# Patient Record
Sex: MALE | Race: WHITE | NOT HISPANIC OR LATINO | Employment: PART TIME | URBAN - METROPOLITAN AREA
[De-identification: names, ages, dates, MRNs, and addresses within clinical notes are randomized per-mention and may not be internally consistent; named-entity substitution may affect disease eponyms.]

---

## 2017-01-04 DIAGNOSIS — M79.641 RIGHT HAND PAIN: Primary | ICD-10-CM

## 2017-01-05 ENCOUNTER — HOSPITAL ENCOUNTER (OUTPATIENT)
Dept: RADIOLOGY | Facility: HOSPITAL | Age: 67
Discharge: HOME | End: 2017-01-05
Attending: ORTHOPAEDIC SURGERY
Payer: MEDICARE

## 2017-01-05 ENCOUNTER — OFFICE VISIT (OUTPATIENT)
Dept: ORTHOPEDICS | Facility: CLINIC | Age: 67
End: 2017-01-05
Payer: MEDICARE

## 2017-01-05 ENCOUNTER — TELEPHONE (OUTPATIENT)
Dept: PHYSICAL MEDICINE AND REHAB | Facility: CLINIC | Age: 67
End: 2017-01-05

## 2017-01-05 VITALS
HEART RATE: 72 BPM | WEIGHT: 170 LBS | SYSTOLIC BLOOD PRESSURE: 136 MMHG | BODY MASS INDEX: 27.32 KG/M2 | HEIGHT: 66 IN | DIASTOLIC BLOOD PRESSURE: 71 MMHG

## 2017-01-05 DIAGNOSIS — M79.601 RIGHT ARM PAIN: Primary | ICD-10-CM

## 2017-01-05 DIAGNOSIS — R20.0 NUMBNESS OF RIGHT HAND: ICD-10-CM

## 2017-01-05 DIAGNOSIS — M79.641 RIGHT HAND PAIN: ICD-10-CM

## 2017-01-05 PROCEDURE — 73130 X-RAY EXAM OF HAND: CPT | Mod: 26,RT,, | Performed by: RADIOLOGY

## 2017-01-05 PROCEDURE — 1126F AMNT PAIN NOTED NONE PRSNT: CPT | Mod: S$GLB,,, | Performed by: ORTHOPAEDIC SURGERY

## 2017-01-05 PROCEDURE — 1159F MED LIST DOCD IN RCRD: CPT | Mod: S$GLB,,, | Performed by: ORTHOPAEDIC SURGERY

## 2017-01-05 PROCEDURE — 73130 X-RAY EXAM OF HAND: CPT | Mod: TC,PN,RT

## 2017-01-05 PROCEDURE — 1160F RVW MEDS BY RX/DR IN RCRD: CPT | Mod: S$GLB,,, | Performed by: ORTHOPAEDIC SURGERY

## 2017-01-05 PROCEDURE — 99214 OFFICE O/P EST MOD 30 MIN: CPT | Mod: S$GLB,,, | Performed by: ORTHOPAEDIC SURGERY

## 2017-01-05 PROCEDURE — 1157F ADVNC CARE PLAN IN RCRD: CPT | Mod: S$GLB,,, | Performed by: ORTHOPAEDIC SURGERY

## 2017-01-05 PROCEDURE — 3078F DIAST BP <80 MM HG: CPT | Mod: S$GLB,,, | Performed by: ORTHOPAEDIC SURGERY

## 2017-01-05 PROCEDURE — 3075F SYST BP GE 130 - 139MM HG: CPT | Mod: S$GLB,,, | Performed by: ORTHOPAEDIC SURGERY

## 2017-01-05 PROCEDURE — 99999 PR PBB SHADOW E&M-EST. PATIENT-LVL III: CPT | Mod: PBBFAC,,, | Performed by: ORTHOPAEDIC SURGERY

## 2017-01-05 NOTE — TELEPHONE ENCOUNTER
----- Message from Naila Fuller LPN sent at 1/5/2017  2:51 PM CST -----  Good Afternoon,   Please call patient to schedule a follow up appt with Dr. Liang for review of his MRI of cervical spine. Mri is scheduled for 1/11/17. Dr. Orlando would like patient to be seen as soon as possible after MRI. Thanks.
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
## 2017-01-11 ENCOUNTER — HOSPITAL ENCOUNTER (OUTPATIENT)
Dept: RADIOLOGY | Facility: HOSPITAL | Age: 67
Discharge: HOME | End: 2017-01-11
Attending: ORTHOPAEDIC SURGERY
Payer: MEDICARE

## 2017-01-11 DIAGNOSIS — M79.601 RIGHT ARM PAIN: ICD-10-CM

## 2017-01-11 PROCEDURE — 72141 MRI NECK SPINE W/O DYE: CPT | Mod: TC

## 2017-01-11 PROCEDURE — 72141 MRI NECK SPINE W/O DYE: CPT | Mod: 26,,, | Performed by: RADIOLOGY

## 2017-01-18 ENCOUNTER — OFFICE VISIT (OUTPATIENT)
Dept: PHYSICAL MEDICINE AND REHAB | Facility: CLINIC | Age: 67
End: 2017-01-18
Payer: MEDICARE

## 2017-01-18 ENCOUNTER — OFFICE VISIT (OUTPATIENT)
Dept: PAIN MEDICINE | Facility: CLINIC | Age: 67
End: 2017-01-18
Payer: MEDICARE

## 2017-01-18 VITALS
BODY MASS INDEX: 27.14 KG/M2 | HEIGHT: 66 IN | HEART RATE: 57 BPM | WEIGHT: 168.88 LBS | SYSTOLIC BLOOD PRESSURE: 177 MMHG | DIASTOLIC BLOOD PRESSURE: 81 MMHG

## 2017-01-18 VITALS
SYSTOLIC BLOOD PRESSURE: 174 MMHG | HEART RATE: 59 BPM | BODY MASS INDEX: 27 KG/M2 | HEIGHT: 66 IN | WEIGHT: 168 LBS | DIASTOLIC BLOOD PRESSURE: 81 MMHG

## 2017-01-18 DIAGNOSIS — M50.30 DDD (DEGENERATIVE DISC DISEASE), CERVICAL: Primary | ICD-10-CM

## 2017-01-18 DIAGNOSIS — M54.12 CERVICAL RADICULAR PAIN: Primary | ICD-10-CM

## 2017-01-18 DIAGNOSIS — R29.898 RIGHT HAND WEAKNESS: ICD-10-CM

## 2017-01-18 PROCEDURE — 1157F ADVNC CARE PLAN IN RCRD: CPT | Mod: S$GLB,,, | Performed by: PHYSICAL MEDICINE & REHABILITATION

## 2017-01-18 PROCEDURE — 99999 PR PBB SHADOW E&M-EST. PATIENT-LVL III: CPT | Mod: PBBFAC,,, | Performed by: PHYSICAL MEDICINE & REHABILITATION

## 2017-01-18 PROCEDURE — 3079F DIAST BP 80-89 MM HG: CPT | Mod: S$GLB,,, | Performed by: PHYSICAL MEDICINE & REHABILITATION

## 2017-01-18 PROCEDURE — 3077F SYST BP >= 140 MM HG: CPT | Mod: S$GLB,,, | Performed by: PHYSICAL MEDICINE & REHABILITATION

## 2017-01-18 PROCEDURE — 1160F RVW MEDS BY RX/DR IN RCRD: CPT | Mod: S$GLB,,, | Performed by: PHYSICAL MEDICINE & REHABILITATION

## 2017-01-18 PROCEDURE — 3079F DIAST BP 80-89 MM HG: CPT | Mod: S$GLB,,, | Performed by: ANESTHESIOLOGY

## 2017-01-18 PROCEDURE — 1125F AMNT PAIN NOTED PAIN PRSNT: CPT | Mod: S$GLB,,, | Performed by: ANESTHESIOLOGY

## 2017-01-18 PROCEDURE — 1159F MED LIST DOCD IN RCRD: CPT | Mod: S$GLB,,, | Performed by: ANESTHESIOLOGY

## 2017-01-18 PROCEDURE — 99204 OFFICE O/P NEW MOD 45 MIN: CPT | Mod: S$GLB,,, | Performed by: ANESTHESIOLOGY

## 2017-01-18 PROCEDURE — 3077F SYST BP >= 140 MM HG: CPT | Mod: S$GLB,,, | Performed by: ANESTHESIOLOGY

## 2017-01-18 PROCEDURE — 99999 PR PBB SHADOW E&M-EST. PATIENT-LVL III: CPT | Mod: PBBFAC,,, | Performed by: ANESTHESIOLOGY

## 2017-01-18 PROCEDURE — 99214 OFFICE O/P EST MOD 30 MIN: CPT | Mod: S$GLB,,, | Performed by: PHYSICAL MEDICINE & REHABILITATION

## 2017-01-18 PROCEDURE — 1160F RVW MEDS BY RX/DR IN RCRD: CPT | Mod: S$GLB,,, | Performed by: ANESTHESIOLOGY

## 2017-01-18 PROCEDURE — 1157F ADVNC CARE PLAN IN RCRD: CPT | Mod: S$GLB,,, | Performed by: ANESTHESIOLOGY

## 2017-01-18 PROCEDURE — 1159F MED LIST DOCD IN RCRD: CPT | Mod: S$GLB,,, | Performed by: PHYSICAL MEDICINE & REHABILITATION

## 2017-01-18 PROCEDURE — 1125F AMNT PAIN NOTED PAIN PRSNT: CPT | Mod: S$GLB,,, | Performed by: PHYSICAL MEDICINE & REHABILITATION

## 2017-01-18 NOTE — PROGRESS NOTES
HPI:  Patient is a 66 y.o. year old male s/p right carpal tunnel release 5 mos ago. However, he has pain in his entire arm and his digits 4 and 5 are triggering.  ordered mri of cervical spine see below. Occasionally he has straighten out digit 5 and 4.   IMAGING  MRI CERVICAL SPINE WITHOUT CONTRAST    COMPARISON:  None    TECHNIQUE:  Sagittal T1, T2, and STIR;  axial T2 and 3D GRE sequences through the cervical spine were acquired without contrast.      FINDINGS:    There is a mild bursal of the usual cervical lordosis in the mid cervical spine and there is very slight posterior positioning of C5 relative to C6 by 1-2 mm most likely on a degenerative basis.  There is no abnormal marrow signal suggesting acute fracture or osseous destruction    C2-C3: No disc protrusion or spinal canal or neural foramen narrowing    C3-C4: Uncovertebral spurring, mild broad posterior disc bulge, just mild impression on the thecal sac, moderate bilateral neural foramen narrowing    C4-C5: Posterior midline disc protrusion with near complete if not complete effacement of the thecal sac. Mild uncovertebral spurring.  Mild bilateral neural foramen narrowing    C5-C6: Uncovertebral spurring, mild retrolisthesis, broad posterior disc bulge, moderate bilateral neural foramen narrowing, mild impression of the thecal sac    C6-C7: Facet arthropathy, uncovertebral spurring, posterior left paracentral disc protrusion in contact with the anterior margin of the spinal cord without appreciable deformity of the spinal cord or abnormal signal in the spinal cord.  Moderate severe left, moderate right neural foramen narrowing    C7-T1: No spinal canal or neural foramen narrowing    The cervical spinal cord is intrinsically normal in appearance and craniocervical junction is normal in appearance   Impression       C6-C7 there is mild spinal canal narrowing with facet arthropathy, posterior osteophytes and disc protrusion with the posterior  margin of the disc in contact with the anterior margin of the spinal cord.    To a lesser degree there is mild spinal canal narrowing C4-C5, C3-C4, C5-C6.  Multilevel neural foramen narrowing as described               3 views of the right hand are provided.  An acute fracture is not identified.  There is narrowing of the radiocarpal joint and slight irregularity at the first carpal metacarpal joint consistent with DJD.  There also degenerative changes noted at the DIP joint of the index finger with lesser degenerative changes noted of the fifth finger PIP joint and DIP joint and the middle finger DIP joint.  Juxta-articular bone erosion is not seen.   Impression    DJD changes at the wrist and fingers as described           Past Medical History   Diagnosis Date    Coronary artery disease 2002     stent    Hypertension     Kidney stones     MI (myocardial infarction)      Past Surgical History   Procedure Laterality Date    Coronary stent placement      Tonsillectomy      Cardiac surgery      Lithotripsy       Family History   Problem Relation Age of Onset    Diabetes Mother     Stroke Father     Cystic fibrosis Son      Social History     Social History    Marital status:      Spouse name: N/A    Number of children: N/A    Years of education: N/A     Social History Main Topics    Smoking status: Former Smoker     Packs/day: 1.00     Years: 3.00     Start date: 1/1/1967    Smokeless tobacco: Never Used    Alcohol use No      Comment: OCC    Drug use: No    Sexual activity: Not on file     Other Topics Concern    Not on file     Social History Narrative       Review of patient's allergies indicates:  No Known Allergies    Current Outpatient Prescriptions:     amlodipine (NORVASC) 10 MG tablet, Take 1 tablet (10 mg total) by mouth once daily. (Patient taking differently: Take 10 mg by mouth once daily. PRN), Disp: 30 tablet, Rfl: 1    hydrocodone-acetaminophen 5-325mg (NORCO) 5-325 mg per  tablet, Take 1 tablet by mouth every 4 (four) hours as needed for Pain., Disp: 41 tablet, Rfl: 0    hydrocodone-acetaminophen 5-325mg (NORCO) 5-325 mg per tablet, Take 1 tablet by mouth every 4 (four) hours as needed for Pain., Disp: 41 tablet, Rfl: 0    ondansetron (ZOFRAN-ODT) 4 MG TbDL, Take 2 tablets (8 mg total) by mouth every 8 (eight) hours as needed., Disp: 20 tablet, Rfl: 0          Review of Systems  No nausea, vomiting, fevers, Chills , contipation, diarrhea or sweats    Physical Exam:      Vitals:    01/18/17 1054   BP: (!) 177/81   Pulse: (!) 57     alert and oriented ×4 follows commands answers all questions appropriately,affect wnl  Manual muscle test 5 out of 5 except for hi left side 4/5 sensation to light touch grossly intact in left arm dec in right arm entire arm  +excruciate tenderness b/l  cervical paraspinals   Antalgic gait  No C/C/E  Painful nodules at base of left digits 4 and 5  Assessment:  Cervical radic w. Mod  neuroforaminal narrowing left side and severe on right side  S/p carpal tunnel surgery w. Improvement   Trigger fingers left digi 4 and 5  Right ulnar neuropathy at elbow in prior emg mild    Plan:  Referral to dr. Johnston pain management for cervical tammy- therapeutic/diagnostic  Repeat the EMG only on right arm  Refer to physical for strengthening of right arm  Refer to nsg, however will wait on emg for any surgery,  I think the weakness in the right hand is secondary to an ulnar nerve entrapment at elbow rather than  Secondary to a true cervical radic despite the mri findings. The tammy of cervical spine should also help w. Diagnosis.   Consider referral to dr. Thurston for ulnar nerve release pending response and results from above

## 2017-01-18 NOTE — PROGRESS NOTES
This note was completed with dictation software and grammatical errors may exist.    Referring Physician: Yoly Liang,*    PCP: Jarett Davenport Iv, MD      CC: right hand weakness    HPI:   Patient is 66-year-old male referred to us for right hand weakness.  Pain has progressed over last 6 months.  No traumatic incident.  His constant aching, numbness in his right ring finger and pinky.  He feels radiating pain up his wrist into his shoulder at times.  Pain worsens with lifting of his right arm and lifting.  He does have some mild posterior neck pain.  He denies any radiating pain from his neck to his hands.  He reports weakness in those fingers.  No incoordination.  No bowel bladder changes.  He is scheduled for EMG study with Dr. Liang in the near future.  MRI of the cervical spine did show some degenerative changes in his neck.  He rates his pain 6/10 today.    ROS:  CONSTITUTIONAL: No fevers, chills, night sweats, wt. loss, appetite changes  SKIN: no rashes or itching  ENT: No headaches, head trauma, vision changes, or eye pain  LYMPH NODES: None noticed   CV: No chest pain, palpitations.   RESP: No shortness of breath, dyspnea on exertion, cough, wheezing, or hemoptysis  GI: No nausea, emesis, diarrhea, constipation, melena, hematochezia, pain.    : No dysuria, hematuria, urgency, or frequency   HEME: No easy bruising, bleeding problems  PSYCHIATRIC: No depression, anxiety, psychosis, hallucinations.  NEURO: No seizures, memory loss, dizziness or difficulty sleeping  MSK: + History of present illness      Past Medical History   Diagnosis Date    Coronary artery disease 2002     stent    Hypertension     Kidney stones     MI (myocardial infarction)      Past Surgical History   Procedure Laterality Date    Coronary stent placement      Tonsillectomy      Cardiac surgery      Lithotripsy       Family History   Problem Relation Age of Onset    Diabetes Mother     Stroke Father     Cystic  "fibrosis Son      Social History     Social History    Marital status:      Spouse name: N/A    Number of children: N/A    Years of education: N/A     Social History Main Topics    Smoking status: Former Smoker     Packs/day: 1.00     Years: 3.00     Start date: 1/1/1967    Smokeless tobacco: Never Used    Alcohol use No      Comment: OCC    Drug use: No    Sexual activity: Not Asked     Other Topics Concern    None     Social History Narrative         Medications/Allergies: See med card    Vitals:    01/18/17 1449   BP: (!) 174/81   Pulse: (!) 59   Weight: 76.2 kg (168 lb)   Height: 5' 6" (1.676 m)   PainSc:   6   PainLoc: Hand         Physical exam:    GENERAL: A and O x3, the patient appears well groomed and is in no acute distress.  Skin: No rashes or obvious lesions  HEENT: normocephalic, atraumatic  CARDIOVASCULAR:  Palpable peripheral pulses  LUNGS: easy work of breathing  ABDOMEN: soft, nontender   UPPER EXTREMITIES: Normal alignment, normal range of motion, no atrophy, no skin changes,  hair growth and nail growth normal and equal bilaterally. No swelling, no tenderness.    LOWER EXTREMITIES:  Normal alignment, normal range of motion, no atrophy, no skin changes,  hair growth and nail growth normal and equal bilaterally. No swelling, no tenderness.  CERVICAL SPINE:  Cervical spine: ROM is full in flexion, extension and lateral rotation without increased pain.  Spurling's maneuver causes no neck pain to either side.  Myofascial exam: No Tenderness to palpation across cervical paraspinous region bilaterally.      MENTAL STATUS: normal orientation, speech, language, and fund of knowledge for social situation.  Emotional state appropriate.    CRANIAL NERVES:  II:  PERRL bilaterally,   III,IV,VI: EOMI.    V:  Facial sensation equal bilaterally  VII:  Facial motor function normal.  VIII:  Hearing equal to finger rub bilaterally  IX/X: Gag normal, palate symmetric  XI:  Shoulder shrug equal, head " turn equal  XII:  Tongue midline without fasciculations      MOTOR: Tone and bulk: normal bilateral upper and lower Strength: normal   Delt Bi Tri WE WF     R 5 5 5 5 5 4   L 5 5 5 5 5 5     IP ADD ABD Quad TA Gas HAM  R 5 5 5 5 5 5 5  L 5 5 5 5 5 5 5    SENSATION: Light touch and pinprick intact bilaterally  REFLEXES: normal, symmetric, nonbrisk.  Toes down, no clonus. No hoffmans.  GAIT: normal rise, base, steps, and arm swing.        Imaging:  MRI C-spine 12/2016  C6-C7 there is mild spinal canal narrowing with facet arthropathy, posterior osteophytes and disc protrusion with the posterior margin of the disc in contact with the anterior margin of the spinal cord.    To a lesser degree there is mild spinal canal narrowing C4-C5, C3-C4, C5-C6.  Multilevel neural foramen narrowing as described    Assessment:  Patient presents with right hand weakness  1. DDD (degenerative disc disease), cervical    2. Right hand weakness          Plan:  - I have stressed the importance of physical activity and exercise to improve overall health  - Continue evaluation with Dr. Valente.  I agree with EMG study as I suspect some degree of ulnar neuropathy  - He does have findings on his cervical MRI.  Agree with scheduling a cervical GABRIELE as both a diagnostic and therapeutic approach to help with his pain  - Follow up after procedure      Thank you for referring this interesting patient, and I look forward to continuing to collaborate in his care.

## 2017-01-18 NOTE — MR AVS SNAPSHOT
[facility]  [address] [place] [address] [place] [address]  Phone: [phone]  Fax: [phone]                  Neptali Gonzalez   2017 11:00 AM   Office Visit    Description:  Male : 1950   Provider:  Yoly Liang DO   Department:  [facility]           Reason for Visit     Shoulder Pain           Diagnoses this Visit        Comments    Cervical radicular pain    -  Primary            To Do List           Future Appointments        Provider Department Dept Phone    2017 2:20 PM Gordon Johnston MD [place] [phone]    2017 1:00 PM Yoly Liang DO [facility] [phone]    2017 1:40 PM Yoly Liang DO [facility] [phone]      Goals (5 Years of Data)     None      Follow-Up and Disposition     Return in about 4 weeks (around 2/15/2017).    Follow-up and Disposition History      [facility] On Call     [facility] On Call Nurse Care Line -  Assistance  Registered nurses in the [facility] On Call Center provide clinical advisement, health education, appointment booking, and other advisory services.  Call for this free service at [phone].             Medications           Message regarding Medications     Verify the changes and/or additions to your medication regime listed below are the same as discussed with your clinician today.  If any of these changes or additions are incorrect, please notify your healthcare provider.             Verify that the below list of medications is an accurate representation of the medications you are currently taking.  If none reported, the list may be blank. If incorrect, please contact your healthcare provider. Carry this list with you in case of emergency.           Current Medications     amlodipine (NORVASC) 10 MG tablet Take 1 tablet (10 mg total) by mouth once daily.    hydrocodone-acetaminophen 5-325mg (NORCO)  "5-325 mg per tablet Take 1 tablet by mouth every 4 (four) hours as needed for Pain.    hydrocodone-acetaminophen 5-325mg (NORCO) 5-325 mg per tablet Take 1 tablet by mouth every 4 (four) hours as needed for Pain.    ondansetron (ZOFRAN-ODT) 4 MG TbDL Take 2 tablets (8 mg total) by mouth every 8 (eight) hours as needed.           Clinical Reference Information           Vital Signs - Last Recorded  Most recent update: 1/18/2017 10:58 AM by Melony Brown MA    BP Pulse Ht Wt BMI    (!) 177/81 (!) 57 5' 6" (1.676 m) 76.6 kg (168 lb 14 oz) 27.26 kg/m2      Blood Pressure          Most Recent Value    BP  (!)  177/81      Allergies as of 1/18/2017     No Known Allergies      Immunizations Administered on Date of Encounter - 1/18/2017     None      Orders Placed During Today's Visit      Normal Orders This Visit    Ambulatory Referral to Pain Clinic     Ambulatory Referral to Physical/Occupational Therapy     Future Labs/Procedures Expected by Expires    EMG w/ Ultrasound  As directed 1/18/2018      "

## 2017-01-23 DIAGNOSIS — M54.12 CERVICAL RADICULOPATHY: Primary | ICD-10-CM

## 2017-01-30 ENCOUNTER — HOSPITAL ENCOUNTER (OUTPATIENT)
Facility: AMBULARY SURGERY CENTER | Age: 67
Discharge: HOME | End: 2017-01-30
Attending: ANESTHESIOLOGY | Admitting: ANESTHESIOLOGY
Payer: MEDICARE

## 2017-01-30 ENCOUNTER — SURGERY (OUTPATIENT)
Age: 67
End: 2017-01-30

## 2017-01-30 DIAGNOSIS — M50.30 DDD (DEGENERATIVE DISC DISEASE), CERVICAL: Primary | ICD-10-CM

## 2017-01-30 PROCEDURE — 99152 MOD SED SAME PHYS/QHP 5/>YRS: CPT | Mod: ,,, | Performed by: ANESTHESIOLOGY

## 2017-01-30 PROCEDURE — 62321 NJX INTERLAMINAR CRV/THRC: CPT | Mod: ,,, | Performed by: ANESTHESIOLOGY

## 2017-01-30 PROCEDURE — 62321 NJX INTERLAMINAR CRV/THRC: CPT

## 2017-01-30 RX ORDER — SODIUM CHLORIDE 9 MG/ML
INJECTION, SOLUTION INTRAMUSCULAR; INTRAVENOUS; SUBCUTANEOUS
Status: DISCONTINUED | OUTPATIENT
Start: 2017-01-30 | End: 2017-01-30 | Stop reason: HOSPADM

## 2017-01-30 RX ORDER — DEXAMETHASONE SODIUM PHOSPHATE 10 MG/ML
INJECTION INTRAMUSCULAR; INTRAVENOUS
Status: DISCONTINUED
Start: 2017-01-30 | End: 2017-01-30 | Stop reason: HOSPADM

## 2017-01-30 RX ORDER — MIDAZOLAM HYDROCHLORIDE 2 MG/2ML
INJECTION, SOLUTION INTRAMUSCULAR; INTRAVENOUS
Status: DISCONTINUED | OUTPATIENT
Start: 2017-01-30 | End: 2017-01-30 | Stop reason: HOSPADM

## 2017-01-30 RX ORDER — ALPRAZOLAM 1 MG/1
1 TABLET, ORALLY DISINTEGRATING ORAL ONCE AS NEEDED
Status: DISCONTINUED | OUTPATIENT
Start: 2017-01-30 | End: 2017-01-30 | Stop reason: HOSPADM

## 2017-01-30 RX ORDER — DEXAMETHASONE SODIUM PHOSPHATE 10 MG/ML
INJECTION INTRAMUSCULAR; INTRAVENOUS
Status: DISCONTINUED | OUTPATIENT
Start: 2017-01-30 | End: 2017-01-30 | Stop reason: HOSPADM

## 2017-01-30 RX ORDER — FENTANYL CITRATE 50 UG/ML
INJECTION, SOLUTION INTRAMUSCULAR; INTRAVENOUS
Status: DISCONTINUED
Start: 2017-01-30 | End: 2017-01-30 | Stop reason: HOSPADM

## 2017-01-30 RX ORDER — SODIUM CHLORIDE, SODIUM LACTATE, POTASSIUM CHLORIDE, CALCIUM CHLORIDE 600; 310; 30; 20 MG/100ML; MG/100ML; MG/100ML; MG/100ML
INJECTION, SOLUTION INTRAVENOUS CONTINUOUS
Status: DISCONTINUED | OUTPATIENT
Start: 2017-01-30 | End: 2017-01-30 | Stop reason: HOSPADM

## 2017-01-30 RX ORDER — FENTANYL CITRATE 50 UG/ML
INJECTION, SOLUTION INTRAMUSCULAR; INTRAVENOUS
Status: DISCONTINUED | OUTPATIENT
Start: 2017-01-30 | End: 2017-01-30 | Stop reason: HOSPADM

## 2017-01-30 RX ORDER — MIDAZOLAM HYDROCHLORIDE 1 MG/ML
INJECTION INTRAMUSCULAR; INTRAVENOUS
Status: DISCONTINUED
Start: 2017-01-30 | End: 2017-01-30 | Stop reason: HOSPADM

## 2017-01-30 RX ORDER — LIDOCAINE HYDROCHLORIDE 10 MG/ML
INJECTION INFILTRATION; PERINEURAL
Status: DISCONTINUED | OUTPATIENT
Start: 2017-01-30 | End: 2017-01-30 | Stop reason: HOSPADM

## 2017-01-30 RX ADMIN — SODIUM CHLORIDE, SODIUM LACTATE, POTASSIUM CHLORIDE, CALCIUM CHLORIDE: 600; 310; 30; 20 INJECTION, SOLUTION INTRAVENOUS at 01:01

## 2017-01-30 RX ADMIN — SODIUM CHLORIDE 4 ML: 9 INJECTION, SOLUTION INTRAMUSCULAR; INTRAVENOUS; SUBCUTANEOUS at 02:01

## 2017-01-30 RX ADMIN — DEXAMETHASONE SODIUM PHOSPHATE 10 MG: 10 INJECTION INTRAMUSCULAR; INTRAVENOUS at 02:01

## 2017-01-30 RX ADMIN — MIDAZOLAM HYDROCHLORIDE 2 MG: 2 INJECTION, SOLUTION INTRAMUSCULAR; INTRAVENOUS at 02:01

## 2017-01-30 RX ADMIN — FENTANYL CITRATE 75 MCG: 50 INJECTION, SOLUTION INTRAMUSCULAR; INTRAVENOUS at 02:01

## 2017-01-30 RX ADMIN — LIDOCAINE HYDROCHLORIDE 10 ML: 10 INJECTION INFILTRATION; PERINEURAL at 02:01

## 2017-01-30 NOTE — INTERVAL H&P NOTE
The patient has been examined and the H&P has been reviewed:    I concur with the findings and no changes have occurred since H&P was written.   ASA 3    Anesthesia/Surgery risks, benefits and alternative options discussed and understood by patient/family.          Active Hospital Problems    Diagnosis  POA    DDD (degenerative disc disease), cervical [M50.30]  Yes      Resolved Hospital Problems    Diagnosis Date Resolved POA   No resolved problems to display.

## 2017-01-30 NOTE — OP NOTE
PROCEDURE DATE: 1/30/2017    Procedure: C7-T1 cervical interlaminar epidural steroid injection under utilizing fluoroscopy.    Diagnosis: Cervical DISC DEGENERATION WITHOUT MYELOPATHY  POSTOP DIAGNOSIS: SAME    Physician: Gordon Johnston MD    Medications injected:  Dexamethasone 10mg followed by a slow injection of 4 mL sterile, preservative-free normal saline.    Local anesthetic used: Lidocaine 1%, 2 ml.    Sedation Medications: RN IV sedation    Complications:  None    Estimated blood loss: none    Technique:  A time-out was taken to identify patient and procedure prior to starting the procedure.  With the patient laying in a prone position with the neck in a mid-flexed forward position, the area was prepped and draped in the usual sterile fashion using ChloraPrep and a fenestrated drape.  The area was determined under AP fluoroscopic guidance.  Local anesthetic was given using a 25-gauge 1.5 inch needle by raising a wheal and then infiltrating ventrally.  A 3.5 inch 20-gauge Touhy needle was introduced under fluoroscopic guidance to meet the lamina of C7.  The needle was then hinged under the lamina then advanced using loss of resistance technique.  Once the tip of the needle was in the desired position, the 1ml contrast dye Omnipaque was injected to determine placement and no uptake.  The steroid was then injected slowly followed by a slow injection of 4 mL of the sterile preservative-free normal saline.  The patient tolerated the procedure well.    The patient was monitored after the procedure and was given post-procedure and discharge instructions to follow at home. The patient was discharged in a stable condition.

## 2017-01-30 NOTE — DISCHARGE SUMMARY
[facility]  Discharge Note  Short Stay    Admit Date: 1/30/2017    Discharge Date and Time: 1/30/2017    Attending Physician: Gordon Johnston MD     Discharge Provider: Gordon Johnston    Diagnoses:  Active Hospital Problems    Diagnosis  POA    *DDD (degenerative disc disease), cervical [M50.30]  Yes      Resolved Hospital Problems    Diagnosis Date Resolved POA   No resolved problems to display.       Hospital Course: Cervical GABRIELE  Discharged Condition: Good    Final Diagnoses:   Active Hospital Problems    Diagnosis  POA    *DDD (degenerative disc disease), cervical [M50.30]  Yes      Resolved Hospital Problems    Diagnosis Date Resolved POA   No resolved problems to display.       Disposition: Home or Self Care    Follow up/Patient Instructions:    Medications:  Reconciled Home Medications:   Current Discharge Medication List      CONTINUE these medications which have NOT CHANGED    Details   amlodipine (NORVASC) 10 MG tablet Take 1 tablet (10 mg total) by mouth once daily.  Qty: 30 tablet, Refills: 1             Discharge Procedure Orders  Diet general     Activity as tolerated     Call MD for:  temperature >100.4     Call MD for:  persistent nausea and vomiting or diarrhea     Call MD for:  severe uncontrolled pain     Call MD for:  redness, tenderness, or signs of infection (pain, swelling, redness, odor or green/yellow discharge around incision site)     Call MD for:  difficulty breathing or increased cough     Call MD for:  severe persistent headache          Follow up with MD in 2-3 weeks    Discharge Procedure Orders (must include Diet, Follow-up, Activity):    Discharge Procedure Orders (must include Diet, Follow-up, Activity)  Diet general     Activity as tolerated     Call MD for:  temperature >100.4     Call MD for:  persistent nausea and vomiting or diarrhea     Call MD for:  severe uncontrolled pain     Call MD for:  redness, tenderness, or signs of infection (pain, swelling, redness, odor or  green/yellow discharge around incision site)     Call MD for:  difficulty breathing or increased cough     Call MD for:  severe persistent headache

## 2017-01-30 NOTE — DISCHARGE INSTRUCTIONS
Pain injection instructions:       No driving for 24 hrs   Activity as tolerated- gradually increase activities.  Dont lift over 10 lbs for 24 hrs  No heat at injection sites x 2 days  Use ice for mild swelling and for comfort.  May shower today. No baths for two days.      Resume Aspirin, Plavix, or Coumadin the day after the procedure unless otherwise instructed.   If diabetic,monitor your glucose carefully as steroids can increase glucose level    Seek immediate medical help for:   Severe increase in your usual pain or appearance of new pain.  Prolonged or increasing weakness or numbness in the legs or arms.  Fever above 101 ,Drainage,redness,active bleeding, or increased swelling at the injection site.  Headache, shortness of breath, chest pain, or breathing problems.

## 2017-01-30 NOTE — IP AVS SNAPSHOT
[facility] [place] Location  [address] [facility] [address]           Patient Discharge Instructions     Our goal is to set you up for success. This packet includes information on your condition, medications, and your home care. It will help you to care for yourself so you don't get sicker and need to go back to the hospital.     Please ask your nurse if you have any questions.        There are many details to remember when preparing to leave the hospital. Here is what you will need to do:    1. Take your medicine. If you are prescribed medications, review your Medication List in the following pages. You may have new medications to  at the pharmacy and others that you'll need to stop taking. Review the instructions for how and when to take your medications. Talk with your doctor or nurses if you are unsure of what to do.     2. Go to your follow-up appointments. Specific follow-up information is listed in the following pages. Your may be contacted by a transition nurse or clinical provider about future appointments. Be sure we have all of the phone numbers to reach you, if needed. Please contact your provider's office if you are unable to make an appointment.     3. Watch for warning signs. Your doctor or nurse will give you detailed warning signs to watch for and when to call for assistance. These instructions may also include educational information about your condition. If you experience any of warning signs to your health, call your doctor.               [facility]  Unless otherwise directed by your provider, please contact [facility], our nurse care line that is available for 24/7 assistance.     [phone] (toll-free)    Registered nurses in the [facility] provide clinical advisement, health education, appointment booking, and other advisory services.                    ** Verify the list of medication(s) below is accurate and up  to date. Carry this with you in case of emergency. If your medications have changed, please notify your healthcare provider.             Medication List      CHANGE how you take these medications        Additional Info                      amlodipine 10 MG tablet   Commonly known as:  NORVASC   Quantity:  30 tablet   Refills:  1   Dose:  10 mg   What changed:  additional instructions    Instructions:  Take 1 tablet (10 mg total) by mouth once daily.     Begin Date    AM    Noon    PM    Bedtime                  Please bring to all follow up appointments:    1. A copy of your discharge instructions.  2. All medicines you are currently taking in their original bottles.  3. Identification and insurance card.    Please arrive 15 minutes ahead of scheduled appointment time.    Please call 24 hours in advance if you must reschedule your appointment and/or time.        Your Scheduled Appointments     Feb 02, 2017  1:00 PM CST   Established Physical Therapy with Arianna Stephens PT   [facility] ([facility])    [address] [place] [address]   [phone]            Feb 13, 2017  1:00 PM CST   [facility]    [address] [place] [address]   [phone]            Feb 13, 2017  1:40 PM CST   [facility]    [address] [place] [address]   [phone]            Mar 03, 2017  8:30 AM CST   Established Patient Visit with KATIE Joy - Pain Management ([facility])    [address] [place] [address] Dr [address]  [facility] [address]   [phone]                Discharge Instructions     Future Orders    Activity as tolerated     Call MD for:  difficulty breathing or increased cough     Call MD for:  persistent nausea and vomiting or  "diarrhea     Call MD for:  redness, tenderness, or signs of infection (pain, swelling, redness, odor or green/yellow discharge around incision site)     Call MD for:  severe persistent headache     Call MD for:  severe uncontrolled pain     Call MD for:  temperature >100.4     Diet general     Questions:    Total calories:      Fat restriction, if any:      Protein restriction, if any:      Na restriction, if any:      Fluid restriction:      Additional restrictions:          Discharge Instructions       Pain injection instructions:       No driving for 24 hrs   Activity as tolerated- gradually increase activities.  Dont lift over 10 lbs for 24 hrs  No heat at injection sites x 2 days  Use ice for mild swelling and for comfort.  May shower today. No baths for two days.      Resume Aspirin, Plavix, or Coumadin the day after the procedure unless otherwise instructed.   If diabetic,monitor your glucose carefully as steroids can increase glucose level    Seek immediate medical help for:   Severe increase in your usual pain or appearance of new pain.  Prolonged or increasing weakness or numbness in the legs or arms.  Fever above 101 ,Drainage,redness,active bleeding, or increased swelling at the injection site.  Headache, shortness of breath, chest pain, or breathing problems.            Primary Diagnosis     Your primary diagnosis was:  Degeneration Of Intervertebral Disc Of Cervical Region      Admission Information     Date & Time Provider Department CSN    1/30/2017  1:06 PM Gordon Johnston MD [facility] [id number]      Care Providers     Provider Role Specialty Primary office phone    Gordon Johnston MD Attending Provider Pain Medicine [phone]    Gordon Johnston MD Surgeon  Pain Medicine [phone]      Your Vitals Were     BP Pulse Temp Resp Height Weight    133/63 65 97.7 °F (36.5 °C) (Oral) 16 5' 6" (1.676 m) 76.2 kg (168 lb)    SpO2 BMI             90% 27.12 kg/m2         Recent Lab Values     No " lab values to display.      Allergies as of 1/30/2017     No Known Allergies      Smoking Cessation     If you would like to quit smoking:   You may be eligible for free services if you are a [place] resident and started smoking cigarettes before September 1, 1988.  Call the Smoking Cessation [employer] (SCT) toll free at (903) 090-9728 or (552) 465-2598.   Call 7-851-QUIT-NOW if you do not meet the above criteria.            Language Assistance Services     ATTENTION: Language assistance services are available, free of charge. Please call [phone].      ATENCIÓN: Si habla español, tiene a vegas disposición servicios gratuitos de asistencia lingüística. Llame al [phone].     CHÚ Ý: N?u b?n nói Ti?ng Vi?t, có các d?ch v? h? tr? ngôn ng? mi?n phí dành cho b?n. G?i s? [phone].         [facility] complies with applicable Federal civil rights laws and does not discriminate on the basis of race, color, national origin, age, disability, or sex.

## 2017-02-01 VITALS
DIASTOLIC BLOOD PRESSURE: 61 MMHG | WEIGHT: 168 LBS | HEART RATE: 64 BPM | OXYGEN SATURATION: 92 % | RESPIRATION RATE: 16 BRPM | HEIGHT: 66 IN | SYSTOLIC BLOOD PRESSURE: 122 MMHG | BODY MASS INDEX: 27 KG/M2 | TEMPERATURE: 98 F

## 2017-02-02 ENCOUNTER — CLINICAL SUPPORT (OUTPATIENT)
Dept: REHABILITATION | Facility: HOSPITAL | Age: 67
End: 2017-02-02
Attending: PHYSICAL MEDICINE & REHABILITATION
Payer: MEDICARE

## 2017-02-02 DIAGNOSIS — M54.12 CERVICAL RADICULAR PAIN: Primary | ICD-10-CM

## 2017-02-02 PROCEDURE — 97140 MANUAL THERAPY 1/> REGIONS: CPT | Mod: PN | Performed by: PHYSICAL THERAPIST

## 2017-02-02 PROCEDURE — G8979 MOBILITY GOAL STATUS: HCPCS | Mod: CH,PN | Performed by: PHYSICAL THERAPIST

## 2017-02-02 PROCEDURE — 97162 PT EVAL MOD COMPLEX 30 MIN: CPT | Mod: PN | Performed by: PHYSICAL THERAPIST

## 2017-02-02 PROCEDURE — G8978 MOBILITY CURRENT STATUS: HCPCS | Mod: CI,PN | Performed by: PHYSICAL THERAPIST

## 2017-02-02 PROCEDURE — 97530 THERAPEUTIC ACTIVITIES: CPT | Mod: PN,59 | Performed by: PHYSICAL THERAPIST

## 2017-02-02 NOTE — PLAN OF CARE
TIME RECORD    Date: 02/02/2017    Start Time:  100  Stop Time:  200    PROCEDURES:    TIMED  Procedure Time Min.    Start:  Stop:     Start:  Stop:     Start:  Stop:     Start:  Stop:          UNTIMED  Procedure Time Min.    Start:  Stop:     Start:  Stop:      Total Timed Minutes:  23  Total Timed Units:  0  Total Untimed Units:  1  Charges Billed/# of units:  3    OUTPATIENT PHYSICAL THERAPY   PATIENT EVALUATION  Onset Date: CTS x 1 year (surgery 5 mo ago)  Primary Diagnosis:   1. Cervical radicular pain       Treatment Diagnosis: cervical radiculopathy  Past Medical History   Diagnosis Date    Coronary artery disease 2002     stent    Hypertension     Kidney stones     MI (myocardial infarction)      Precautions: MI  Prior Therapy: no  Medications: Neptali Gonzalez has a current medication list which includes the following prescription(s): amlodipine.  Nutrition:  Normal  History of Present Illness: Pt began experiencing pain and dysfunction in the R hand approx one year ago. Found to have CTS with nerve conduction study, release was done 5 months ago. Pt still has minimal use of the fourth and fifth digits of R hand. Pt is R hand dominant. Pt also has pain in the elbow and shoulder with decreased mobility of the right shoulder.   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 Takes care of [facility] pony.   Functional Deficits Leading to Referral/Nature of Injury: unable to use the right hand for playing music/work, lifting with the RUE, restrictions for lifting more than 10#  Patient Therapy Goals: be able to play guitar, get back to normal work    Subjective     Neptali Gonzalez states has to turn down several jobs because he can't lift anything.    Pain:  Location: elbow , hands ,  shoulder  and wrists (right)  Description: Sharp  Activities Which Increase Pain: Random, sometimes with lifting  Activities Which Decrease Pain: rest  Pain Scale: 6/10 at best 6/10 now  10/10 at worst    Objective     Posture: FHP, rounded shoulders.   Palpation: Pt has several soft nodules in BUEs, there are at least 2 near the Ulnar Nerve in the RUE  Sensation: Reports tingling in dermatomal distribution of C6, C7  Range of Motion/Strength:   Cervical AROM: Pain/Dysfunction with Movement:   Flexion 25*    Extension 40*    Right side bending 32*    Left side bending 25* Popping   Right rotation 55*    Left rotation 55*       Shoulder RIGHT  LEFT  Pain/Dysfunction with Movement    AROM MMT AROM MMT    flexion 80* 3+/5 155* 4+/5    abduction 90* 3+/5 145* 4+/5    Internal rotation L3 4/5 T6 4+/5    ER at 0° abd 35* 4/5 65* 4/5    R shoulder pain with flex, abd and IR     Strength via Dynamometry:  R: 32, 30, 29#  L: 74, 72, 65#    Special Tests: Neg compression, Neg distraction  Other: NDI:6/50, 12% disability (Gcodes: Current CI, Goal CH)  Treatment: Manual cervical distraction 10 min, instruction in application and use of home TENS unit.    Assessment       Initial Assessment (Pertinent finding, problem list and factors affecting outcome): Pt is a 67 y/o male with c/o RUE pain from the shoulder to the hand with parasthesia and decreased motor control. Pt was diagnosed with cervical radicular pain, however he has no pain in the neck to speak of today. Pt demonstrates decreased, painful AROM of the R shoulder, decreased strength of the R shoulder flex, abd, elbow ext, wrist flexion and ext and in  strength with R hand. Pt underwent carpal tunnel release to improve the right wrist and hand mobility, however the pt constinues to demonstrate lack of control of the fourth and fifth digits and  strength. Pt will benefit from PT to address these impairment and attempt to reduce compression of the cervical  nerve roots in an attempt to improve motor control and sensation in the RUE. Pt meets criteria for moderate complexity based on several comorbidities, examination of greater than 3 body structures/functions, activity and participation limitation, evolving clinical presentation and moderate complexity of clinical decision making.     Rehab Potiential: good    Short Term Goals (3 Weeks):   1. Pt will report no symptoms distal to the elbow.  2. Pt will demonstrate 5 pound improvement in R hand  strength.  Long Term Goals (6 Weeks):   1. Pt will be able to lift 40 pounds without pain in the RUE.  2. Pt will no pain in RUE >3/10.  3. Pt will report no symptoms distal to the R shoulder.    Plan     Certification Period: 2/2/17 to 3/16/17  Recommended Treatment Plan: 2 times per week for 6 weeks: Cervical/Lumbar Traction, Electrical Stimulation IFC for pain control, Group Therapy, Manual Therapy, Moist Heat/ Ice, Neuromuscular Re-ed, Patient Education, Therapeutic Activites, Therapeutic Exercise and Ultrasound/Phonophoresis  Other Recommendations: NA      Therapist: Arianna Stephens, PT    I CERTIFY THE NEED FOR THESE SERVICES FURNISHED UNDER THIS PLAN OF TREATMENT AND WHILE UNDER MY CARE    Physician's comments: ________________________________________________________________________________________________________________________________________________      Physician's Name: ___________________________________

## 2017-02-09 ENCOUNTER — CLINICAL SUPPORT (OUTPATIENT)
Dept: REHABILITATION | Facility: HOSPITAL | Age: 67
End: 2017-02-09
Attending: PHYSICAL MEDICINE & REHABILITATION
Payer: MEDICARE

## 2017-02-09 ENCOUNTER — TELEPHONE (OUTPATIENT)
Dept: PHYSICAL MEDICINE AND REHAB | Facility: CLINIC | Age: 67
End: 2017-02-09

## 2017-02-09 DIAGNOSIS — M54.12 CERVICAL RADICULAR PAIN: ICD-10-CM

## 2017-02-09 PROCEDURE — 97110 THERAPEUTIC EXERCISES: CPT | Mod: PN

## 2017-02-09 PROCEDURE — 97012 MECHANICAL TRACTION THERAPY: CPT | Mod: PN

## 2017-02-09 NOTE — PROGRESS NOTES
Name: Neptali Burleson [facility] Number: [id number]  Date of Treatment: 02/09/2017   Diagnosis:   Encounter Diagnosis   Name Primary?    Cervical radicular pain        Time in: 0947  Time Out: 1043  Total Treatment Time: 56      Subjective:    Neptali reports no changes.  Patient reports their pain to be 6/10 on a 0-10 scale with 0 being no pain and 10 being the worst pain imaginable.    Objective  Neptali received therapeutic exercises to develop strength, endurance, ROM, flexibility and posture for 41 minutes including:   UBE 5'fwd/5'bwd  Scap squeeze 3/10  Post shld roll 3/10  Shld shrug 3/10  Chin tuck 3/10  Beige putty x 3' R handed     Mech traction 15' 40sec pull, 10sec rest, 18#    Written Home Exercises Provided: Will distribute after assessing tolerance  Pt demo fair understanding of the education provided. Neptali demonstrated fair return demonstration of activities.     Assessment:   Patient experienced numbness and tingling in R hand with UBE and postural TE.    Pt will continue to benefit from skilled PT intervention. Medical Necessity is demonstrated by:  Unable to participate fully in daily activities, Requires skilled supervision to complete and progress HEP and Weakness.    Patient is making good progress towards established goals.    Plan:  Continue with established Plan of Care towards PT goals.

## 2017-02-13 ENCOUNTER — OFFICE VISIT (OUTPATIENT)
Dept: PHYSICAL MEDICINE AND REHAB | Facility: CLINIC | Age: 67
End: 2017-02-13
Payer: MEDICARE

## 2017-02-13 ENCOUNTER — HOSPITAL ENCOUNTER (OUTPATIENT)
Dept: RADIOLOGY | Facility: HOSPITAL | Age: 67
Discharge: HOME | End: 2017-02-13
Attending: PHYSICAL MEDICINE & REHABILITATION
Payer: MEDICARE

## 2017-02-13 ENCOUNTER — PROCEDURE VISIT (OUTPATIENT)
Dept: PHYSICAL MEDICINE AND REHAB | Facility: CLINIC | Age: 67
End: 2017-02-13
Payer: MEDICARE

## 2017-02-13 VITALS
WEIGHT: 169.44 LBS | HEART RATE: 76 BPM | DIASTOLIC BLOOD PRESSURE: 80 MMHG | BODY MASS INDEX: 27.23 KG/M2 | HEIGHT: 66 IN | SYSTOLIC BLOOD PRESSURE: 156 MMHG

## 2017-02-13 DIAGNOSIS — M54.12 CERVICAL RADICULAR PAIN: ICD-10-CM

## 2017-02-13 DIAGNOSIS — M25.511 CHRONIC RIGHT SHOULDER PAIN: Primary | ICD-10-CM

## 2017-02-13 DIAGNOSIS — G89.29 CHRONIC RIGHT SHOULDER PAIN: ICD-10-CM

## 2017-02-13 DIAGNOSIS — G89.29 CHRONIC RIGHT SHOULDER PAIN: Primary | ICD-10-CM

## 2017-02-13 DIAGNOSIS — M25.511 CHRONIC RIGHT SHOULDER PAIN: ICD-10-CM

## 2017-02-13 PROCEDURE — 95886 MUSC TEST DONE W/N TEST COMP: CPT | Mod: S$GLB,,, | Performed by: PHYSICAL MEDICINE & REHABILITATION

## 2017-02-13 PROCEDURE — 73030 X-RAY EXAM OF SHOULDER: CPT | Mod: TC,RT

## 2017-02-13 PROCEDURE — 1159F MED LIST DOCD IN RCRD: CPT | Mod: S$GLB,,, | Performed by: PHYSICAL MEDICINE & REHABILITATION

## 2017-02-13 PROCEDURE — 1160F RVW MEDS BY RX/DR IN RCRD: CPT | Mod: S$GLB,,, | Performed by: PHYSICAL MEDICINE & REHABILITATION

## 2017-02-13 PROCEDURE — 99999 PR PBB SHADOW E&M-EST. PATIENT-LVL II: CPT | Mod: PBBFAC,,, | Performed by: PHYSICAL MEDICINE & REHABILITATION

## 2017-02-13 PROCEDURE — 95908 NRV CNDJ TST 3-4 STUDIES: CPT | Mod: S$GLB,,, | Performed by: PHYSICAL MEDICINE & REHABILITATION

## 2017-02-13 PROCEDURE — 1157F ADVNC CARE PLAN IN RCRD: CPT | Mod: S$GLB,,, | Performed by: PHYSICAL MEDICINE & REHABILITATION

## 2017-02-13 PROCEDURE — 73030 X-RAY EXAM OF SHOULDER: CPT | Mod: 26,RT,, | Performed by: RADIOLOGY

## 2017-02-13 PROCEDURE — 3077F SYST BP >= 140 MM HG: CPT | Mod: S$GLB,,, | Performed by: PHYSICAL MEDICINE & REHABILITATION

## 2017-02-13 PROCEDURE — 3079F DIAST BP 80-89 MM HG: CPT | Mod: S$GLB,,, | Performed by: PHYSICAL MEDICINE & REHABILITATION

## 2017-02-13 PROCEDURE — 1125F AMNT PAIN NOTED PAIN PRSNT: CPT | Mod: S$GLB,,, | Performed by: PHYSICAL MEDICINE & REHABILITATION

## 2017-02-13 PROCEDURE — 99214 OFFICE O/P EST MOD 30 MIN: CPT | Mod: S$GLB,,, | Performed by: PHYSICAL MEDICINE & REHABILITATION

## 2017-02-13 NOTE — PROCEDURES
Procedures     [facility]  Yoly Liang D.O  Physical Medicine and Rehab  Phone: [phone]   Fax: [phone]        Full Name: Neptali Gonzalez Gender: Male  Patient ID: [id number] YOB: 1950      Visit Date: 2/13/2017 13:22  Age: 66 Years 4 Months Old  Reason for referral: Left hand weakness      Sensory NCS      Nerve / Sites Rec. Site Onset Lat Peak Lat Ref. NP Amp Ref. PP Amp Ref. Segments Distance Velocity     ms ms ms µV µV µV µV  cm m/s   R Median - Digit II (Antidromic)      Wrist Dig II 3.28 4.06 ?3.40 4.1 ?15.0 13.6 ?20.0 Wrist - Dig II 13 40   R Ulnar - Digit V (Antidromic)      Wrist Dig V 2.45 3.02 ?3.10 15.3 ?10.0 20.5 ?15.0 Wrist - Dig V 11 45       Motor NCS      Nerve / Sites Muscle Latency Ref. Amplitude Ref. Amp % Duration Segments Distance Lat Diff Velocity Ref.     ms ms mV mV % ms  cm ms m/s m/s   R Median - APB      Wrist APB 4.17 ?4.40 6.3 ?4.0 100 6.72 Wrist - APB 7         Elbow APB 8.75  6.6  106 6.30 Elbow - Wrist 18 4.58 39 ?49   R Ulnar - ADM      Wrist ADM 2.71 ?3.60 9.4 ?5.0 100 5.36 Wrist - ADM 7         B.Elbow ADM 5.99  6.8  72.5 4.43 B.Elbow - Wrist 20.5 3.28 62 ?49      A.Elbow ADM 8.28  8.9  94.5 4.58 A.Elbow - B.Elbow 10 2.29 44 ?49       EMG      EMG Summary Table     Spontaneous MUAP Recruitment   Muscle IA Fib PSW Fasc H.F. Amp Dur. PPP Pattern   R. Biceps brachii N None None None None N N N N   R. Deltoid N None None None None N N N N   R. Triceps brachii N None None None None N N N N   R. Extensor carpi radialis longus N None None None None N N N N   R. First dorsal interosseous N None None None None N N N N       Summary    The motor conduction test had results outside of the specified normal range in all 2 of the tested nerves:   In the R Median - APB study  o the take off velocity result was reduced for Elbow - Wrist segment   In the R Ulnar - ADM study  o the take off velocity result was reduced for A.Elbow - B.Elbow segment    The  sensory conduction test was performed on 2 nerve(s). The results were normal in 1 nerve(s): R Ulnar - Digit V (Antidromic). Results outside the specified normal range were found in 1 nerve(s), as follows:   In the R Median - Digit II (Antidromic) study  o the peak latency result was increased for Wrist stimulation  o the peak amplitude result was reduced for Wrist stimulation    The needle EMG study was normal in all 5 tested muscles: R. Biceps brachii, R. Deltoid, R. Triceps brachii, R. Extensor carpi radialis longus, R. First dorsal interosseous.          Conclusion:   Right mild to mod carpal tunnel syndrome  Mild right ulnar neuropathy at elbow  Mild right C6/C7 chronic radiculopathy with NO active denervation changes            ____________________________  Yoly Liang D.O.

## 2017-02-13 NOTE — PROGRESS NOTES
HPI:  Patient is a 66 y.o. year old male w. Cervical radic., mod left cts and mild left une. His digits 4 and 5 goes numb and continues to have left shoulder pain.  Referral was placed for nsg but he has not yet gone. emg repeated today did indicate a mild C6/c7 chonic radic. Today his worse pain is his right shoulder.    IMAGING  MRI CERVICAL SPINE WITHOUT CONTRAST    COMPARISON:  None    TECHNIQUE:  Sagittal T1, T2, and STIR;  axial T2 and 3D GRE sequences through the cervical spine were acquired without contrast.      FINDINGS:    There is a mild bursal of the usual cervical lordosis in the mid cervical spine and there is very slight posterior positioning of C5 relative to C6 by 1-2 mm most likely on a degenerative basis.  There is no abnormal marrow signal suggesting acute fracture or osseous destruction    C2-C3: No disc protrusion or spinal canal or neural foramen narrowing    C3-C4: Uncovertebral spurring, mild broad posterior disc bulge, just mild impression on the thecal sac, moderate bilateral neural foramen narrowing    C4-C5: Posterior midline disc protrusion with near complete if not complete effacement of the thecal sac. Mild uncovertebral spurring.  Mild bilateral neural foramen narrowing    C5-C6: Uncovertebral spurring, mild retrolisthesis, broad posterior disc bulge, moderate bilateral neural foramen narrowing, mild impression of the thecal sac    C6-C7: Facet arthropathy, uncovertebral spurring, posterior left paracentral disc protrusion in contact with the anterior margin of the spinal cord without appreciable deformity of the spinal cord or abnormal signal in the spinal cord.  Moderate severe left, moderate right neural foramen narrowing    C7-T1: No spinal canal or neural foramen narrowing    The cervical spinal cord is intrinsically normal in appearance and craniocervical junction is normal in appearance   Impression       C6-C7 there is mild spinal canal narrowing with facet arthropathy,  posterior osteophytes and disc protrusion with the posterior margin of the disc in contact with the anterior margin of the spinal cord.    To a lesser degree there is mild spinal canal narrowing C4-C5, C3-C4, C5-C6.  Multilevel neural foramen narrowing as described                  Electronically signed by: FAIZAN ZAPIEN MD  Date: 01/11/17  Time: 12:06     Encounter   View Encounter          Past Medical History   Diagnosis Date    Coronary artery disease 2002     stent    Hypertension     Kidney stones     MI (myocardial infarction)      Past Surgical History   Procedure Laterality Date    Coronary stent placement      Tonsillectomy      Cardiac surgery      Lithotripsy       Family History   Problem Relation Age of Onset    Diabetes Mother     Stroke Father     Cystic fibrosis Son      Social History     Social History    Marital status:      Spouse name: N/A    Number of children: N/A    Years of education: N/A     Social History Main Topics    Smoking status: Former Smoker     Packs/day: 1.00     Years: 3.00     Start date: 1/1/1967    Smokeless tobacco: Never Used    Alcohol use No      Comment: OCC    Drug use: No    Sexual activity: Not on file     Other Topics Concern    Not on file     Social History Narrative       Review of patient's allergies indicates:  No Known Allergies    Current Outpatient Prescriptions:     amlodipine (NORVASC) 10 MG tablet, Take 1 tablet (10 mg total) by mouth once daily. (Patient taking differently: Take 10 mg by mouth once daily. PRN), Disp: 30 tablet, Rfl: 1        ROS    Review of Systems  No nausea, vomiting, fevers, Chills , contipation, diarrhea or sweats    Physical Exam:      Vitals:    02/13/17 1324   BP: (!) 156/80   Pulse: 76     alert and oriented ×4 follows commands answers all questions appropriately,affect wnl  Manual muscle test 5 out of 5 sensation to light touch grossly intact  +excruciate tenderness b/l CERVICAL paraspinals    Antalgic gait  No C/C/E  +bryan +neers +_impingement 90 deg  Assessment:  CERVICAL RADIC  MOD CTS LEFT  MILD LEFT UNE  Probable rtc tear vs tendintis  Plan:  REFER AGAIN TO NSG  Shoulder xray  Therapeutic diagnositic injection of shoulder next encounter

## 2017-02-13 NOTE — MR AVS SNAPSHOT
[place]  [address] [place] [address]  [place] [address]  Phone: [phone]  Fax: [phone]                  Neptali Gonzalez   2017 1:40 PM   Office Visit    Description:  Male : 1950   Provider:  Yoly Liang DO   Department:  [facility]           Reason for Visit     Follow-up     Arm Pain           Diagnoses this Visit        Comments    Chronic right shoulder pain    -  Primary            To Do List           Future Appointments        Provider Department Dept Phone    2017 2:45 PM [facility] [id number] [facility] [phone]    2017 12:00 PM Ivette Pace, PTA [facility] [phone]    2017 10:00 AM Ivette Pace, PTA [facility] [phone]    2017 12:00 PM Arianna Stephens, PT [facility] [phone]    2017 2:00 PM Linda Doss, PTA [facility] [phone]      Goals (5 Years of Data)     None      Follow-Up and Disposition     Return in about 4 weeks (around 3/13/2017).    Follow-up and Disposition History      [facility]     [facility] - 24/7 Assistance  Registered nurses in the [facility] provide clinical advisement, health education, appointment booking, and other advisory services.  Call for this free service at [phone].             Medications           Message regarding Medications     Verify the changes and/or additions to your medication regime listed below are the same as discussed with your clinician today.  If any of these changes or additions are incorrect, please notify your healthcare provider.             Verify that the below list of medications is an accurate representation of the medications you are currently taking.  If none reported, the list may be blank. If incorrect, please contact your healthcare provider. Carry this list with you in  "case of emergency.           Current Medications     amlodipine (NORVASC) 10 MG tablet Take 1 tablet (10 mg total) by mouth once daily.           Clinical Reference Information           Your Vitals Were     BP Pulse Height Weight BMI    156/80 76 5' 6" (1.676 m) 76.9 kg (169 lb 6.8 oz) 27.35 kg/m2      Blood Pressure          Most Recent Value    BP  (!)  156/80      Allergies as of 2/13/2017     No Known Allergies      Immunizations Administered on Date of Encounter - 2/13/2017     None      Orders Placed During Today's Visit     Future Labs/Procedures Expected by Expires    X-ray Shoulder 2 or More Views Right  2/13/2017 2/13/2018      Language Assistance Services     ATTENTION: Language assistance services are available, free of charge. Please call [phone].      ATENCIÓN: Si ranla margy, tiene a vegas disposición servicios gratuitos de asistencia lingüística. Llame al [phone].     TOSHIA Ý: N?u b?n nói Ti?ng Vi?t, có các d?ch v? h? tr? ngôn ng? mi?n phí dành cho b?n. G?i s? [phone].         [facility] complies with applicable Federal civil rights laws and does not discriminate on the basis of race, color, national origin, age, disability, or sex.        "

## 2017-02-14 ENCOUNTER — TELEPHONE (OUTPATIENT)
Dept: REHABILITATION | Facility: HOSPITAL | Age: 67
End: 2017-02-14

## 2017-02-21 ENCOUNTER — CLINICAL SUPPORT (OUTPATIENT)
Dept: REHABILITATION | Facility: HOSPITAL | Age: 67
End: 2017-02-21
Attending: PHYSICAL MEDICINE & REHABILITATION
Payer: MEDICARE

## 2017-02-21 DIAGNOSIS — M54.12 CERVICAL RADICULAR PAIN: ICD-10-CM

## 2017-02-21 PROCEDURE — 97140 MANUAL THERAPY 1/> REGIONS: CPT | Mod: PN

## 2017-02-21 PROCEDURE — 97110 THERAPEUTIC EXERCISES: CPT | Mod: PN

## 2017-02-21 NOTE — PROGRESS NOTES
"Name: Neptali Burleson [facility] Number: [id number]  Date of Treatment: 02/21/2017   Diagnosis:   Encounter Diagnosis   Name Primary?    Cervical radicular pain        Time in: 1203  Time Out: 1255  Total Treatment Time: 52  Group Time: 0      Subjective:    Neptali reports continued R shoulder and elbow soreness.  Patient reports their pain to be 7/10 on a 0-10 scale with 0 being no pain and 10 being the worst pain imaginable.    Objective    Neptali received therapeutic exercises to develop strength, endurance and flexibility for 44 minutes including:     UBE 5'/5'  Scapular squeeze 3x10  Shoulder shrugs 3x10  Retro shoulder rolls  UT stretch R 3x30"  LS stretch R 3x30"   Chin tuck 3x10  R hand ex squeezing and releasing beige putty 4'      Neptali received the following manual therapy techniques: Myofacial release and Soft tissue Mobilization were applied to the: UT B for 8 minutes.     Written Home Exercises Provided: yes    Pt demo good understanding of the education provided. Neptali demonstrated good return demonstration of activities with cues for direction and proper form.     Assessment:     Pt will continue to benefit from skilled PT intervention. Medical Necessity is demonstrated by:  Pain limits function of effected part for some activities, Unable to participate fully in daily activities, Requires skilled supervision to complete and progress HEP and Weakness.    Patient is making fair progress towards established goals.    Plan:    Continue with established Plan of Care towards PT goals.   "

## 2017-03-02 ENCOUNTER — TELEPHONE (OUTPATIENT)
Dept: PAIN MEDICINE | Facility: CLINIC | Age: 67
End: 2017-03-02

## 2017-03-02 NOTE — TELEPHONE ENCOUNTER
----- Message from Litzy Raymond sent at 3/2/2017  8:46 AM CST -----  Contact: Patient  Patient requesting another appointment. Had emergency unable to keep appointment for 03/03/17. Please call back at [phone] thanks,

## 2017-03-06 ENCOUNTER — TELEPHONE (OUTPATIENT)
Dept: PHYSICAL MEDICINE AND REHAB | Facility: CLINIC | Age: 67
End: 2017-03-06

## 2017-03-07 ENCOUNTER — OFFICE VISIT (OUTPATIENT)
Dept: PAIN MEDICINE | Facility: CLINIC | Age: 67
End: 2017-03-07
Payer: MEDICARE

## 2017-03-07 ENCOUNTER — OFFICE VISIT (OUTPATIENT)
Dept: PHYSICAL MEDICINE AND REHAB | Facility: CLINIC | Age: 67
End: 2017-03-07
Payer: MEDICARE

## 2017-03-07 ENCOUNTER — TELEPHONE (OUTPATIENT)
Dept: REHABILITATION | Facility: HOSPITAL | Age: 67
End: 2017-03-07

## 2017-03-07 VITALS
HEIGHT: 66 IN | HEART RATE: 79 BPM | BODY MASS INDEX: 27.14 KG/M2 | DIASTOLIC BLOOD PRESSURE: 78 MMHG | WEIGHT: 168.88 LBS | SYSTOLIC BLOOD PRESSURE: 141 MMHG

## 2017-03-07 VITALS
HEIGHT: 66 IN | SYSTOLIC BLOOD PRESSURE: 162 MMHG | HEART RATE: 79 BPM | DIASTOLIC BLOOD PRESSURE: 87 MMHG | WEIGHT: 168 LBS | BODY MASS INDEX: 27 KG/M2

## 2017-03-07 DIAGNOSIS — M50.30 DDD (DEGENERATIVE DISC DISEASE), CERVICAL: ICD-10-CM

## 2017-03-07 DIAGNOSIS — R29.898 HAND WEAKNESS: ICD-10-CM

## 2017-03-07 DIAGNOSIS — M25.519 SHOULDER PAIN, UNSPECIFIED CHRONICITY, UNSPECIFIED LATERALITY: Primary | ICD-10-CM

## 2017-03-07 DIAGNOSIS — G89.29 CHRONIC RIGHT SHOULDER PAIN: ICD-10-CM

## 2017-03-07 DIAGNOSIS — R29.898 RIGHT HAND WEAKNESS: Primary | ICD-10-CM

## 2017-03-07 DIAGNOSIS — G56.21 ULNAR NEUROPATHY AT ELBOW OF RIGHT UPPER EXTREMITY: ICD-10-CM

## 2017-03-07 DIAGNOSIS — M25.511 CHRONIC RIGHT SHOULDER PAIN: ICD-10-CM

## 2017-03-07 PROCEDURE — 1159F MED LIST DOCD IN RCRD: CPT | Mod: S$GLB,,, | Performed by: PHYSICIAN ASSISTANT

## 2017-03-07 PROCEDURE — 1160F RVW MEDS BY RX/DR IN RCRD: CPT | Mod: S$GLB,,, | Performed by: PHYSICIAN ASSISTANT

## 2017-03-07 PROCEDURE — 3077F SYST BP >= 140 MM HG: CPT | Mod: S$GLB,,, | Performed by: PHYSICAL MEDICINE & REHABILITATION

## 2017-03-07 PROCEDURE — 1125F AMNT PAIN NOTED PAIN PRSNT: CPT | Mod: S$GLB,,, | Performed by: PHYSICIAN ASSISTANT

## 2017-03-07 PROCEDURE — 3078F DIAST BP <80 MM HG: CPT | Mod: S$GLB,,, | Performed by: PHYSICAL MEDICINE & REHABILITATION

## 2017-03-07 PROCEDURE — 1160F RVW MEDS BY RX/DR IN RCRD: CPT | Mod: S$GLB,,, | Performed by: PHYSICAL MEDICINE & REHABILITATION

## 2017-03-07 PROCEDURE — 1157F ADVNC CARE PLAN IN RCRD: CPT | Mod: S$GLB,,, | Performed by: PHYSICIAN ASSISTANT

## 2017-03-07 PROCEDURE — 99999 PR PBB SHADOW E&M-EST. PATIENT-LVL III: CPT | Mod: PBBFAC,,, | Performed by: PHYSICAL MEDICINE & REHABILITATION

## 2017-03-07 PROCEDURE — 20611 DRAIN/INJ JOINT/BURSA W/US: CPT | Mod: RT,S$GLB,, | Performed by: PHYSICAL MEDICINE & REHABILITATION

## 2017-03-07 PROCEDURE — 99214 OFFICE O/P EST MOD 30 MIN: CPT | Mod: S$GLB,,, | Performed by: PHYSICIAN ASSISTANT

## 2017-03-07 PROCEDURE — 99999 PR PBB SHADOW E&M-EST. PATIENT-LVL III: CPT | Mod: PBBFAC,,, | Performed by: PHYSICIAN ASSISTANT

## 2017-03-07 PROCEDURE — 3077F SYST BP >= 140 MM HG: CPT | Mod: S$GLB,,, | Performed by: PHYSICIAN ASSISTANT

## 2017-03-07 PROCEDURE — 1159F MED LIST DOCD IN RCRD: CPT | Mod: S$GLB,,, | Performed by: PHYSICAL MEDICINE & REHABILITATION

## 2017-03-07 PROCEDURE — 1157F ADVNC CARE PLAN IN RCRD: CPT | Mod: S$GLB,,, | Performed by: PHYSICAL MEDICINE & REHABILITATION

## 2017-03-07 PROCEDURE — 99214 OFFICE O/P EST MOD 30 MIN: CPT | Mod: 25,S$GLB,, | Performed by: PHYSICAL MEDICINE & REHABILITATION

## 2017-03-07 PROCEDURE — 3079F DIAST BP 80-89 MM HG: CPT | Mod: S$GLB,,, | Performed by: PHYSICIAN ASSISTANT

## 2017-03-07 RX ORDER — LIDOCAINE HYDROCHLORIDE 10 MG/ML
1 INJECTION INFILTRATION; PERINEURAL
Status: COMPLETED | OUTPATIENT
Start: 2017-03-07 | End: 2017-03-07

## 2017-03-07 RX ORDER — METHYLPREDNISOLONE ACETATE 40 MG/ML
40 INJECTION, SUSPENSION INTRA-ARTICULAR; INTRALESIONAL; INTRAMUSCULAR; SOFT TISSUE ONCE
Status: COMPLETED | OUTPATIENT
Start: 2017-03-07 | End: 2017-03-07

## 2017-03-07 RX ADMIN — LIDOCAINE HYDROCHLORIDE 1 ML: 10 INJECTION INFILTRATION; PERINEURAL at 12:03

## 2017-03-07 RX ADMIN — METHYLPREDNISOLONE ACETATE 40 MG: 40 INJECTION, SUSPENSION INTRA-ARTICULAR; INTRALESIONAL; INTRAMUSCULAR; SOFT TISSUE at 12:03

## 2017-03-07 NOTE — MR AVS SNAPSHOT
[facility]  [address] [place] [address] [place] [address]  Phone: [phone]  Fax: [phone]                  Neptali Gonzalez   3/7/2017 11:40 AM   Office Visit    Description:  Male : 1950   Provider:  Yoly Liang DO   Department:  [facility]           Diagnoses this Visit        Comments    Shoulder pain, unspecified chronicity, unspecified laterality    -  Primary     Hand weakness                To Do List           Future Appointments        Provider Department Dept Phone    3/7/2017 3:30 PM KATIE Joy - Pain Management [phone]    3/9/2017 12:00 PM Arianna Stephens, PT [facility] [phone]    3/15/2017 9:00 AM [facility] MRI1 300 LB LIMIT [facility] [phone]    3/27/2017 2:40 PM Tuan Thurston Jr., MD [facility] [phone]    2017 11:00 AM Yoly Liang DO [facility] [phone]      Goals (5 Years of Data)     None      Follow-Up and Disposition     Return in about 4 weeks (around 2017).      [facility]     [facility] Care Line - 24/7 Assistance  Registered nurses in the [facility] provide clinical advisement, health education, appointment booking, and other advisory services.  Call for this free service at [phone].             Medications           Message regarding Medications     Verify the changes and/or additions to your medication regime listed below are the same as discussed with your clinician today.  If any of these changes or additions are incorrect, please notify your healthcare provider.        These medications were administered today        Dose Freq    lidocaine HCL 10 mg/ml (1%) injection 1 mL 1 mL Clinic/HOD 1 time    Si mL by Other route one time.    Class: Normal    Route: Other    methylPREDNISolone acetate injection 40 mg 40 mg Once    Si mL (40 mg  "total) by Intra-Lesional route once.    Class: Normal    Route: Intra-Lesional           Verify that the below list of medications is an accurate representation of the medications you are currently taking.  If none reported, the list may be blank. If incorrect, please contact your healthcare provider. Carry this list with you in case of emergency.           Current Medications     amlodipine (NORVASC) 10 MG tablet Take 1 tablet (10 mg total) by mouth once daily.           Clinical Reference Information           Your Vitals Were     BP Pulse Height Weight BMI    141/78 79 5' 6" (1.676 m) 76.6 kg (168 lb 14 oz) 27.26 kg/m2      Blood Pressure          Most Recent Value    BP  (!)  141/78      Allergies as of 3/7/2017     No Known Allergies      Immunizations Administered on Date of Encounter - 3/7/2017     None      Orders Placed During Today's Visit      Normal Orders This Visit    Ambulatory Referral to Orthopedics      US Guidance For Needle Placement     Future Labs/Procedures Expected by Expires    MRI Upper Extremity Joint Without Contrast Right  3/7/2017 3/7/2018      Administrations This Visit     lidocaine HCL 10 mg/ml (1%) injection 1 mL     Admin Date Action Dose Route Administered By             03/07/2017 Given 1 mL Other Yoly iLang DO                    methylPREDNISolone acetate injection 40 mg     Admin Date Action Dose Route Administered By             03/07/2017 Given 40 mg Intra-Lesional Yoly Liang DO                      Language Assistance Services     ATTENTION: Language assistance services are available, free of charge. Please call [phone].      ATENCIÓN: Si habla español, tiene a vegas disposición servicios gratuitos de asistencia lingüística. Llame al [phone].     [facility] Ý: N?u b?n nói Ti?ng Vi?t, có các d?ch v? h? tr? ngôn ng? mi?n phí dành cho b?n. G?i s? [phone].         [facility] complies with applicable Federal civil rights " laws and does not discriminate on the basis of race, color, national origin, age, disability, or sex.

## 2017-03-07 NOTE — PROGRESS NOTES
Referring Physician: No ref. provider found    PCP: Jarett Davenport Iv, MD      CC: right hand weakness    Interval History:  Mr. Gonzalez is a 66 y.o. female with right hand weakness x 7 months who presents today for f/u s/p cervical GABRIELE. Reports no improvement in pain or weakness. He had a right shoulder injection with Dr. Liang today. He is scheduled to see Dr. Thurston for right ulnar neuropathy. He continues to have constant aching, numbness in his right ring finger and pinky.  He feels radiating pain up his wrist into his shoulder at times.  Pain worsens with lifting of his right arm and lifting.  He does have some mild posterior neck pain.  He denies any radiating pain from his neck to his hands.  He reports weakness in those fingers.  No incoordination. Pain today is rated 7/10.  Pt has been seen in the clinic before, however pt is new to me.     History below per Dr. Johnston    HPI:   Patient is 66-year-old male referred to us for right hand weakness.  Pain has progressed over last 6 months.  No traumatic incident.  His constant aching, numbness in his right ring finger and pinky.  He feels radiating pain up his wrist into his shoulder at times.  Pain worsens with lifting of his right arm and lifting.  He does have some mild posterior neck pain.  He denies any radiating pain from his neck to his hands.  He reports weakness in those fingers.  No incoordination.  No bowel bladder changes.  He is scheduled for EMG study with Dr. Liang in the near future.  MRI of the cervical spine did show some degenerative changes in his neck.  He rates his pain 6/10 today.    ROS:  CONSTITUTIONAL: No fevers, chills, night sweats, wt. loss, appetite changes  SKIN: no rashes or itching  ENT: No headaches, head trauma, vision changes, or eye pain  LYMPH NODES: None noticed   CV: No chest pain, palpitations.   RESP: No shortness of breath, dyspnea on exertion, cough, wheezing, or hemoptysis  GI: No nausea, emesis, diarrhea,  "constipation, melena, hematochezia, pain.    : No dysuria, hematuria, urgency, or frequency   HEME: No easy bruising, bleeding problems  PSYCHIATRIC: No depression, anxiety, psychosis, hallucinations.  NEURO: No seizures, memory loss, dizziness or difficulty sleeping  MSK: + History of present illness      Past Medical History:   Diagnosis Date    Coronary artery disease 2002    stent    Hypertension     Kidney stones     MI (myocardial infarction)      Past Surgical History:   Procedure Laterality Date    CARDIAC SURGERY      CORONARY STENT PLACEMENT      LITHOTRIPSY      TONSILLECTOMY       Family History   Problem Relation Age of Onset    Diabetes Mother     Stroke Father     Cystic fibrosis Son      Social History     Social History    Marital status:      Spouse name: N/A    Number of children: N/A    Years of education: N/A     Social History Main Topics    Smoking status: Former Smoker     Packs/day: 1.00     Years: 3.00     Start date: 1/1/1967    Smokeless tobacco: Never Used    Alcohol use No      Comment: OCC    Drug use: No    Sexual activity: Not Asked     Other Topics Concern    None     Social History Narrative         Medications/Allergies: See med card    Vitals:    03/07/17 1318   BP: (!) 162/87   Pulse: 79   Weight: 76.2 kg (168 lb)   Height: 5' 6" (1.676 m)   PainSc:   7   PainLoc: Shoulder         Physical exam:    GENERAL: A and O x3, the patient appears well groomed and is in no acute distress.  Skin: No rashes or obvious lesions  HEENT: normocephalic, atraumatic  CARDIOVASCULAR:  RRR  LUNGS: non labored breathing  ABDOMEN: soft, nontender   UPPER EXTREMITIES: Decreased ROM right UE, decreased hand  on right  LOWER EXTREMITIES:  Normal alignment, normal range of motion, no atrophy, no skin changes,  hair growth and nail growth normal and equal bilaterally. No swelling, no tenderness.  CERVICAL SPINE:  Cervical spine: ROM is full in flexion, extension and " lateral rotation without increased pain.  Spurling's maneuver causes no neck pain to either side.  Myofascial exam: No Tenderness to palpation across cervical paraspinous region bilaterally.      MENTAL STATUS: normal orientation, speech, language, and fund of knowledge for social situation.  Emotional state appropriate.    CRANIAL NERVES:  II:  PERRL bilaterally,   III,IV,VI: EOMI.    V:  Facial sensation equal bilaterally  VII:  Facial motor function normal.  VIII:  Hearing equal to finger rub bilaterally  IX/X: Gag normal, palate symmetric  XI:  Shoulder shrug equal, head turn equal  XII:  Tongue midline without fasciculations      MOTOR: Tone and bulk: normal bilateral upper and lower Strength: normal   Delt Bi Tri WE WF     R 5 5 5 5 5 4   L 5 5 5 5 5 5     IP ADD ABD Quad TA Gas HAM  R 5 5 5 5 5 5 5  L 5 5 5 5 5 5 5    SENSATION: Light touch and pinprick intact bilaterally  REFLEXES: normal, symmetric, nonbrisk.  Toes down, no clonus. No hoffmans.  GAIT: normal rise, base, steps, and arm swing.        Imaging:  MRI C-spine 12/2016  C6-C7 there is mild spinal canal narrowing with facet arthropathy, posterior osteophytes and disc protrusion with the posterior margin of the disc in contact with the anterior margin of the spinal cord.    To a lesser degree there is mild spinal canal narrowing C4-C5, C3-C4, C5-C6.  Multilevel neural foramen narrowing as described    Assessment:  Mr. Gonzalez is a 66 y.o. male with right hand weakness  1. Right hand weakness    2. DDD (degenerative disc disease), cervical    3. Ulnar neuropathy at elbow of right upper extremity    4. Chronic right shoulder pain          Plan:  1. I have stressed the importance of physical activity and exercise to improve overall health  2. Follow up with Dr. Thurston for ulnar neuropathy  3. If minimal relief with intraarticular right shoulder injection, recommend right shoulder MRI for further evaluation.   4.  Follow up with PCP regarding  elevated BP  5. F/u prn

## 2017-03-07 NOTE — PROGRESS NOTES
HPI:  Patient is a 66 y.o. year old male w. Right shoulder pain, neck pain and hand weakness.  EMG indic. Mild cervical radic c6/c7, mild une and mod cts.  Physical therapy has not helped shoulder pain. Pt. Also has numbness down right arm. Pt. Does have neuroforaminal narrowing and mild cervical spondylosis. Pt. Is having difficulty w. Right hand function. Hand has stayed the same after carpal tunnel surgery. 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 He feels hand is becoming weaker. He is unable to do overhead activities d/t shoulder pain.    Imaging  Right shoulder 3 views    Skeletal structures are osteopenic.  There are degenerative changes.  There is no acute fracture or dislocation   Impression       Osteopenia and degenerative change         MRI CERVICAL SPINE WITHOUT CONTRAST    COMPARISON:  None    TECHNIQUE:  Sagittal T1, T2, and STIR;  axial T2 and 3D GRE sequences through the cervical spine were acquired without contrast.      FINDINGS:    There is a mild bursal of the usual cervical lordosis in the mid cervical spine and there is very slight posterior positioning of C5 relative to C6 by 1-2 mm most likely on a degenerative basis.  There is no abnormal marrow signal suggesting acute fracture or osseous destruction    C2-C3: No disc protrusion or spinal canal or neural foramen narrowing    C3-C4: Uncovertebral spurring, mild broad posterior disc bulge, just mild impression on the thecal sac, moderate bilateral neural foramen narrowing    C4-C5: Posterior midline disc protrusion with near complete if not complete effacement of the thecal sac. Mild uncovertebral spurring.  Mild bilateral neural foramen narrowing    C5-C6: Uncovertebral spurring, mild retrolisthesis, broad posterior disc bulge, moderate bilateral neural foramen narrowing, mild impression of the thecal sac    C6-C7: Facet arthropathy, uncovertebral spurring, posterior left paracentral disc protrusion in  contact with the anterior margin of the spinal cord without appreciable deformity of the spinal cord or abnormal signal in the spinal cord.  Moderate severe left, moderate right neural foramen narrowing    C7-T1: No spinal canal or neural foramen narrowing    The cervical spinal cord is intrinsically normal in appearance and craniocervical junction is normal in appearance   Impression       C6-C7 there is mild spinal canal narrowing with facet arthropathy, posterior osteophytes and disc protrusion with the posterior margin of the disc in contact with the anterior margin of the spinal cord.    To a lesser degree there is mild spinal canal narrowing C4-C5, C3-C4, C5-C6.  Multilevel neural foramen narrowing as described                  Electronically signed by: FAIZAN ZAPIEN MD  Date: 01/11/17  Time: 12:06     Encounter   Vie        7/2016  Mod right cts  Mild right une    EMG was repeated in 2/2017 which also indicated mild C6/C7 radic chronic.    Past Medical History:   Diagnosis Date    Coronary artery disease 2002    stent    Hypertension     Kidney stones     MI (myocardial infarction)      Past Surgical History:   Procedure Laterality Date    CARDIAC SURGERY      CORONARY STENT PLACEMENT      LITHOTRIPSY      TONSILLECTOMY       Family History   Problem Relation Age of Onset    Diabetes Mother     Stroke Father     Cystic fibrosis Son      Social History     Social History    Marital status:      Spouse name: N/A    Number of children: N/A    Years of education: N/A     Social History Main Topics    Smoking status: Former Smoker     Packs/day: 1.00     Years: 3.00     Start date: 1/1/1967    Smokeless tobacco: Never Used    Alcohol use No      Comment: OCC    Drug use: No    Sexual activity: Not Asked     Other Topics Concern    None     Social History Narrative       Review of patient's allergies indicates:  No Known Allergies    Current Outpatient Prescriptions:     amlodipine  "(NORVASC) 10 MG tablet, Take 1 tablet (10 mg total) by mouth once daily. (Patient taking differently: Take 10 mg by mouth once daily. PRN), Disp: 30 tablet, Rfl: 1            Review of Systems  No nausea, vomiting, fevers, Chills , contipation, diarrhea or sweats  Physical Exam:      Vitals:    03/07/17 1151   BP: (!) 141/78   Pulse: 79   alert and oriented ×4 follows commands answers all questions appropriately,affect wnl  Manual muscle test 5 out of 5 sensation to light touch grossly intact  +excruciate tenderness b/l QL and paraspinals lumbar  Antalgic gait  No C/C/E  Dec rom of shoulder, +impingement 90 deg abduction and fwd flexion      Assessment:  Shoulder pain w. Shoulder OA and possible rtc tear  Hand weakness s/p ctr no improvement  Cervical spondylosis  Cervical radic mild C6/C7  Mild right une, and mod right cts per emg s/p ctr w. No improvement  Plan:  MRI right shoulder  Discussed prp   Will therapeutic/diagnostic injection of cortisone subacromially- hold off on therapy of shoulder for 1 wk  Refer to Dr. Thurston for hand weakness  F/u w.  regarding cervical radic-= for diagnostic/therapeutic tammy    Procedure note: Risk and benefit of US guided rightubacromial bursa  injection given to pt. 25 g 1.5" utilized. Injection performed after sterile prep w. Betadine , verbal consent explained, no complications. Depomedrol 40mg 1ml and lidocaine 1ml were used, US guidance was used since it has been shown to have greater efficiency w. Accurate needle placement  Ultrasound interpretation was performed prior to the procedure to identify the target and any adjacent neurovascular structures.  Subsequently, interpretation was performed during real- time needle guidance confirming placement. Post- intervention interpretation was also performed confirming appropriate injectate flow and hemostasis.  Images indicating needle placement have been saved in [facility].              "

## 2017-03-15 ENCOUNTER — HOSPITAL ENCOUNTER (OUTPATIENT)
Dept: RADIOLOGY | Facility: HOSPITAL | Age: 67
Discharge: HOME | End: 2017-03-15
Attending: PHYSICAL MEDICINE & REHABILITATION
Payer: MEDICARE

## 2017-03-15 DIAGNOSIS — M25.519 SHOULDER PAIN, UNSPECIFIED CHRONICITY, UNSPECIFIED LATERALITY: ICD-10-CM

## 2017-03-15 PROCEDURE — 73221 MRI JOINT UPR EXTREM W/O DYE: CPT | Mod: TC,RT

## 2017-03-15 PROCEDURE — 73221 MRI JOINT UPR EXTREM W/O DYE: CPT | Mod: 26,RT,, | Performed by: RADIOLOGY

## 2017-03-27 ENCOUNTER — OFFICE VISIT (OUTPATIENT)
Dept: ORTHOPEDICS | Facility: CLINIC | Age: 67
End: 2017-03-27
Payer: MEDICARE

## 2017-03-27 VITALS — WEIGHT: 168 LBS | HEIGHT: 66 IN | BODY MASS INDEX: 27 KG/M2

## 2017-03-27 DIAGNOSIS — R20.0 NUMBNESS OF RIGHT HAND: ICD-10-CM

## 2017-03-27 DIAGNOSIS — R20.0 RIGHT ARM NUMBNESS: Primary | ICD-10-CM

## 2017-03-27 PROCEDURE — 99213 OFFICE O/P EST LOW 20 MIN: CPT | Mod: S$GLB,,, | Performed by: ORTHOPAEDIC SURGERY

## 2017-03-27 PROCEDURE — 99999 PR PBB SHADOW E&M-EST. PATIENT-LVL III: CPT | Mod: PBBFAC,,, | Performed by: ORTHOPAEDIC SURGERY

## 2017-03-27 PROCEDURE — 1160F RVW MEDS BY RX/DR IN RCRD: CPT | Mod: S$GLB,,, | Performed by: ORTHOPAEDIC SURGERY

## 2017-03-27 PROCEDURE — 1125F AMNT PAIN NOTED PAIN PRSNT: CPT | Mod: S$GLB,,, | Performed by: ORTHOPAEDIC SURGERY

## 2017-03-27 PROCEDURE — 1159F MED LIST DOCD IN RCRD: CPT | Mod: S$GLB,,, | Performed by: ORTHOPAEDIC SURGERY

## 2017-03-27 PROCEDURE — 1157F ADVNC CARE PLAN IN RCRD: CPT | Mod: S$GLB,,, | Performed by: ORTHOPAEDIC SURGERY
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
## 2017-03-27 NOTE — LETTER
March 27, 2017        Yoly Liang, [address] [place] [address]             [facility]  [address] [place] [address] [address]  [place] [address]  Phone: [phone]   Patient: Neptali Gonzalez   MR Number: [id number]   YOB: 1950   Date of Visit: 3/27/2017       Dear Dr. Liang:    Thank you for referring Neptali Gonzalez to me for evaluation. Below are the relevant portions of my assessment and plan of care.            If you have questions, please do not hesitate to call me. I look forward to following Neptali along with you.    Sincerely,      Tuan Thurston Jr., MD           CC  No Recipients

## 2017-03-27 NOTE — LETTER
March 27, 2017      Yoly Liang [address] [place] [address]           [facility]  [address] [place] [address] [address]  [place] [address]  Phone: [phone]          Patient: Neptali Gonzalez   MR Number: [id number]   YOB: 1950   Date of Visit: 3/27/2017       Dear Dr. Yoly Liang:    Thank you for referring Neptali Gonzalez to me for evaluation. Attached you will find relevant portions of my assessment and plan of care.    If you have questions, please do not hesitate to call me. I look forward to following Neptali Gonzalez along with you.    Sincerely,    Tuan Thurston Jr., MD    Enclosure  CC:  No Recipients    If you would like to receive this communication electronically, please contact [email or web address] or [phone] to request more information on [facility] access.    For providers and/or their staff who would like to refer a patient to [facility], please contact us through our one-stop-shop provider referral line, [facility], at [phone].    If you feel you have received this communication in error or would no longer like to receive these types of communications, please e-mail [email or web address]

## 2017-04-11 ENCOUNTER — OFFICE VISIT (OUTPATIENT)
Dept: PHYSICAL MEDICINE AND REHAB | Facility: CLINIC | Age: 67
End: 2017-04-11
Payer: MEDICARE

## 2017-04-11 VITALS
BODY MASS INDEX: 26.58 KG/M2 | SYSTOLIC BLOOD PRESSURE: 158 MMHG | WEIGHT: 165.38 LBS | DIASTOLIC BLOOD PRESSURE: 76 MMHG | HEIGHT: 66 IN | HEART RATE: 59 BPM

## 2017-04-11 DIAGNOSIS — M25.519 SHOULDER PAIN, UNSPECIFIED CHRONICITY, UNSPECIFIED LATERALITY: Primary | ICD-10-CM

## 2017-04-11 PROCEDURE — 1157F ADVNC CARE PLAN IN RCRD: CPT | Mod: S$GLB,,, | Performed by: PHYSICAL MEDICINE & REHABILITATION

## 2017-04-11 PROCEDURE — 3077F SYST BP >= 140 MM HG: CPT | Mod: S$GLB,,, | Performed by: PHYSICAL MEDICINE & REHABILITATION

## 2017-04-11 PROCEDURE — 99214 OFFICE O/P EST MOD 30 MIN: CPT | Mod: S$GLB,,, | Performed by: PHYSICAL MEDICINE & REHABILITATION

## 2017-04-11 PROCEDURE — 99999 PR PBB SHADOW E&M-EST. PATIENT-LVL II: CPT | Mod: PBBFAC,,, | Performed by: PHYSICAL MEDICINE & REHABILITATION

## 2017-04-11 PROCEDURE — 1160F RVW MEDS BY RX/DR IN RCRD: CPT | Mod: S$GLB,,, | Performed by: PHYSICAL MEDICINE & REHABILITATION

## 2017-04-11 PROCEDURE — 3078F DIAST BP <80 MM HG: CPT | Mod: S$GLB,,, | Performed by: PHYSICAL MEDICINE & REHABILITATION

## 2017-04-11 PROCEDURE — 1125F AMNT PAIN NOTED PAIN PRSNT: CPT | Mod: S$GLB,,, | Performed by: PHYSICAL MEDICINE & REHABILITATION

## 2017-04-11 PROCEDURE — 1159F MED LIST DOCD IN RCRD: CPT | Mod: S$GLB,,, | Performed by: PHYSICAL MEDICINE & REHABILITATION

## 2017-04-11 NOTE — PROGRESS NOTES
HPI:  Patient is a 66 y.o. year old male w. Rigidity of his arm, inability to raise his shoulder, and peripheral nerve entrapment w. Hand and arm weakness.  Dr. Thurston evaluated the pt and is suspicious for parkinson's. He has an appt. W. Neurology tomorrow.  Mri did indicate a partial tear of supraspinatus. Pt. Is unable to raise his arm.It becomes painful w. Raising the arm.    MRi  MRI RIGHT shoulder without contrast    COMPARISON: None.    TECHNIQUE: Axial PD FS, sagittal and coronal T2 FS, and sagittal and coronal T1 sequences through the RIGHT shoulder were acquired without contrast.    FINDINGS:    ROTATOR CUFF:  There is a partial-thickness bursal surface tear of the supraspinatus tendon involving greater than 50% of the tendon thickness and measuring approximately 2.3 x 2.0 cm AP by transverse.  Mild tendinosis of the infraspinatus and subscapularis tendons is also present.    LABRUM:  The labrum is diffusely degenerated.    LONG HEAD BICEPS TENDON:  Intact and located.    BONES:  No fracture or contusion. Moderate acromioclavicular joint arthrosis is present.    MUSCLES:  Rotator cuff muscle bulk is well maintained.    MISC:  No joint effusion.   Impression     Partial thickness bursal surface tear of the supraspinatus tendon.               Past Medical History:   Diagnosis Date    Coronary artery disease 2002    stent    Hypertension     Kidney stones     MI (myocardial infarction)      Past Surgical History:   Procedure Laterality Date    CARDIAC SURGERY      CORONARY STENT PLACEMENT      LITHOTRIPSY      TONSILLECTOMY       Family History   Problem Relation Age of Onset    Diabetes Mother     Stroke Father     Cystic fibrosis Son      Social History     Social History    Marital status:      Spouse name: N/A    Number of children: N/A    Years of education: N/A     Social History Main Topics    Smoking status: Former Smoker     Packs/day: 1.00     Years: 3.00     Start date:  1/1/1967    Smokeless tobacco: Never Used    Alcohol use No      Comment: OCC    Drug use: No    Sexual activity: Not on file     Other Topics Concern    Not on file     Social History Narrative       Review of patient's allergies indicates:  No Known Allergies    Current Outpatient Prescriptions:     amlodipine (NORVASC) 10 MG tablet, Take 1 tablet (10 mg total) by mouth once daily. (Patient taking differently: Take 10 mg by mouth once daily. PRN), Disp: 30 tablet, Rfl: 1        Review of Systems  No nausea, vomiting, fevers, Chills , contipation, diarrhea or sweats      Physical Exam:      Vitals:    04/11/17 1110   BP: (!) 158/76   Pulse: (!) 59     alert and oriented ×4 follows commands answers all questions appropriately,affect wnl  Manual muscle test 5 out of 5 sensation to light touch grossly intact  +impingement of right shoulder, +hawkin's+neers  Nl gait  No C/C/E  +resting tremor of right hand  Blunted fascies  No shuffle w. gait  Assessment:  50% partial tear of supraspinatus  Hand weakness w. Peripheral nerve entrapment in EMG-mild right une, mod right cts s/p release  Cervical ddd w. Arm weakness-emg mild C6/7 radic  Plan:  Keep appt. W. Neurology as referred by ortho- I don't this is parkinson's but would like to see their opinion  He would like to get prp for shoulder's rtc tear- will schedule

## 2017-04-13 ENCOUNTER — OFFICE VISIT (OUTPATIENT)
Dept: NEUROLOGY | Facility: CLINIC | Age: 67
End: 2017-04-13
Payer: MEDICARE

## 2017-04-13 VITALS
SYSTOLIC BLOOD PRESSURE: 167 MMHG | HEART RATE: 58 BPM | BODY MASS INDEX: 27.25 KG/M2 | HEIGHT: 66 IN | WEIGHT: 169.56 LBS | DIASTOLIC BLOOD PRESSURE: 78 MMHG

## 2017-04-13 DIAGNOSIS — G24.1 ADULT ONSET FOCAL TORSION DYSTONIA: ICD-10-CM

## 2017-04-13 PROCEDURE — 3077F SYST BP >= 140 MM HG: CPT | Mod: S$GLB,,, | Performed by: PSYCHIATRY & NEUROLOGY

## 2017-04-13 PROCEDURE — 1125F AMNT PAIN NOTED PAIN PRSNT: CPT | Mod: S$GLB,,, | Performed by: PSYCHIATRY & NEUROLOGY

## 2017-04-13 PROCEDURE — 1159F MED LIST DOCD IN RCRD: CPT | Mod: S$GLB,,, | Performed by: PSYCHIATRY & NEUROLOGY

## 2017-04-13 PROCEDURE — 3078F DIAST BP <80 MM HG: CPT | Mod: S$GLB,,, | Performed by: PSYCHIATRY & NEUROLOGY

## 2017-04-13 PROCEDURE — 1160F RVW MEDS BY RX/DR IN RCRD: CPT | Mod: S$GLB,,, | Performed by: PSYCHIATRY & NEUROLOGY

## 2017-04-13 PROCEDURE — 99999 PR PBB SHADOW E&M-EST. PATIENT-LVL III: CPT | Mod: PBBFAC,,, | Performed by: PSYCHIATRY & NEUROLOGY

## 2017-04-13 PROCEDURE — 99205 OFFICE O/P NEW HI 60 MIN: CPT | Mod: S$GLB,,, | Performed by: PSYCHIATRY & NEUROLOGY

## 2017-04-13 PROCEDURE — 1157F ADVNC CARE PLAN IN RCRD: CPT | Mod: 8P,S$GLB,, | Performed by: PSYCHIATRY & NEUROLOGY

## 2017-04-13 NOTE — MR AVS SNAPSHOT
[facility]  [address]  [facility] [address]  Phone: [phone]                  Neptali Gonzalez   2017 2:00 PM   Office Visit    Description:  Male : 1950   Provider:  Sonia Joy MD   Department:  [facility]           Reason for Visit     Hand Weakness           Diagnoses this Visit        Comments    Adult onset focal torsion dystonia                To Do List           Future Appointments        Provider Department Dept Phone    2017 9:00 AM Herman oBggs MD [facility] - Neurology [phone]    2017 12:20 PM Yoly Liang DO [place] [phone]      Goals (5 Years of Data)     None      Follow-Up and Disposition     Return if symptoms worsen or fail to improve.    Follow-up and Disposition History      [facility]     [facility] On Call Nurse Care Line -  Assistance  Unless otherwise directed by your provider, please contact [facility], our nurse care line that is available for  assistance.     Registered nurses in the [facility] On Call Center provide: appointment scheduling, clinical advisement, health education, and other advisory services.  Call: [phone] (toll free)               Medications           Message regarding Medications     Verify the changes and/or additions to your medication regime listed below are the same as discussed with your clinician today.  If any of these changes or additions are incorrect, please notify your healthcare provider.             Verify that the below list of medications is an accurate representation of the medications you are currently taking.  If none reported, the list may be blank. If incorrect, please contact your healthcare provider. Carry this list with you in case of emergency.           Current Medications     amlodipine (NORVASC) 10 MG tablet Take 1 tablet (10 mg total) by mouth once daily.           Clinical  "Reference Information           Your Vitals Were     BP Pulse Height Weight BMI    167/78 58 5' 6" (1.676 m) 76.9 kg (169 lb 8.5 oz) 27.36 kg/m2      Blood Pressure          Most Recent Value    BP  (!)  167/78      Allergies as of 4/13/2017     No Known Allergies      Immunizations Administered on Date of Encounter - 4/13/2017     None      [facility] Sign-Up     Activating your [facility] account is as easy as 1-2-3!     1) Visit [email or web address], select Sign Up Now, enter this activation code and your date of birth, then select Next.  [email or web address]  Expires: 5/28/2017  1:59 PM      2) Create a username and password to use when you visit [facility] in the future and select a security question in case you lose your password and select Next.    3) Enter your e-mail address and click Sign Up!    Additional Information  If you have questions, please e-mail [email or web address] or call [phone] to talk to our [facility] staff. Remember, [facility] is NOT to be used for urgent needs. For medical emergencies, dial 911.         Language Assistance Services     ATTENTION: Language assistance services are available, free of charge. Please call [phone].      ATENCIÓN: Si habla español, tiene a vegas disposición servicios gratuitos de asistencia lingüística. Llame al [phone].     CHÚ Ý: N?u b?n nói Ti?ng Vi?t, có các d?ch v? h? tr? ngôn ng? mi?n phí dành cho b?n. G?i s? [phone].         [facility] complies with applicable Federal civil rights laws and does not discriminate on the basis of race, color, national origin, age, disability, or sex.        "

## 2017-04-13 NOTE — PROGRESS NOTES
Subjective:       Patient ID: Neptali Gonzalez is a 66 y.o. male.    Chief Complaint:  Hand Weakness      History of Present Illness  HPI  6 months ago, developed inability to properly  flex and extend ulnar innervated finger of right hand. Had EMG and diagnosed with CTS. Had CTS release in right palm but no improvement in right hand and  no change on EMG.    Review of Systems  Review of Systems   HENT: Negative.    Eyes: Negative.    Respiratory: Negative.    Cardiovascular: Negative.    Gastrointestinal: Negative.    Endocrine: Negative.    Genitourinary: Negative.    Musculoskeletal: Negative.    Neurological: Positive for weakness and numbness.   Hematological: Negative.    Psychiatric/Behavioral: Negative.        Objective:      Neurologic Exam     Mental Status   Oriented to person, place, and time.   Registration: recalls 3 of 3 objects. Recall at 5 minutes: recalls 3 of 3 objects. Follows 3 step commands.   Attention: normal. Concentration: normal.   Speech: speech is normal   Level of consciousness: alert  Knowledge: good.   Able to name object.     Cranial Nerves   Cranial nerves II through XII intact.     CN III, IV, VI   Pupils are equal, round, and reactive to light.  Extraocular motions are normal.     Motor Exam   Muscle bulk: normal  Right arm tone: increased  Left arm tone: normal  Right leg tone: normal  Left leg tone: normal    Strength   Strength 5/5 except as noted.   Right deltoid: 4/5  Right biceps: 3/5  Right triceps: 3/5  Right wrist flexion: 3/5  Right wrist extension: 4/5  Right interossei: 4/5       Dystonia and tremor of right hand.     Sensory Exam   Light touch normal.   Vibration normal.   Right arm pinprick: decreased from wrist    Gait, Coordination, and Reflexes     Gait  Gait: normal    Coordination   Romberg: negative  Finger to nose coordination: normal  Heel to shin coordination: normal  Tandem walking coordination: normal    Tremor   Resting tremor: absent  Intention  tremor: absent  Action tremor: absent    Reflexes   Reflexes 2+ except as noted.       Physical Exam   Constitutional: He is oriented to person, place, and time. He appears well-developed and well-nourished.   HENT:   Head: Normocephalic and atraumatic.   Eyes: EOM are normal. Pupils are equal, round, and reactive to light.   Neck: Normal range of motion. Neck supple.   Cardiovascular: Normal rate, regular rhythm and normal heart sounds.    Pulmonary/Chest: Effort normal and breath sounds normal.   Musculoskeletal: Normal range of motion.   Neurological: He is alert and oriented to person, place, and time. He has normal reflexes. He has a normal Finger-Nose-Finger Test, a normal Heel to Shin Test, a normal Romberg Test and a normal Tandem Gait Test. Gait normal.   Skin: Skin is warm and dry.   Psychiatric: He has a normal mood and affect. His speech is normal and behavior is normal. Judgment and thought content normal.         Assessment:      No diagnosis found. Patient is unable to perform      Plan:   Etiology unclear.  Need eval by movement disorder clinic. Spoke with Dr. Boggs. Patient scheduled for eval April 20th at 0900

## 2017-04-13 NOTE — LETTER
April 13, 2017      Tuan Thurston Jr., MD  [address]  [address]  [facility] [address]           [facility]  [facility]  [place] [address]  Phone: [phone]          Patient: Neptali Gonzalez   MR Number: [id number]   YOB: 1950   Date of Visit: 4/13/2017       Dear Dr. Tuan Thurston Jr.:    Thank you for referring Neptali Gonzalez to me for evaluation. Attached you will find relevant portions of my assessment and plan of care.    If you have questions, please do not hesitate to call me. I look forward to following Neptali Gonzalez along with you.    Sincerely,    Sonia Joy MD    Enclosure  CC:  No Recipients    If you would like to receive this communication electronically, please contact [email or web address] or [phone] to request more information on [employer] Link access.    For providers and/or their staff who would like to refer a patient to [facility], please contact us through our one-stop-shop provider referral line, [facility], at [phone].    If you feel you have received this communication in error or would no longer like to receive these types of communications, please e-mail [email or web address]

## 2017-04-20 ENCOUNTER — OFFICE VISIT (OUTPATIENT)
Dept: NEUROLOGY | Facility: CLINIC | Age: 67
End: 2017-04-20
Payer: MEDICARE

## 2017-04-20 VITALS
WEIGHT: 167.56 LBS | HEIGHT: 66 IN | HEART RATE: 62 BPM | DIASTOLIC BLOOD PRESSURE: 79 MMHG | SYSTOLIC BLOOD PRESSURE: 155 MMHG | BODY MASS INDEX: 26.93 KG/M2

## 2017-04-20 DIAGNOSIS — R20.0 NUMBNESS OF RIGHT HAND: ICD-10-CM

## 2017-04-20 DIAGNOSIS — R29.898 WEAKNESS OF HAND: ICD-10-CM

## 2017-04-20 DIAGNOSIS — R48.2 APRAXIA: ICD-10-CM

## 2017-04-20 DIAGNOSIS — G24.1 ADULT ONSET FOCAL TORSION DYSTONIA: Primary | ICD-10-CM

## 2017-04-20 PROCEDURE — 1159F MED LIST DOCD IN RCRD: CPT | Mod: S$GLB,,, | Performed by: PSYCHIATRY & NEUROLOGY

## 2017-04-20 PROCEDURE — 1126F AMNT PAIN NOTED NONE PRSNT: CPT | Mod: S$GLB,,, | Performed by: PSYCHIATRY & NEUROLOGY

## 2017-04-20 PROCEDURE — 99999 PR PBB SHADOW E&M-EST. PATIENT-LVL IV: CPT | Mod: PBBFAC,,, | Performed by: PSYCHIATRY & NEUROLOGY

## 2017-04-20 PROCEDURE — 99213 OFFICE O/P EST LOW 20 MIN: CPT | Mod: S$GLB,,, | Performed by: PSYCHIATRY & NEUROLOGY

## 2017-04-20 PROCEDURE — 3078F DIAST BP <80 MM HG: CPT | Mod: S$GLB,,, | Performed by: PSYCHIATRY & NEUROLOGY

## 2017-04-20 PROCEDURE — 3077F SYST BP >= 140 MM HG: CPT | Mod: S$GLB,,, | Performed by: PSYCHIATRY & NEUROLOGY

## 2017-04-20 NOTE — LETTER
April 20, 2017      Sonia Joy MD  [address]  [facility] [address]           [facility]  [address]  [facility] [address]  Phone: [phone]  Fax: [phone]          Patient: Neptali Gonzalez   MR Number: [id number]   YOB: 1950   Date of Visit: 4/20/2017       Dear Dr. Sonia Joy:    Thank you for referring Neptali Gonzalez to me for evaluation. Attached you will find relevant portions of my assessment and plan of care.    If you have questions, please do not hesitate to call me. I look forward to following Neptali Gonzalez along with you.    Sincerely,    Herman Boggs MD    Enclosure  CC:  No Recipients    If you would like to receive this communication electronically, please contact [email or web address] or [phone] to request more information on [employer] Link access.    For providers and/or their staff who would like to refer a patient to [facility], please contact us through our one-stop-shop provider referral line, [facility], at [phone].    If you feel you have received this communication in error or would no longer like to receive these types of communications, please e-mail [email or web address]

## 2017-04-20 NOTE — ASSESSMENT & PLAN NOTE
Etiology unclear.     - Obtain MRI brain with and without contrast   - consider paraneoplastic profile pending brain MRI  - Message PCP for routine cancer screenings   - aggressive OT
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
## 2017-04-20 NOTE — ASSESSMENT & PLAN NOTE
- Unclear localization: peripheral vs central.   - Obtain Brain MRI with and without contrast, if normal then consider MRI Shoulder/neck with and without contrast to examine for plexopathy with our without consideration for repeat EMG, pt has already had two.

## 2017-04-20 NOTE — MR AVS SNAPSHOT
"    [facility]  [address]  [facility] [address]  Phone: [phone]  Fax: [phone]                  Neptali Gonzalez   2017 9:00 AM   Office Visit    Description:  Male : 1950   Provider:  Herman Boggs MD   Department:  [facility]           Reason for Visit     Consult           Diagnoses this Visit        Comments    Apraxia                To Do List           Future Appointments        Provider Department Dept Phone    2017 12:20 PM Yoly Liang DO [place] [phone]      Goals (5 Years of Data)     None      [facility]     [facility] Nurse Care Line -  Assistance  Unless otherwise directed by your provider, please contact [facility], our nurse care line that is available for  assistance.     Registered nurses in the [facility] Center provide: appointment scheduling, clinical advisement, health education, and other advisory services.  Call: [phone] (toll free)               Medications           Message regarding Medications     Verify the changes and/or additions to your medication regime listed below are the same as discussed with your clinician today.  If any of these changes or additions are incorrect, please notify your healthcare provider.             Verify that the below list of medications is an accurate representation of the medications you are currently taking.  If none reported, the list may be blank. If incorrect, please contact your healthcare provider. Carry this list with you in case of emergency.           Current Medications     amlodipine (NORVASC) 10 MG tablet Take 1 tablet (10 mg total) by mouth once daily.           Clinical Reference Information           Your Vitals Were     BP Pulse Height Weight BMI    155/79 62 5' 6" (1.676 m) 76 kg (167 lb 8.8 oz) 27.04 kg/m2      Blood Pressure          Most Recent Value    BP  (!)  155/79      Allergies as of " 4/20/2017     No Known Allergies      Immunizations Administered on Date of Encounter - 4/20/2017     None      Orders Placed During Today's Visit      Normal Orders This Visit    Ambulatory consult to Occupational Therapy     Future Labs/Procedures Expected by Expires    Basic metabolic panel  4/20/2017 6/19/2018    MRI Brain W WO Contrast  4/20/2017 4/20/2018      [facility] Sign-Up     Activating your [facility] account is as easy as 1-2-3!     1) Visit [email or web address], select Sign Up Now, enter this activation code and your date of birth, then select Next.  [email or web address]  Expires: 5/28/2017  1:59 PM      2) Create a username and password to use when you visit [facility] in the future and select a security question in case you lose your password and select Next.    3) Enter your e-mail address and click Sign Up!    Additional Information  If you have questions, please e-mail [email or web address] or call [phone] to talk to our [facility] staff. Remember, [facility] is NOT to be used for urgent needs. For medical emergencies, dial 911.         Instructions    1) Go to the second floor today to have your labs drawn     2) Someone will be calling you from occupational therapy     3) Please have your routine screening checks done: Prostate, colonoscopy - I will message Dr. Davenport about these    4) Someone will be coming in to schedule your MRI        Language Assistance Services     ATTENTION: Language assistance services are available, free of charge. Please call [phone].      ATENCIÓN: Si habla margy, tiene a vegas disposición servicios gratuitos de asistencia lingüística. Llame al [phone].     [facility] Ý: N?u b?n nói Ti?ng Vi?t, có các d?ch v? h? tr? ngôn ng? mi?n phí dành cho b?n. G?i s? [phone].         [facility] complies with applicable Federal civil rights laws and does not discriminate on the basis of race, color, national origin, age, disability, or sex.

## 2017-04-20 NOTE — LETTER
April 20, 2017      Dr. Jarett Davenport IV  [address] [place], [address]               [facility]  [address]  [place] [address]  Phone: [phone]  Fax: [phone]   Patient: Neptali Gonzalez   MR Number: [id number]   YOB: 1950   Date of Visit: 4/20/2017       Dear Dr. Davenport,     I saw your patient, Neptali Gonzalez, today for evaluation. Attached you will find relevant portions of my assessment and plan of care. He has an adult onset focal dystonia as well as some apraxia of his right hand. Because of this, he needs a full paraneoplastic workup. I am beginning my assessment with an MRI of his brain with and without contrast however he also needs his routine colonoscopy and prostate check to examine for any signs of malignancy. I am writing to help facilitate his care, Mr. Gonzalez states that he is in agreement with having these tests completed. If your office would contact him to have him scheduled for a follow up and accession of these recommended screenings, we would be very appreciative.        If you have questions, please do not hesitate to call me. I look forward to following Neptali Gonzalez along with you.    Sincerely,        Herman Boggs MD      CC  No Recipients    Enclosure

## 2017-04-20 NOTE — PATIENT INSTRUCTIONS
1) Go to the second floor today to have your labs drawn     2) Someone will be calling you from occupational therapy     3) Please have your routine screening checks done: Prostate, colonoscopy - I will message Dr. Davenport about these    4) Someone will be coming in to schedule your MRI

## 2017-04-20 NOTE — ASSESSMENT & PLAN NOTE
Pt has not yet had routine cancer screening, prostate check or colonoscopy    - Obtain MRI brain with and without contrast   - Aggressive OT   - CD/LD trial for 3-4 days to see if this helps  - Consider paraneoplastic panel pending brain MRI   - Message primary care doctor to try and assist routine cancer screening test scheduling   - consider peripheral studies (MRI UE with and without contrast and possibly repeat EMG pending MRI brain results)

## 2017-04-21 RX ORDER — CARBIDOPA AND LEVODOPA 25; 100 MG/1; MG/1
TABLET ORAL
Qty: 8 TABLET | Refills: 0 | Status: SHIPPED | OUTPATIENT
Start: 2017-04-21 | End: 2017-05-23 | Stop reason: ALTCHOICE

## 2017-05-01 ENCOUNTER — TELEPHONE (OUTPATIENT)
Dept: NEUROLOGY | Facility: CLINIC | Age: 67
End: 2017-05-01

## 2017-05-01 ENCOUNTER — HOSPITAL ENCOUNTER (OUTPATIENT)
Dept: RADIOLOGY | Facility: HOSPITAL | Age: 67
Discharge: HOME | End: 2017-05-01
Attending: PSYCHIATRY & NEUROLOGY
Payer: MEDICARE

## 2017-05-01 DIAGNOSIS — I69.351 HEMIPLEGIA AND HEMIPARESIS FOLLOWING CEREBRAL INFARCTION AFFECTING RIGHT DOMINANT SIDE: ICD-10-CM

## 2017-05-01 DIAGNOSIS — R48.2 APRAXIA: ICD-10-CM

## 2017-05-01 DIAGNOSIS — R79.9 ABNORMAL FINDING OF BLOOD CHEMISTRY: ICD-10-CM

## 2017-05-01 PROCEDURE — 70553 MRI BRAIN STEM W/O & W/DYE: CPT | Mod: 26,,, | Performed by: RADIOLOGY

## 2017-05-01 PROCEDURE — 70553 MRI BRAIN STEM W/O & W/DYE: CPT | Mod: TC

## 2017-05-01 PROCEDURE — 25500020 PHARM REV CODE 255: Performed by: PSYCHIATRY & NEUROLOGY

## 2017-05-01 PROCEDURE — A9585 GADOBUTROL INJECTION: HCPCS | Performed by: PSYCHIATRY & NEUROLOGY

## 2017-05-01 RX ORDER — ATORVASTATIN CALCIUM 40 MG/1
40 TABLET, FILM COATED ORAL DAILY
Qty: 90 TABLET | Refills: 11 | Status: SHIPPED | OUTPATIENT
Start: 2017-05-01 | End: 2018-06-25 | Stop reason: SDUPTHER

## 2017-05-01 RX ORDER — GADOBUTROL 604.72 MG/ML
7 INJECTION INTRAVENOUS
Status: COMPLETED | OUTPATIENT
Start: 2017-05-01 | End: 2017-05-01

## 2017-05-01 RX ORDER — GADOBUTROL 604.72 MG/ML
INJECTION INTRAVENOUS
Status: DISPENSED
Start: 2017-05-01 | End: 2017-05-02

## 2017-05-01 RX ADMIN — GADOBUTROL 7 ML: 604.72 INJECTION INTRAVENOUS at 01:05

## 2017-05-01 NOTE — TELEPHONE ENCOUNTER
Called Mr. Gonzalez to give MRI brain results - He was not aware he had had an old stroke.     Had stent placed in his leg this was in 2007 - blood pressure was pretty high so he went to hospital but no acute symptoms at that time.      Can only recall having right sided weakness associated with the carpal tunnel.     Suspect had L watershed stroke presenting as gradual onset right hand weakness.     MRI Brain reviewed with and without contrast, large L hemisphere stroke, including L caudate. Evidence of LICA territory watershed infarct. Needs vessel imaging, ASA, statin, and possibly ACE I     - Have instructed him to start ASA 81 mg daily and Atorvastatin   - Stroke education given including need to call 911 if any new symptoms concerning for stroke   - Will assess need for ACE-I next visit   - CD/LD - did not help will discontinue  - Check fasting lipid profile, Hb A1C                      L Caudate infarction

## 2017-05-02 ENCOUNTER — CLINICAL SUPPORT (OUTPATIENT)
Dept: REHABILITATION | Facility: HOSPITAL | Age: 67
End: 2017-05-02
Attending: FAMILY MEDICINE
Payer: MEDICARE

## 2017-05-02 DIAGNOSIS — M25.611 STIFFNESS OF SHOULDER JOINT, RIGHT: ICD-10-CM

## 2017-05-02 DIAGNOSIS — R68.89 IMPAIRED FUNCTION OF UPPER EXTREMITY: Primary | ICD-10-CM

## 2017-05-02 DIAGNOSIS — R27.8 LOSS OF COORDINATION: ICD-10-CM

## 2017-05-02 DIAGNOSIS — R29.898 DECREASED GRIP STRENGTH OF RIGHT HAND: ICD-10-CM

## 2017-05-02 PROCEDURE — 97166 OT EVAL MOD COMPLEX 45 MIN: CPT | Mod: PN

## 2017-05-02 PROCEDURE — G8985 CARRY GOAL STATUS: HCPCS | Mod: CJ,PN

## 2017-05-02 PROCEDURE — G8984 CARRY CURRENT STATUS: HCPCS | Mod: CK,PN

## 2017-05-04 ENCOUNTER — TELEPHONE (OUTPATIENT)
Dept: RADIOLOGY | Facility: HOSPITAL | Age: 67
End: 2017-05-04

## 2017-05-05 ENCOUNTER — OFFICE VISIT (OUTPATIENT)
Dept: NEUROLOGY | Facility: CLINIC | Age: 67
End: 2017-05-05
Payer: MEDICARE

## 2017-05-05 ENCOUNTER — HOSPITAL ENCOUNTER (OUTPATIENT)
Dept: RADIOLOGY | Facility: HOSPITAL | Age: 67
Discharge: HOME | End: 2017-05-05
Attending: PSYCHIATRY & NEUROLOGY
Payer: MEDICARE

## 2017-05-05 VITALS
SYSTOLIC BLOOD PRESSURE: 136 MMHG | DIASTOLIC BLOOD PRESSURE: 82 MMHG | BODY MASS INDEX: 26.96 KG/M2 | WEIGHT: 167.75 LBS | HEIGHT: 66 IN

## 2017-05-05 DIAGNOSIS — E11.8 TYPE 2 DIABETES MELLITUS WITH COMPLICATION, WITHOUT LONG-TERM CURRENT USE OF INSULIN: ICD-10-CM

## 2017-05-05 DIAGNOSIS — E11.69 DYSLIPIDEMIA WITH LOW HIGH DENSITY LIPOPROTEIN (HDL) CHOLESTEROL WITH HYPERTRIGLYCERIDEMIA DUE TO TYPE 2 DIABETES MELLITUS: ICD-10-CM

## 2017-05-05 DIAGNOSIS — I69.351 HEMIPLEGIA AND HEMIPARESIS FOLLOWING CEREBRAL INFARCTION AFFECTING RIGHT DOMINANT SIDE: ICD-10-CM

## 2017-05-05 DIAGNOSIS — R20.0 NUMBNESS OF RIGHT HAND: ICD-10-CM

## 2017-05-05 DIAGNOSIS — E78.2 DYSLIPIDEMIA WITH LOW HIGH DENSITY LIPOPROTEIN (HDL) CHOLESTEROL WITH HYPERTRIGLYCERIDEMIA DUE TO TYPE 2 DIABETES MELLITUS: ICD-10-CM

## 2017-05-05 DIAGNOSIS — R48.2 APRAXIA: ICD-10-CM

## 2017-05-05 DIAGNOSIS — I69.351 HEMIPLEGIA AND HEMIPARESIS FOLLOWING CEREBRAL INFARCTION AFFECTING RIGHT DOMINANT SIDE: Primary | ICD-10-CM

## 2017-05-05 PROCEDURE — 25500020 PHARM REV CODE 255: Performed by: PSYCHIATRY & NEUROLOGY

## 2017-05-05 PROCEDURE — 70496 CT ANGIOGRAPHY HEAD: CPT | Mod: 26,,, | Performed by: RADIOLOGY

## 2017-05-05 PROCEDURE — 99213 OFFICE O/P EST LOW 20 MIN: CPT | Mod: S$GLB,,, | Performed by: PSYCHIATRY & NEUROLOGY

## 2017-05-05 PROCEDURE — 99999 PR PBB SHADOW E&M-EST. PATIENT-LVL III: CPT | Mod: 25,PBBFAC,, | Performed by: PSYCHIATRY & NEUROLOGY

## 2017-05-05 PROCEDURE — 70498 CT ANGIOGRAPHY NECK: CPT | Mod: 26,,, | Performed by: RADIOLOGY

## 2017-05-05 PROCEDURE — 70496 CT ANGIOGRAPHY HEAD: CPT | Mod: TC

## 2017-05-05 PROCEDURE — 1126F AMNT PAIN NOTED NONE PRSNT: CPT | Mod: S$GLB,,, | Performed by: PSYCHIATRY & NEUROLOGY

## 2017-05-05 PROCEDURE — 1159F MED LIST DOCD IN RCRD: CPT | Mod: S$GLB,,, | Performed by: PSYCHIATRY & NEUROLOGY

## 2017-05-05 PROCEDURE — 70498 CT ANGIOGRAPHY NECK: CPT | Mod: TC

## 2017-05-05 PROCEDURE — 3079F DIAST BP 80-89 MM HG: CPT | Mod: S$GLB,,, | Performed by: PSYCHIATRY & NEUROLOGY

## 2017-05-05 PROCEDURE — 3075F SYST BP GE 130 - 139MM HG: CPT | Mod: S$GLB,,, | Performed by: PSYCHIATRY & NEUROLOGY

## 2017-05-05 PROCEDURE — 1160F RVW MEDS BY RX/DR IN RCRD: CPT | Mod: S$GLB,,, | Performed by: PSYCHIATRY & NEUROLOGY

## 2017-05-05 RX ORDER — ASPIRIN 81 MG/1
81 TABLET ORAL DAILY
COMMUNITY
End: 2019-12-09 | Stop reason: SDUPTHER

## 2017-05-05 RX ADMIN — IOHEXOL 100 ML: 350 INJECTION, SOLUTION INTRAVENOUS at 05:05
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
## 2017-05-05 NOTE — ASSESSMENT & PLAN NOTE
Have contacted his PCP Dr. Davenport's office to notify them of HbA1C 9.0 and dyslipidemia. Will defer management to them.

## 2017-05-05 NOTE — PATIENT INSTRUCTIONS
1) Continue Aspirin daily 81 mg   2) Continue atorvastatin daily 40mg     I will call you with the results of your upcoming tests    If you experience any stroke like symptoms, call 911 immediately.

## 2017-05-05 NOTE — LETTER
May 5, 2017      Jarett Davenport IV, MD  [address] [facility] [address] [address]   [place]  [place] [address]           [facility]  [address]  [place] [address]  Phone: [phone]  Fax: [phone]          Patient: Neptali Gonzalez   MR Number: [id number]   YOB: 1950   Date of Visit: 5/5/2017       Dear Dr. Jarett Davenport IV:    Thank you for referring Neptali Gonzalez to me for evaluation. Attached you will find relevant portions of my assessment and plan of care.    If you have questions, please do not hesitate to call me. I look forward to following Neptali Gonzalez along with you.    Sincerely,    Herman Boggs MD    Enclosure  CC:  No Recipients    If you would like to receive this communication electronically, please contact [email or web address] or [phone] to request more information on [employer] access.    For providers and/or their staff who would like to refer a patient to [facility], please contact us through our one-stop-shop provider referral line, [facility], at [phone].    If you feel you have received this communication in error or would no longer like to receive these types of communications, please e-mail [email or web address]

## 2017-05-05 NOTE — MR AVS SNAPSHOT
[facility]  [address]  [facility] [address]  Phone: [phone]  Fax: [phone]                  Neptali Gonzalez   2017 8:30 AM   Office Visit    Description:  Male : 1950   Provider:  Herman Boggs MD   Department:  [facility]           Reason for Visit     Follow-up           Diagnoses this Visit        Comments    Hemiplegia and hemiparesis following cerebral infarction affecting right dominant side    -  Primary     Apraxia         Numbness of right hand                To Do List           Future Appointments        Provider Department Dept Phone    2017 3:45 PM [facility] CT2 64- LIMIT 600 LBS [facility] [phone]    2017 3:15 PM ECHO, [facility] Echo[facility]Stress Lab [phone]    2017 9:00 AM Krystle Holloway OT [facility] [phone]    2017 12:20 PM Yoly Liang [facility]Re[facility] [phone]      Goals (5 Years of Data)     None      [facility] On Call     [facility] Nurse Care Line -  Assistance  Unless otherwise directed by your provider, please contact [facility], our nurse care line that is available for  assistance.     Registered nurses in the [facility] provide: appointment scheduling, clinical advisement, health education, and other advisory services.  Call: [phone] (toll free)               Medications           Message regarding Medications     Verify the changes and/or additions to your medication regime listed below are the same as discussed with your clinician today.  If any of these changes or additions are incorrect, please notify your healthcare provider.             Verify that the below list of medications is an accurate representation of the medications you are currently taking.  If none reported, the list may be blank. If incorrect, please contact your healthcare provider. Carry this list  "with you in case of emergency.           Current Medications     amlodipine (NORVASC) 10 MG tablet Take 1 tablet (10 mg total) by mouth once daily.    atorvastatin (LIPITOR) 40 MG tablet Take 1 tablet (40 mg total) by mouth once daily.    carbidopa-levodopa  mg (SINEMET)  mg per tablet Take one tablet in the morning and one tablet in the afternoon to see if this helps your hand.           Clinical Reference Information           Your Vitals Were     BP Height Weight BMI       136/82 (BP Location: Right arm, Patient Position: Sitting, BP Method: Manual) 5' 6" (1.676 m) 76.1 kg (167 lb 12.3 oz) 27.08 kg/m2       Blood Pressure          Most Recent Value    BP  136/82      Allergies as of 5/5/2017     No Known Allergies      Immunizations Administered on Date of Encounter - 5/5/2017     None      [facility] Sign-Up     Activating your [facility] account is as easy as 1-2-3!     1) Visit [email or web address], select Sign Up Now, enter this activation code and your date of birth, then select Next.  [email or web address]  Expires: 5/28/2017  1:59 PM      2) Create a username and password to use when you visit [facility] in the future and select a security question in case you lose your password and select Next.    3) Enter your e-mail address and click Sign Up!    Additional Information  If you have questions, please e-mail [email or web address] or call [phone] to talk to our [facility] staff. Remember, [facility] is NOT to be used for urgent needs. For medical emergencies, dial 911.         Instructions    1) Continue Aspirin daily 81 mg   2) Continue atorvastatin daily 40mg     I will call you with the results of your upcoming tests    If you experience any stroke like symptoms, call 911 immediately.            Language Assistance Services     ATTENTION: Language assistance services are available, free of charge. Please call [phone].      ATENCIÓN: Si habla español, tiene a vegas disposición servicios gratuitos de " asistencia lingüística. Ruperto al [phone].     TOSHIA Ý: N?u b?n nói Ti?ng Vi?t, có các d?ch v? h? tr? ngôn ng? mi?n phí dành cho b?n. G?i s? [phone].         [facility] complies with applicable Federal civil rights laws and does not discriminate on the basis of race, color, national origin, age, disability, or sex.

## 2017-05-05 NOTE — ASSESSMENT & PLAN NOTE
- Aggressive OT  - Stroke prevention as under problem hemiplegia and paresis following cerebral infarction
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
## 2017-05-05 NOTE — ASSESSMENT & PLAN NOTE
- Aggressive OT  - Started ASA, statin  - BP parameters pending CTA, continue home amlodipine for now  - CTA head and neck pending  - Lipid profile and A1C pending  - 2D echo pending

## 2017-05-05 NOTE — PROGRESS NOTES
Occupational Therapy   Evaluation    Neptali Gonzalez   MRN: [id number]     OT eval complete. Please see attached POC for details. Thank you for consult!    ALEXANDRE Rojo LOTR  5/2/2017

## 2017-05-08 ENCOUNTER — HOSPITAL ENCOUNTER (OUTPATIENT)
Dept: CARDIOLOGY | Facility: CLINIC | Age: 67
Discharge: HOME | End: 2017-05-08
Payer: MEDICARE

## 2017-05-08 DIAGNOSIS — I69.351 HEMIPLEGIA AND HEMIPARESIS FOLLOWING CEREBRAL INFARCTION AFFECTING RIGHT DOMINANT SIDE: ICD-10-CM

## 2017-05-08 LAB
DIASTOLIC DYSFUNCTION: YES
ESTIMATED PA SYSTOLIC PRESSURE: 21.32
MITRAL VALVE MOBILITY: NORMAL
RETIRED EF AND QEF - SEE NOTES: 60 (ref 55–65)

## 2017-05-08 PROCEDURE — 93306 TTE W/DOPPLER COMPLETE: CPT | Mod: S$GLB,,, | Performed by: INTERNAL MEDICINE

## 2017-05-08 NOTE — PROGRESS NOTES
22 g sl started in right forearm for bubble study. Bubble study done x2 with and without valsalva. Tolerated well. Sl d/lina after. Pressure applied.

## 2017-05-09 ENCOUNTER — TELEPHONE (OUTPATIENT)
Dept: NEUROLOGY | Facility: CLINIC | Age: 67
End: 2017-05-09

## 2017-05-09 ENCOUNTER — CLINICAL SUPPORT (OUTPATIENT)
Dept: REHABILITATION | Facility: HOSPITAL | Age: 67
End: 2017-05-09
Attending: FAMILY MEDICINE
Payer: MEDICARE

## 2017-05-09 DIAGNOSIS — R68.89 IMPAIRED FUNCTION OF UPPER EXTREMITY: Primary | ICD-10-CM

## 2017-05-09 DIAGNOSIS — R29.898 DECREASED GRIP STRENGTH OF RIGHT HAND: ICD-10-CM

## 2017-05-09 DIAGNOSIS — I65.23 BILATERAL CAROTID ARTERY STENOSIS: ICD-10-CM

## 2017-05-09 DIAGNOSIS — M25.611 STIFFNESS OF SHOULDER JOINT, RIGHT: ICD-10-CM

## 2017-05-09 DIAGNOSIS — R27.8 LOSS OF COORDINATION: ICD-10-CM

## 2017-05-09 PROCEDURE — 97110 THERAPEUTIC EXERCISES: CPT | Mod: PN

## 2017-05-09 PROCEDURE — 97112 NEUROMUSCULAR REEDUCATION: CPT | Mod: PN

## 2017-05-09 PROCEDURE — 97140 MANUAL THERAPY 1/> REGIONS: CPT | Mod: PN

## 2017-05-09 NOTE — TELEPHONE ENCOUNTER
Results of ECHO, CTA head and neck reviewed with Mr. Gonzalez, grade I diastolic dysfunction and bilateral ICA stenosis, left estimated at 50%. Needs carotid ultrasound.     He states he has started occupational therapy and plans to continue with that for both his hand and his shoulder.

## 2017-05-09 NOTE — PROGRESS NOTES
Occupational Therapy    Date:  05/09/2017  Start Time:  0905  Stop Time:  1000    TIMED  Procedure Time Min.   Manual (2) Start:  Stop: 30   Neuro Re-ed (1) Start:  Stop: 15   TherEx (1) Start:  Stop: 10   Total Timed Minutes:  55  Total Timed Units:  4  Total Untimed Units:  0  Charges Billed/# of units:  4    Progress/Current Status    Subjective:     Patient ID: Neptali Gonzalez is a 66 y.o. male.  Diagnosis:   1. Impaired function of upper extremity     2. Stiffness of shoulder joint, right     3. Decreased  strength of right hand     4. Loss of coordination       Pain: 0 /10  CTA on Friday, Echo yesterday to determine cause of CVA. No results available as yet per pt.  MD did say that pt has DM and needs to get in touch with primary for mgt of this.      Objective:     Facilitation of and strengthening in ant/post pelvic tilt/ R & L lateral tilt w/ tilt board  Mobilization/stretching of shldr girdle/ anterior shldr/lats/ combined shldr internal rotators  elsa scap retraction AROM 2x10.  Mobilization of carpals, metacarpals, radius/ulna to improve ROM in hand, wrist and forearm  During pelvic strengthening, worked on scapulo-pelvic rhythm to improve movement in R UE.  Stretch to ER@0*abd, 10x30s.    Assessment:     Pt w/ slight improvement in pelvic movement after facilitation. Overall movement in arm limited by stiffness as well as apraxia. Pt can continue to benefit from skilled OT to improve R UE function.    Patient Education/Response:     ER@0*abd stretches - written HEP given & pt returned demo as noted above.    Plans and Goals:     Review stretch. Continue mobilization of R shldr.    Krystle Holloway, ALEXANDRE, LOTR  5/9/2017

## 2017-05-10 NOTE — TELEPHONE ENCOUNTER
Called patient and scheduled Carotid  Ultrasound for 5/15 11am. Patient verbally expressed understanding

## 2017-05-12 ENCOUNTER — TELEPHONE (OUTPATIENT)
Dept: PHYSICAL MEDICINE AND REHAB | Facility: CLINIC | Age: 67
End: 2017-05-12

## 2017-05-12 NOTE — TELEPHONE ENCOUNTER
----- Message from Qi Warner sent at 5/12/2017  2:51 PM CDT -----  Patient requesting to speak with nurse concerning upcoming procedure/please call back at [phone].

## 2017-05-15 ENCOUNTER — CLINICAL SUPPORT (OUTPATIENT)
Dept: CARDIOLOGY | Facility: CLINIC | Age: 67
End: 2017-05-15
Payer: MEDICARE

## 2017-05-15 DIAGNOSIS — I65.23 BILATERAL CAROTID ARTERY STENOSIS: ICD-10-CM

## 2017-05-15 LAB — INTERNAL CAROTID STENOSIS: ABNORMAL

## 2017-05-15 PROCEDURE — 93880 EXTRACRANIAL BILAT STUDY: CPT | Mod: S$GLB,,, | Performed by: INTERNAL MEDICINE

## 2017-05-17 ENCOUNTER — TELEPHONE (OUTPATIENT)
Dept: NEUROLOGY | Facility: CLINIC | Age: 67
End: 2017-05-17

## 2017-05-17 ENCOUNTER — CLINICAL SUPPORT (OUTPATIENT)
Dept: REHABILITATION | Facility: HOSPITAL | Age: 67
End: 2017-05-17
Attending: FAMILY MEDICINE
Payer: MEDICARE

## 2017-05-17 DIAGNOSIS — R27.8 LOSS OF COORDINATION: ICD-10-CM

## 2017-05-17 DIAGNOSIS — R68.89 IMPAIRED FUNCTION OF UPPER EXTREMITY: Primary | ICD-10-CM

## 2017-05-17 DIAGNOSIS — R29.898 DECREASED GRIP STRENGTH OF RIGHT HAND: ICD-10-CM

## 2017-05-17 DIAGNOSIS — M25.611 STIFFNESS OF SHOULDER JOINT, RIGHT: ICD-10-CM

## 2017-05-17 PROCEDURE — 97140 MANUAL THERAPY 1/> REGIONS: CPT | Mod: PN

## 2017-05-17 PROCEDURE — 97110 THERAPEUTIC EXERCISES: CPT | Mod: PN

## 2017-05-17 PROCEDURE — 97112 NEUROMUSCULAR REEDUCATION: CPT | Mod: PN

## 2017-05-17 NOTE — TELEPHONE ENCOUNTER
----- Message from Gin Dempsey sent at 5/17/2017  1:29 PM CDT -----  Contact: Pt  Pt is returning call for test results and can be reached at [phone].    Thank you

## 2017-05-17 NOTE — TELEPHONE ENCOUNTER
----- Message from Daria Gongora sent at 5/17/2017  4:15 PM CDT -----  Contact: self @ [phone]  Pt says he is returning dr jiang's call.
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
## 2017-05-17 NOTE — TELEPHONE ENCOUNTER
Attempted to review results of carotid US with Mr. Gonzalez, no answer on cell phone. Message left.

## 2017-05-18 NOTE — TELEPHONE ENCOUNTER
Reviewed Carotid US with Mr. Gonzalez. No clinically significant stenosis but stenosis present both ICA's R>L. Continue medical management and start treatment for his diabetes. He is trying to get re-established with his PCP but has been unsuccessful so far. Have instructed him to please call me back if he is unable to reach his PCP so that I can help [facility].

## 2017-05-22 ENCOUNTER — TELEPHONE (OUTPATIENT)
Dept: NEUROLOGY | Facility: CLINIC | Age: 67
End: 2017-05-22

## 2017-05-22 NOTE — TELEPHONE ENCOUNTER
----- Message from Daria Gongora sent at 5/19/2017  2:58 PM CDT -----  Contact: self @ [phone]  Pt says dr jiang told him to call if he had a problem scheduling an appt with her pcp.  Pt says the pcp will not be able to see him until 6-26-17.  pls call to discuss options.

## 2017-05-22 NOTE — TELEPHONE ENCOUNTER
Returned call patient to given information and he states they can not work him in sooner than the appointment below. Give number to Primary Care to see if he can get a sooner appointment.  Transfer to department.

## 2017-05-23 ENCOUNTER — OFFICE VISIT (OUTPATIENT)
Dept: INTERNAL MEDICINE | Facility: CLINIC | Age: 67
End: 2017-05-23
Payer: MEDICARE

## 2017-05-23 ENCOUNTER — PROCEDURE VISIT (OUTPATIENT)
Dept: OPTOMETRY | Facility: CLINIC | Age: 67
End: 2017-05-23
Attending: INTERNAL MEDICINE
Payer: MEDICARE

## 2017-05-23 VITALS
HEART RATE: 66 BPM | BODY MASS INDEX: 26.5 KG/M2 | WEIGHT: 164.88 LBS | DIASTOLIC BLOOD PRESSURE: 72 MMHG | SYSTOLIC BLOOD PRESSURE: 140 MMHG | OXYGEN SATURATION: 96 % | HEIGHT: 66 IN

## 2017-05-23 DIAGNOSIS — Z11.4 ENCOUNTER FOR SCREENING FOR HIV: ICD-10-CM

## 2017-05-23 DIAGNOSIS — E11.8 TYPE 2 DIABETES MELLITUS WITH COMPLICATION, WITHOUT LONG-TERM CURRENT USE OF INSULIN: ICD-10-CM

## 2017-05-23 DIAGNOSIS — I69.351 HEMIPLEGIA AND HEMIPARESIS FOLLOWING CEREBRAL INFARCTION AFFECTING RIGHT DOMINANT SIDE: ICD-10-CM

## 2017-05-23 DIAGNOSIS — E11.69 DYSLIPIDEMIA WITH LOW HIGH DENSITY LIPOPROTEIN (HDL) CHOLESTEROL WITH HYPERTRIGLYCERIDEMIA DUE TO TYPE 2 DIABETES MELLITUS: ICD-10-CM

## 2017-05-23 DIAGNOSIS — D69.2 SENILE PURPURA: ICD-10-CM

## 2017-05-23 DIAGNOSIS — E78.2 DYSLIPIDEMIA WITH LOW HIGH DENSITY LIPOPROTEIN (HDL) CHOLESTEROL WITH HYPERTRIGLYCERIDEMIA DUE TO TYPE 2 DIABETES MELLITUS: ICD-10-CM

## 2017-05-23 DIAGNOSIS — I70.0 AORTIC ARCH ATHEROSCLEROSIS: ICD-10-CM

## 2017-05-23 DIAGNOSIS — M47.812 CERVICAL ARTHRITIS: ICD-10-CM

## 2017-05-23 LAB
CREAT UR-MCNC: 214 MG/DL
MICROALBUMIN UR DL<=1MG/L-MCNC: 44 UG/ML
MICROALBUMIN/CREATININE RATIO: 20.6 UG/MG

## 2017-05-23 PROCEDURE — 99999 PR PBB SHADOW E&M-EST. PATIENT-LVL III: CPT | Mod: PBBFAC,,, | Performed by: INTERNAL MEDICINE

## 2017-05-23 PROCEDURE — 3045F PR MOST RECENT HEMOGLOBIN A1C LEVEL 7.0-9.0%: CPT | Mod: S$GLB,,, | Performed by: INTERNAL MEDICINE

## 2017-05-23 PROCEDURE — 1160F RVW MEDS BY RX/DR IN RCRD: CPT | Mod: S$GLB,,, | Performed by: INTERNAL MEDICINE

## 2017-05-23 PROCEDURE — 4010F ACE/ARB THERAPY RXD/TAKEN: CPT | Mod: S$GLB,,, | Performed by: INTERNAL MEDICINE

## 2017-05-23 PROCEDURE — 3077F SYST BP >= 140 MM HG: CPT | Mod: S$GLB,,, | Performed by: INTERNAL MEDICINE

## 2017-05-23 PROCEDURE — 1126F AMNT PAIN NOTED NONE PRSNT: CPT | Mod: S$GLB,,, | Performed by: INTERNAL MEDICINE

## 2017-05-23 PROCEDURE — 1159F MED LIST DOCD IN RCRD: CPT | Mod: S$GLB,,, | Performed by: INTERNAL MEDICINE

## 2017-05-23 PROCEDURE — 99205 OFFICE O/P NEW HI 60 MIN: CPT | Mod: S$GLB,,, | Performed by: INTERNAL MEDICINE

## 2017-05-23 PROCEDURE — 92227 IMG RTA DETCJ/MNTR DS STAFF: CPT | Mod: S$GLB,,, | Performed by: OPHTHALMOLOGY

## 2017-05-23 PROCEDURE — 3078F DIAST BP <80 MM HG: CPT | Mod: S$GLB,,, | Performed by: INTERNAL MEDICINE

## 2017-05-23 RX ORDER — METFORMIN HYDROCHLORIDE 500 MG/1
500 TABLET, EXTENDED RELEASE ORAL
Qty: 90 TABLET | Refills: 3 | Status: SHIPPED | OUTPATIENT
Start: 2017-05-23 | End: 2018-06-25 | Stop reason: SDUPTHER

## 2017-05-23 RX ORDER — LISINOPRIL 5 MG/1
5 TABLET ORAL DAILY
Qty: 90 TABLET | Refills: 3 | Status: SHIPPED | OUTPATIENT
Start: 2017-05-23 | End: 2018-01-30 | Stop reason: DRUGHIGH

## 2017-05-23 NOTE — PROGRESS NOTES
Primary Care Provider Appointment    Subjective:      Patient ID: Neptali Gonzalez is a 66 y.o. male with newly diagnosed diabetes.     Chief Complaint: Establish Care (PT is here for over all check up on his health well being); Diabetes (Pt would like to talk about his diabetes ); and Numbness (Rt arm , Pt is currently in PT , PT stated that this is due to stoke)    Previous patient of Dr Jarett Davenport, but patient wants more access to his PCP. He was recently diagnosed with DM, but could not get in to see his PCP for 2 months. His A1c was 9 on 5/5/17.     He was admitted on 1/18/17 for R hand weakness that was determined to be from cervical spine stenosis. Also admitted in 8/2016 for carpal tunnel surgery.    He also had a stroke in 2007 at [facility] with residual weakness and apraxia of R hand. He has large chronic L hemisphere infarction on MRI. Seen by Neuro on 5/5/17 with numerous imaging studies of head and neck ordered. He was started on ASA and statin by Neuro.     He was started on sinemet by Neuro, with no improvement in Parkinson-like symptoms. This med was discontinued by Neuro recently.    BP uncontrolled today, as patient did not take his BP med before leaving home today.    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    Social History  Components    Tobacco (-) quit 50 years ago, 3 pack year history    Alcohol (+) rare red wine, no remote overuse     Ililcit drugs (-) remote daily use of cocaine/marijuana (last use 2007),     Sex (+) , female partners    Employment (+)         Past Surgical History:   Procedure Laterality Date    CARDIAC SURGERY      CORONARY STENT PLACEMENT      LITHOTRIPSY      TONSILLECTOMY         Past Medical History:   Diagnosis Date     "Coronary artery disease 2002    stent    Hypertension     Kidney stones     MI (myocardial infarction)        Review of Systems   Constitutional: Negative for activity change, appetite change, fatigue and fever.   Respiratory: Negative for cough and shortness of breath.    Cardiovascular: Negative for chest pain and leg swelling.   Gastrointestinal: Negative for abdominal pain, constipation and diarrhea.   Musculoskeletal: Positive for joint swelling, neck pain and neck stiffness. Negative for back pain.   Skin: Negative for color change.   Neurological: Positive for tremors, weakness and numbness. Negative for dizziness, seizures and speech difficulty.   Hematological: Negative for adenopathy. Bruises/bleeds easily.   Psychiatric/Behavioral: Negative for agitation, confusion and decreased concentration. The patient is not nervous/anxious.        Objective:   BP (!) 140/72 (BP Location: Left arm, Patient Position: Sitting, BP Method: Manual)   Pulse 66   Ht 5' 6" (1.676 m)   Wt 74.8 kg (164 lb 14.5 oz)   SpO2 96%   BMI 26.62 kg/m²     Physical Exam   Constitutional: He is oriented to person, place, and time. He appears well-developed and well-nourished.   HENT:   Head: Normocephalic and atraumatic.   Mouth/Throat: Oropharynx is clear and moist.   L ear impacted cerumen  R ear normal  edentulous   Eyes: Conjunctivae and EOM are normal. Pupils are equal, round, and reactive to light.   Neck: Normal range of motion.   Cardiovascular: Normal rate, regular rhythm, normal heart sounds and intact distal pulses.    Pulses:       Dorsalis pedis pulses are 3+ on the right side, and 3+ on the left side.        Posterior tibial pulses are 3+ on the right side, and 3+ on the left side.   Pulmonary/Chest: Effort normal and breath sounds normal.   Abdominal: Soft. Bowel sounds are normal.   Musculoskeletal: Normal range of motion. He exhibits deformity.        Right foot: There is deformity. There is normal range of " motion.        Left foot: There is deformity. There is normal range of motion.   R hand strength  3/5  Mild R arm tremor with intention  Contractures R hand  Decreased sensation in R arm  Long toenails bilaterally  Winged R scapula   Feet:   Right Foot:   Protective Sensation: 10 sites tested. 10 sites sensed.   Skin Integrity: Positive for dry skin. Negative for ulcer, blister or skin breakdown.   Left Foot:   Protective Sensation: 10 sites tested. 10 sites sensed.   Skin Integrity: Positive for dry skin. Negative for ulcer, blister or skin breakdown.   Neurological: He is alert and oriented to person, place, and time. He has normal reflexes. He displays normal reflexes. He exhibits abnormal muscle tone. Coordination normal.   Contractures R hand   Skin: Skin is warm.   Ecchymoses and purpura on R UE bilaterally  Multiple patches of white flakey skin on UE bilaterally  4cm mass central back, firm, moveable, with small eschar    Psychiatric: He has a normal mood and affect. His behavior is normal. Judgment and thought content normal.       Lab Results   Component Value Date    WBC 10.05 05/25/2016    HGB 14.7 05/25/2016    HCT 42.9 05/25/2016     05/25/2016    CHOL 176 05/05/2017    TRIG 189 (H) 05/05/2017    HDL 36 (L) 05/05/2017    ALT 29 05/25/2016    AST 24 05/25/2016     04/20/2017    K 4.2 04/20/2017     04/20/2017    CREATININE 1.4 04/20/2017    BUN 17 04/20/2017    CO2 28 04/20/2017    INR 1.1 05/25/2016    HGBA1C 9.0 (H) 05/05/2017         Assessment:   66 y.o. male with multiple co-morbid illnesses here to continue work-up of chronic issues notably DM, HTN,  H/o stroke, cervical arthritis.     Plan:     Problem List Items Addressed This Visit        Neuro    Hemiplegia and hemiparesis following cerebral infarction affecting right dominant side     2007 stroke at [facility] with residual weakness and apraxia of R hand, large chronic L hemisphere infarction on MRI  · F/U  Neuro  · Continue OT         Relevant Medications    lisinopril (PRINIVIL,ZESTRIL) 5 MG tablet       Cardiac    Aortic arch atherosclerosis     Aortic arch atherosclerosis on CTA head/neck on 5/5/17. Compliant with high intensity statin, ASA  · Continue statin, ASA  · Improve BP control  · Bring BP log to appts  · Consider cardiology consult once acute issues addressed         Senile purpura     Ecchymoses and pupura on UE bilaterally  · Continue APT  · Advised mindfulness of movements         Relevant Orders    Vascular Lab () US Abdominal Aorta    Hepatitis C antibody    HIV-1 and HIV-2 antibodies       Renal    Uncontrolled secondary diabetes mellitus with stage 3 CKD (GFR 30-59)     GFR 51.6, A1c 9 on 5/5  · Start metformin 500mg XR  · Monitor kidney function q3 mos  · Start ACE  · Check urine protein/cr ratio         Relevant Medications    lisinopril (PRINIVIL,ZESTRIL) 5 MG tablet    metformin (GLUCOPHAGE-XR) 500 MG 24 hr tablet    Other Relevant Orders    Microalbumin/creatinine urine ratio    Basic metabolic panel       Endocrine    Type 2 diabetes mellitus with complication, without long-term current use of insulin     Newly diagnosed 5/5/17, A1c 9  · Start meteformin 500mg daily  · Increase to BID at next appt  · Diabetic foot exam today  · Diabetic camera eye screening today         Relevant Medications    lisinopril (PRINIVIL,ZESTRIL) 5 MG tablet    metformin (GLUCOPHAGE-XR) 500 MG 24 hr tablet    Other Relevant Orders    Diabetic Eye Screening Photo    Microalbumin/creatinine urine ratio    Ambulatory Referral to Podiatry       Fluids/Electrolytes/Nutrition/GI    Dyslipidemia with low high density lipoprotein (HDL) cholesterol with hypertriglyceridemia due to type 2 diabetes mellitus     HDL 37, but ASCVD risk high, compliant with atorvastatin 40mg  · Continue statin   · Start metformin 500mg   · Increase to BID at next appt         Relevant Medications    lisinopril (PRINIVIL,ZESTRIL) 5 MG tablet     metformin (GLUCOPHAGE-XR) 500 MG 24 hr tablet    Other Relevant Orders    Diabetic Eye Screening Photo    Ambulatory Referral to Podiatry       Musculoskeletal and Integument    Cervical arthritis     Cervical spine degenerative disc disease at C3-C4, C5-C6, and C6-C7.  Uncovertebral joint osteophytes bilaterally at C3-C4 and C5-C6 and C6-C7. Weakness of R hand attributed to cervical disease, minimal neck pain  · Continue OT per Neuro           Other Visit Diagnoses     Encounter for screening for HIV         Relevant Orders    HIV-1 and HIV-2 antibodies          Health Maintenance       Date Due Completion Date    Hepatitis C Screening 1950 ---TODAY    Foot Exam 10/12/1959 ---TODAY    Eye Exam 10/12/1959 ---TODAY    Urine Microalbumin 10/12/1959 ---TODAY    TETANUS VACCINE 10/12/1967 ---    Colonoscopy 10/12/1999 ---    Zoster Vaccine 10/12/2009 ---    Pneumococcal (65+) (1 of 2 - PCV13) 10/12/2014 ---    Abdominal Aortic Aneurysm Screening 10/12/2014 ---TODAY    Influenza Vaccine 08/01/2017 ---    Hemoglobin A1c 11/05/2017 5/5/2017    Lipid Panel 05/05/2018 5/5/2017          Return in about 4 weeks (around 6/20/2017). . One hour spent with this patient today, half of that in counseling.    Karen Chacon MD/MPH  Internal Medicine  [facility]  [phone]

## 2017-05-23 NOTE — PATIENT INSTRUCTIONS
TODAY:  - call [employer] to change PCP to Dr Chacon  - bring BP log to next appt  - diabetic eye camera today  - start metformin after biggest meal to treat diabetes  - start lisinopril for blood pressure and prevention  - abdominal aortic aneurysm ultrasound  - labs at next appointment    NEXT:  - talk about vaccines  - review results  - podiatry appt

## 2017-05-23 NOTE — ASSESSMENT & PLAN NOTE
2007 stroke at [facility] with residual weakness and apraxia of R hand, large chronic L hemisphere infarction on MRI  · F/U Neuro  · Continue OT

## 2017-05-23 NOTE — ASSESSMENT & PLAN NOTE
HDL 37, but ASCVD risk high, compliant with atorvastatin 40mg  · Continue statin   · Start metformin 500mg   · Increase to BID at next appt

## 2017-05-23 NOTE — ASSESSMENT & PLAN NOTE
Aortic arch atherosclerosis on CTA head/neck on 5/5/17. Compliant with high intensity statin, ASA  · Continue statin, ASA  · Improve BP control  · Bring BP log to appts  · Consider cardiology consult once acute issues addressed

## 2017-05-23 NOTE — ASSESSMENT & PLAN NOTE
Newly diagnosed 5/5/17, A1c 9  · Start meteformin 500mg daily  · Increase to BID at next appt  · Diabetic foot exam today  · Diabetic camera eye screening today

## 2017-05-23 NOTE — ASSESSMENT & PLAN NOTE
GFR 51.6, A1c 9 on 5/5  · Start metformin 500mg XR  · Monitor kidney function q3 mos  · Start ACE  · Check urine protein/cr ratio

## 2017-05-23 NOTE — ASSESSMENT & PLAN NOTE
Cervical spine degenerative disc disease at C3-C4, C5-C6, and C6-C7.  Uncovertebral joint osteophytes bilaterally at C3-C4 and C5-C6 and C6-C7. Weakness of R hand attributed to cervical disease, minimal neck pain  · Continue OT per Neuro

## 2017-05-24 ENCOUNTER — CLINICAL SUPPORT (OUTPATIENT)
Dept: REHABILITATION | Facility: HOSPITAL | Age: 67
End: 2017-05-24
Attending: FAMILY MEDICINE
Payer: MEDICARE

## 2017-05-24 ENCOUNTER — CLINICAL SUPPORT (OUTPATIENT)
Dept: PHYSICAL MEDICINE AND REHAB | Facility: CLINIC | Age: 67
End: 2017-05-24
Payer: MEDICARE

## 2017-05-24 VITALS
HEART RATE: 76 BPM | WEIGHT: 161.25 LBS | SYSTOLIC BLOOD PRESSURE: 114 MMHG | BODY MASS INDEX: 25.91 KG/M2 | DIASTOLIC BLOOD PRESSURE: 78 MMHG | HEIGHT: 66 IN

## 2017-05-24 DIAGNOSIS — R27.8 LOSS OF COORDINATION: ICD-10-CM

## 2017-05-24 DIAGNOSIS — R68.89 IMPAIRED FUNCTION OF UPPER EXTREMITY: Primary | ICD-10-CM

## 2017-05-24 DIAGNOSIS — M25.611 STIFFNESS OF SHOULDER JOINT, RIGHT: ICD-10-CM

## 2017-05-24 DIAGNOSIS — M77.11 RIGHT TENNIS ELBOW: Primary | ICD-10-CM

## 2017-05-24 DIAGNOSIS — R29.898 DECREASED GRIP STRENGTH OF RIGHT HAND: ICD-10-CM

## 2017-05-24 PROCEDURE — 99212 OFFICE O/P EST SF 10 MIN: CPT | Mod: S$GLB,,, | Performed by: PHYSICAL MEDICINE & REHABILITATION

## 2017-05-24 PROCEDURE — 97112 NEUROMUSCULAR REEDUCATION: CPT | Mod: PN

## 2017-05-24 PROCEDURE — 99999 PR PBB SHADOW E&M-EST. PATIENT-LVL III: CPT | Mod: PBBFAC,,, | Performed by: PHYSICAL MEDICINE & REHABILITATION

## 2017-05-24 PROCEDURE — 97140 MANUAL THERAPY 1/> REGIONS: CPT | Mod: PN

## 2017-05-24 PROCEDURE — 97110 THERAPEUTIC EXERCISES: CPT | Mod: PN

## 2017-05-24 NOTE — PROGRESS NOTES
"Occupational Therapy    Date:  05/24/2017  Start Time:  1405  Stop Time:  1505    TIMED  Procedure Time Min.   Manual (2) Start:  Stop: 35   Neuro Re-ed (1) Start:  Stop: 15   TherEx (1) Start:  Stop: 10   Total Timed Minutes:  60  Total Timed Units:  4  Total Untimed Units:  0  Charges Billed/# of units:  4    Progress/Current Status    Subjective:     Patient ID: Neptali Gonzalez is a 66 y.o. male.  Diagnosis:   1. Impaired function of upper extremity     2. Stiffness of shoulder joint, right     3. Decreased  strength of right hand     4. Loss of coordination       Pain: 0 /10  "It's all about the pain."  According to pt, there was blockage in ICAs per CTA, about 50%. He has started taking metformin, amlodipine, lipitor and lisinopril in addition to aspirin.  Has [employer] presentation in [place] this Friday and work next Tuesday, so needs to cancel those two appointments.     Objective:     Sh flex=90a standing  elsa scap retraction 2x10  Mobilization and stretching of anterior shoulder complex, pec major, combined internal rotators, subscapularis.  KT to inhibit subscapularis, applied from anterior shoulder, y-cut along borders of scapula, ~15% tension.   Facilitation of lateral pelvic tilting with positioning of arms in shoulder and wrist extension to encourage scapulo-pelvic rhythm  Mobilization of hand, wrist and forearm to increase mobility  Reviewed stretch to ER@0*abd and pt perf'd 10x30s  ER@0*abd = 46*    Assessment:     Pt w/ increased tenderness, tightness in the right shoulder compared to previous session. He did respond well to manual tx and KT this session, but seems to be having difficulty with carryover of HEP to maintain gains made in therapy sessions. Pt does have difficulty with the R UE and can continue to benefit from skilled OT to increase R UE motor control and coordination for increased indep and efficiency with all desired tasks.    Patient Education/Response:     Continue " positoning for inhibition, stretch to ER@0*abd. Wear/care for KT (handout given). Pt v/u.    Plans and Goals:     Continue with manual tx, progress neuro re-ed as tolerated.    Krystle Holloway, ALEXANDRE, LOTR  5/24/2017

## 2017-05-24 NOTE — PROGRESS NOTES
HPI:  Patient is a 66 y.o. year old male w. Right arm weakness. He was evaluated by neurology and determined that he is s/p cva which is causing his weakness and tremor of the right arm and hand.  They have also ordered numerous tests. He would like to hold off on any treatments for his shoulder until this is completed. He began occupational therapy for his arm but has only gone a few session thus far      Past Medical History:   Diagnosis Date    Coronary artery disease 2002    stent    Hypertension     Kidney stones     MI (myocardial infarction)      Past Surgical History:   Procedure Laterality Date    CARDIAC SURGERY      CORONARY STENT PLACEMENT      LITHOTRIPSY      TONSILLECTOMY       Family History   Problem Relation Age of Onset    Diabetes Mother     Dementia Mother     Other Father     Cystic fibrosis Son      Social History     Social History    Marital status:      Spouse name: N/A    Number of children: N/A    Years of education: N/A     Social History Main Topics    Smoking status: Former Smoker     Packs/day: 1.00     Years: 3.00     Start date: 1/1/1967    Smokeless tobacco: Never Used    Alcohol use No      Comment: OCC    Drug use: No    Sexual activity: Yes     Partners: Male     Birth control/ protection: Condom     Other Topics Concern    Not on file     Social History Narrative    No narrative on file       Review of patient's allergies indicates:  No Known Allergies    Current Outpatient Prescriptions:     amlodipine (NORVASC) 10 MG tablet, Take 1 tablet (10 mg total) by mouth once daily. (Patient taking differently: Take 10 mg by mouth once daily. PRN), Disp: 30 tablet, Rfl: 1    aspirin (ECOTRIN) 81 MG EC tablet, Take 81 mg by mouth once daily., Disp: , Rfl:     atorvastatin (LIPITOR) 40 MG tablet, Take 1 tablet (40 mg total) by mouth once daily., Disp: 90 tablet, Rfl: 11    lisinopril (PRINIVIL,ZESTRIL) 5 MG tablet, Take 1 tablet (5 mg total) by mouth  once daily., Disp: 90 tablet, Rfl: 3    metformin (GLUCOPHAGE-XR) 500 MG 24 hr tablet, Take 1 tablet (500 mg total) by mouth daily with breakfast., Disp: 90 tablet, Rfl: 3    Review of Systems  No nausea, vomiting, fevers, Chills , contipation, diarrhea or sweats      Physical Exam:      Vitals:    05/24/17 1220   BP: 114/78   Pulse: 76   alert and oriented ×4 follows commands answers all questions appropriately,affect wnl  Manual muscle test 5 out of 5  Except RUE 4/5   +right arm and hand resting tremor  No C/C/E    Assessment:  Right hand weakness  W.apraxia d/t old cva being followed by neuro    Plan:  Cont OT for strengthening  F/u w. Neuro

## 2017-05-25 ENCOUNTER — CLINICAL SUPPORT (OUTPATIENT)
Dept: CARDIOLOGY | Facility: CLINIC | Age: 67
End: 2017-05-25
Attending: INTERNAL MEDICINE
Payer: MEDICARE

## 2017-05-25 ENCOUNTER — TELEPHONE (OUTPATIENT)
Dept: INTERNAL MEDICINE | Facility: CLINIC | Age: 67
End: 2017-05-25

## 2017-05-25 DIAGNOSIS — D69.2 SENILE PURPURA: ICD-10-CM

## 2017-05-25 LAB — VASCULAR ABDOMINAL AORTIC ANEURYSM (AAA): 2.61

## 2017-05-25 PROCEDURE — 93978 VASCULAR STUDY: CPT | Mod: S$GLB,,, | Performed by: INTERNAL MEDICINE

## 2017-05-25 NOTE — TELEPHONE ENCOUNTER
Patient called by PCP to let him know that his abdominal aortic ultrasound was abnormal. [place] was at the airport and did not want to talk about the results. He reported that he will be back in town tomorrow afternoon.    Ms Hernandez- could you call him tomorrow afternoon when we're done with patients to see if he's available to talk?    Thanks,  KJ

## 2017-05-31 ENCOUNTER — TELEPHONE (OUTPATIENT)
Dept: NEUROLOGY | Facility: CLINIC | Age: 67
End: 2017-05-31

## 2017-05-31 NOTE — TELEPHONE ENCOUNTER
----- Message from Daria Gongora sent at 5/31/2017  1:07 PM CDT -----  Contact: self @ [phone]  Pt says he is returning dr jiang's call concerning his echo results from 5-8-17.  pls call.

## 2017-05-31 NOTE — TELEPHONE ENCOUNTER
Called patient and left voicemail to inform that  Is out of office, but routed a message, and i will return a call to him tomorrow.

## 2017-06-01 ENCOUNTER — TELEPHONE (OUTPATIENT)
Dept: NEUROLOGY | Facility: CLINIC | Age: 67
End: 2017-06-01

## 2017-06-01 ENCOUNTER — CLINICAL SUPPORT (OUTPATIENT)
Dept: REHABILITATION | Facility: HOSPITAL | Age: 67
End: 2017-06-01
Attending: FAMILY MEDICINE
Payer: MEDICARE

## 2017-06-01 DIAGNOSIS — M25.611 STIFFNESS OF SHOULDER JOINT, RIGHT: ICD-10-CM

## 2017-06-01 DIAGNOSIS — R29.898 DECREASED GRIP STRENGTH OF RIGHT HAND: ICD-10-CM

## 2017-06-01 DIAGNOSIS — R68.89 IMPAIRED FUNCTION OF UPPER EXTREMITY: Primary | ICD-10-CM

## 2017-06-01 DIAGNOSIS — R27.8 LOSS OF COORDINATION: ICD-10-CM

## 2017-06-01 PROCEDURE — 97140 MANUAL THERAPY 1/> REGIONS: CPT | Mod: PN

## 2017-06-01 PROCEDURE — 97112 NEUROMUSCULAR REEDUCATION: CPT | Mod: PN

## 2017-06-01 NOTE — PROGRESS NOTES
"Occupational Therapy    Date:  06/01/2017  Start Time:  1305  Stop Time:  1410    TIMED  Procedure Time Min.   Manual (2) Start:  Stop: 30   Neuro Re-ed (2) Start:  Stop: 35   Total Timed Minutes:  65  Total Timed Units:  4  Total Untimed Units:  0  Charges Billed/# of units:  4    Progress/Current Status    Subjective:     Patient ID: Neptali Gonzalez is a 66 y.o. male.  Diagnosis:   1. Impaired function of upper extremity     2. Stiffness of shoulder joint, right     3. Decreased  strength of right hand     4. Loss of coordination       Pain: 0 /10 at rest. Up to a 6/10 "when it hurts, when I raise it up to high."  "I can get back here a little more with it." (extension)    Objective:     Sh flex AROM w/ scap rotation facilitation - unable to maintain wrist/digit extension, unable to keep elbow extended, x5-10 at a time throughout tx  Mob hand,wrist, forearm to improve mobility. Applied paddle and docking station to maintain positioning in extension and inhibit wrist and long finger flexors throughout remainder of treatment.  Mobilization and stretching of anterior shoulder complex, pec major, combined internal rotators, subscapularis.  Supine mobilization and stretching of sh flex/ER w/ trunk rotation  Sit<>stand, mult trials w/ hand positioning at side to push up and reach back for seat  Facilitation of seated lat pelvic tilt  Removed docking station  AROM wrist extension x10  Repeat sh flex w/ scap rotation and pt able to maintain elbow extension and wrist extension, 2x10  Removed paddle  Repeat sh flex w/ scap rotation, decreased ability to maintain elbow ext and wrist ext compared to with paddle.    Supine passive abd=80  ER@0*abd=48  ER@45*abd=40        Assessment:     By end of session, pt able to keep elbow extended during shoulder flex AROM w/ facilitated scap rotation with paddle on. However, this decreased with paddle removed. Pt w/ some progress since initial eval toward ST goals. Pt can " "continue to benefit from skilled OT to increase active control throughout the R UE for increased indep and efficiency with R UE function to increase participation in valued activities.    LTG GOALS:  Time frame: 12 weeks (5/2-7/25)  1) Pt will be able to keep the right hand open during overhead reaching.   2) Pt will be able to use the R UE to lift large "light" objects (less than 10#) requiring 2 hands to waist height to increase indep w/ work-related activity.  3) Pt will be able to sign his name legibly with modified technique/ built-up pen as necessary.  4) Pt will demo >3/4 AROM throughout the R UE for increased function.      STG Goals:  Time frame: 4 weeks (5/2-5/30)  1) Pt will be mod I with initial HEP. (progressing)  2) Pt will improve PROM in ER by at least 20* in each measured plane to increase potential function in the R UE. (progressing)    Patient Education/Response:     Continue HEP, try and increase consistency. Pt v/u.    Plans and Goals:     Continue mobilizing shoulder as tolerated, facilitate active ER.    ALEXANDRE Rojo LOTR  6/1/2017    G-codes: carry/handle  Current: 40-60% impaired  Goal: 20-40% impaired    ALEXANDRE Rojo LOTR  6/1/2017   "

## 2017-06-01 NOTE — TELEPHONE ENCOUNTER
----- Message from Olivia Valle MA sent at 5/31/2017  3:39 PM CDT -----  Contact: self @ [phone]   i called patient left voicemail informing you were out of office.  Please advise.    ----- Message -----  From: Daria Gongora  Sent: 5/31/2017   1:07 PM  To: Herman Jiang Staff    Pt says he is returning dr jiang's call concerning his echo results from 5-8-17.  pls call.

## 2017-06-05 ENCOUNTER — TELEPHONE (OUTPATIENT)
Dept: OPTOMETRY | Facility: CLINIC | Age: 67
End: 2017-06-05

## 2017-06-06 ENCOUNTER — CLINICAL SUPPORT (OUTPATIENT)
Dept: REHABILITATION | Facility: HOSPITAL | Age: 67
End: 2017-06-06
Attending: FAMILY MEDICINE
Payer: MEDICARE

## 2017-06-06 DIAGNOSIS — R29.898 DECREASED GRIP STRENGTH OF RIGHT HAND: ICD-10-CM

## 2017-06-06 DIAGNOSIS — R27.8 LOSS OF COORDINATION: ICD-10-CM

## 2017-06-06 DIAGNOSIS — M25.611 STIFFNESS OF SHOULDER JOINT, RIGHT: ICD-10-CM

## 2017-06-06 DIAGNOSIS — R68.89 IMPAIRED FUNCTION OF UPPER EXTREMITY: Primary | ICD-10-CM

## 2017-06-06 PROCEDURE — 97140 MANUAL THERAPY 1/> REGIONS: CPT | Mod: PN

## 2017-06-06 PROCEDURE — 97112 NEUROMUSCULAR REEDUCATION: CPT | Mod: PN

## 2017-06-06 NOTE — PROGRESS NOTES
Occupational therapy    Date:  06/06/2017  Start Time:  1311   Stop Time:  1406    TIMED  Procedure Time Min.   Neuro Re-ed (3) Start:  Stop: 3   Manual (1) Start:  Stop: 1     Total Timed Minutes:  55  Total Timed Units:  4  Total Untimed Units:  0  Charges Billed/# of units:  4      Progress/Current Status    Subjective:     Patient ID: Neptali Gonzalez is a 66 y.o. male.  Diagnosis:   1. Impaired function of upper extremity     2. Stiffness of shoulder joint, right     3. Decreased  strength of right hand     4. 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   Applied paddle and docking station to maintain positioning in extension and inhibit wrist and long finger flexors throughout remainder of treatment. Also applied bivalve to elbow for inhibition of elbow flexors.  Standing AAROM shldr flexion w/ facilitation of scapular rotation  Supine mobilization and stretching of sh flex/ER w/ trunk rotation  Mobilization of pec minor  Mobilization and stretching of anterior shoulder complex, pec major, combined internal rotators, subscapularis.  Seated reaching w/ facilitation of scap rotation  Facilitation of seated lat pelvic tilt   Seated lifting of UE into shldr flexion to hit three targets in succession 2x10, removed bivalve and repeated 1x10  Removed docking station  Repeat sh flex w/ scap rotation and pt able to maintain elbow extension and wrist extension, 2x10    Assessment:     Improved ability to control distal tightness at end of session compared to previous treatment after paddle/docking station/bivalve. Mr. Gonzalez can continue to benefit from skilled OT to improve muscle balance and neural communication on the R side for increased function and independence with all desired activities.    Patient Education/Response:      Continue stretching and postioning as previously discussed. He v/u.    Plans and Goals:     Continue inhibition during shoulder function    ALEXANDRE Rojo, LOTR  6/6/2017

## 2017-06-12 RX ORDER — AMLODIPINE BESYLATE 10 MG/1
10 TABLET ORAL DAILY
Qty: 30 TABLET | Refills: 0 | Status: SHIPPED | OUTPATIENT
Start: 2017-06-12 | End: 2017-06-13 | Stop reason: SDUPTHER

## 2017-06-12 NOTE — TELEPHONE ENCOUNTER
----- Message from Courtney Serrato sent at 6/12/2017 12:37 PM CDT -----  Contact: Self/ [phone]   Type: Rx    Name of medication(s): amlodipine (NORVASC) 10 MG tablet    Is this a refill? New rx? Refill     Who prescribed medication? Dr. Chacon     Pharmacy Name, Phone, & Location: [facility] on file     Comments: pt is calling to have a refill on the medication above. Please call and advise

## 2017-06-13 ENCOUNTER — TELEPHONE (OUTPATIENT)
Dept: REHABILITATION | Facility: HOSPITAL | Age: 67
End: 2017-06-13

## 2017-06-14 RX ORDER — AMLODIPINE BESYLATE 10 MG/1
TABLET ORAL
Qty: 90 TABLET | Refills: 3 | Status: SHIPPED | OUTPATIENT
Start: 2017-06-14 | End: 2017-08-22 | Stop reason: SDUPTHER

## 2017-06-15 ENCOUNTER — TELEPHONE (OUTPATIENT)
Dept: REHABILITATION | Facility: HOSPITAL | Age: 67
End: 2017-06-15

## 2017-06-15 ENCOUNTER — DOCUMENTATION ONLY (OUTPATIENT)
Dept: REHABILITATION | Facility: HOSPITAL | Age: 67
End: 2017-06-15

## 2017-06-15 NOTE — DISCHARGE SUMMARY
REHAB SERVICES OUTPATIENT DISCHARGE SUMMARY  Occupational Therapy      Name:  Neptali Gonzalez  Date:  6/15/2017  Date of Evaluation:  5/2/2017  Physician:  Dr. Sanchez-Fee  Total # Of Visits:  6  Cancelled:  0  No Shows:  4  Diagnosis:  Apraxia; CVA    Physical/Functional Status:  At time of discharge, patient had slightly improved movement in his arm compared to initial eval, but overall function was largely unchanged.     The patient is to be discharged from our Therapy service for the following reason(s):  Patient has not attended therapy since 6/6/2017. He has missed the last 3 visits without calling to cancel and has not returned OT's calls.    Degree of Goal Achievement:  Patient has not met goals secondary to:  Limited compliance with HEP; not attending OT sessions    Patient Education:  Pt was given HEP while in therapy. It is recommended that he continue to follow it.    Discharge Plan:  F/U w/ MD as appropriate.    ALEXANDRE Rojo LOTR  6/15/2017

## 2017-06-15 NOTE — TELEPHONE ENCOUNTER
LM for pt regarding expressing concern that he did not show for scheduled OT visit today. Nonetheless, this is the 3rd visit for which he has not shown up and not called. Nor has he returned any of the OT's calls. Will d/c chart. Notified pt that he will need new order from MD to return to therapy and left number for  in case he decides he wants to return.

## 2017-06-27 ENCOUNTER — OFFICE VISIT (OUTPATIENT)
Dept: INTERNAL MEDICINE | Facility: CLINIC | Age: 67
End: 2017-06-27
Payer: MEDICARE

## 2017-06-27 ENCOUNTER — OFFICE VISIT (OUTPATIENT)
Dept: PODIATRY | Facility: CLINIC | Age: 67
End: 2017-06-27
Payer: MEDICARE

## 2017-06-27 ENCOUNTER — LAB VISIT (OUTPATIENT)
Dept: LAB | Facility: HOSPITAL | Age: 67
End: 2017-06-27
Attending: INTERNAL MEDICINE
Payer: MEDICARE

## 2017-06-27 ENCOUNTER — DOCUMENTATION ONLY (OUTPATIENT)
Dept: INTERNAL MEDICINE | Facility: CLINIC | Age: 67
End: 2017-06-27

## 2017-06-27 VITALS
HEART RATE: 60 BPM | WEIGHT: 162.5 LBS | BODY MASS INDEX: 26.12 KG/M2 | HEIGHT: 66 IN | SYSTOLIC BLOOD PRESSURE: 129 MMHG | DIASTOLIC BLOOD PRESSURE: 75 MMHG

## 2017-06-27 VITALS
WEIGHT: 157.88 LBS | HEART RATE: 54 BPM | SYSTOLIC BLOOD PRESSURE: 122 MMHG | BODY MASS INDEX: 25.37 KG/M2 | OXYGEN SATURATION: 97 % | DIASTOLIC BLOOD PRESSURE: 62 MMHG | HEIGHT: 66 IN

## 2017-06-27 DIAGNOSIS — Z11.4 ENCOUNTER FOR SCREENING FOR HIV: ICD-10-CM

## 2017-06-27 DIAGNOSIS — E11.8 TYPE 2 DIABETES MELLITUS WITH COMPLICATION, WITHOUT LONG-TERM CURRENT USE OF INSULIN: ICD-10-CM

## 2017-06-27 DIAGNOSIS — I70.0 AORTIC ARCH ATHEROSCLEROSIS: ICD-10-CM

## 2017-06-27 DIAGNOSIS — B35.1 ONYCHOMYCOSIS OF MULTIPLE TOENAILS WITH TYPE 2 DIABETES MELLITUS: Primary | ICD-10-CM

## 2017-06-27 DIAGNOSIS — M47.812 CERVICAL ARTHRITIS: ICD-10-CM

## 2017-06-27 DIAGNOSIS — I77.819 ECTATIC AORTA: ICD-10-CM

## 2017-06-27 DIAGNOSIS — I77.1 ILIAC ARTERY STENOSIS, RIGHT: ICD-10-CM

## 2017-06-27 DIAGNOSIS — E11.69 ONYCHOMYCOSIS OF MULTIPLE TOENAILS WITH TYPE 2 DIABETES MELLITUS: Primary | ICD-10-CM

## 2017-06-27 DIAGNOSIS — D69.2 SENILE PURPURA: ICD-10-CM

## 2017-06-27 DIAGNOSIS — Z12.11 SCREENING FOR COLON CANCER: Primary | ICD-10-CM

## 2017-06-27 LAB
ANION GAP SERPL CALC-SCNC: 10 MMOL/L
BUN SERPL-MCNC: 19 MG/DL
CALCIUM SERPL-MCNC: 9.6 MG/DL
CHLORIDE SERPL-SCNC: 99 MMOL/L
CO2 SERPL-SCNC: 27 MMOL/L
CREAT SERPL-MCNC: 1.5 MG/DL
EST. GFR  (AFRICAN AMERICAN): 55 ML/MIN/1.73 M^2
EST. GFR  (NON AFRICAN AMERICAN): 47 ML/MIN/1.73 M^2
GLUCOSE SERPL-MCNC: 166 MG/DL (ref 70–110)
GLUCOSE SERPL-MCNC: 246 MG/DL
HCV AB SERPL QL IA: NEGATIVE
HIV 1+2 AB+HIV1 P24 AG SERPL QL IA: NEGATIVE
POTASSIUM SERPL-SCNC: 4.3 MMOL/L
SODIUM SERPL-SCNC: 136 MMOL/L

## 2017-06-27 PROCEDURE — 1126F AMNT PAIN NOTED NONE PRSNT: CPT | Mod: S$GLB,,, | Performed by: INTERNAL MEDICINE

## 2017-06-27 PROCEDURE — 4010F ACE/ARB THERAPY RXD/TAKEN: CPT | Mod: S$GLB,,, | Performed by: PODIATRIST

## 2017-06-27 PROCEDURE — 1159F MED LIST DOCD IN RCRD: CPT | Mod: S$GLB,,, | Performed by: INTERNAL MEDICINE

## 2017-06-27 PROCEDURE — 82948 REAGENT STRIP/BLOOD GLUCOSE: CPT | Mod: S$GLB,,, | Performed by: INTERNAL MEDICINE

## 2017-06-27 PROCEDURE — 3045F PR MOST RECENT HEMOGLOBIN A1C LEVEL 7.0-9.0%: CPT | Mod: S$GLB,,, | Performed by: INTERNAL MEDICINE

## 2017-06-27 PROCEDURE — 99999 PR PBB SHADOW E&M-EST. PATIENT-LVL III: CPT | Mod: PBBFAC,,, | Performed by: PODIATRIST

## 2017-06-27 PROCEDURE — 1159F MED LIST DOCD IN RCRD: CPT | Mod: S$GLB,,, | Performed by: PODIATRIST

## 2017-06-27 PROCEDURE — 4010F ACE/ARB THERAPY RXD/TAKEN: CPT | Mod: S$GLB,,, | Performed by: INTERNAL MEDICINE

## 2017-06-27 PROCEDURE — 1126F AMNT PAIN NOTED NONE PRSNT: CPT | Mod: S$GLB,,, | Performed by: PODIATRIST

## 2017-06-27 PROCEDURE — 99999 PR PBB SHADOW E&M-EST. PATIENT-LVL III: CPT | Mod: PBBFAC,,, | Performed by: INTERNAL MEDICINE

## 2017-06-27 PROCEDURE — 3045F PR MOST RECENT HEMOGLOBIN A1C LEVEL 7.0-9.0%: CPT | Mod: S$GLB,,, | Performed by: PODIATRIST

## 2017-06-27 PROCEDURE — 99203 OFFICE O/P NEW LOW 30 MIN: CPT | Mod: S$GLB,,, | Performed by: PODIATRIST

## 2017-06-27 PROCEDURE — 99215 OFFICE O/P EST HI 40 MIN: CPT | Mod: S$GLB,,, | Performed by: INTERNAL MEDICINE

## 2017-06-27 RX ORDER — KETOCONAZOLE 20 MG/G
CREAM TOPICAL DAILY
Qty: 30 G | Refills: 1 | Status: SHIPPED | OUTPATIENT
Start: 2017-06-27

## 2017-06-27 NOTE — ASSESSMENT & PLAN NOTE
R iliac artery stenosis seen on AA US in 5/2017, stent placed at [facility] in 2007  · Cardiology referral once acute issues addressed  · Continue tertiary prevention with APT, statin, BP control, DM control

## 2017-06-27 NOTE — ASSESSMENT & PLAN NOTE
GFR 47, A1c 9 on 5/5  · Tolerating metformin 500mg XR  · Keep at this dose due to GFR 47  · Monitor kidney function q3 mos  · Continue ACE  · Normal urine protein/cr ratio in 5/2017  · Avoid NSAIDs

## 2017-06-27 NOTE — ASSESSMENT & PLAN NOTE
Aortic arch atherosclerosis on CTA head/neck on 5/5/17. Compliant with high intensity statin, ASA  · Continue statin, ASA  · BP controlled on ACE and CCB  · DM control  · Consider cardiology or vascular consult once acute issues addressed

## 2017-06-27 NOTE — ASSESSMENT & PLAN NOTE
Abdominal aorta ectasis seen on AA  5/2017  · Continue secondary prevention with APT, statin, BP, DM control  · Will refer to Cardiology in future

## 2017-06-27 NOTE — PATIENT INSTRUCTIONS
TODAY:  - stool card for colon cancer screening  - avoid NSAIDs (aleve, ibuprofen, goody powder, naproxen)  - continue low dose metformin  - diabetes education      NEXT APPT:  - discuss pneumonia vaccine

## 2017-06-27 NOTE — PROGRESS NOTES
"Primary Care Provider Appointment    Subjective:      Patient ID: Neptali Gonzalez is a 66 y.o. male with DM, CKD, HLD    Chief Complaint: Follow-up (4 week follow up Patient stated that I'm doing great")    He has been compliant with metformin 500mg daily with no diarrhea. Last A1c was 9. GFR was 47 on 6/27/17 after one month of metformin and ACE. Patient does not check CBG at home.     Patient with abnormal AAA screening- ectatic aorta and stenosis of R iliac artery. Underwent stent placement in 2007 in R iliac artery at [facility].     He has been participating in OT for cervical spine disease with improvement in UE function. Improvement in tremor and weakness of R hand, has increased ROM of R shoulder. Resolution of neck pain and neck stiffness. He does not have carpal tunnel, per EMG.        Past Surgical History:   Procedure Laterality Date    CARDIAC SURGERY      CORONARY STENT PLACEMENT      LITHOTRIPSY      TONSILLECTOMY         Past Medical History:   Diagnosis Date    Coronary artery disease 2002    stent    Hypertension     Kidney stones     MI (myocardial infarction)        Review of Systems   Constitutional: Negative for activity change, appetite change, fatigue and fever.   Respiratory: Negative for cough and shortness of breath.    Cardiovascular: Negative for chest pain and leg swelling.   Gastrointestinal: Negative for abdominal pain, constipation and diarrhea.   Musculoskeletal: Positive for joint swelling. Negative for back pain, neck pain and neck stiffness.   Skin: Negative for color change.   Neurological: Positive for tremors, weakness and numbness. Negative for dizziness, seizures and speech difficulty.   Hematological: Negative for adenopathy.   Psychiatric/Behavioral: Negative for agitation, confusion and decreased concentration. The patient is not nervous/anxious.        Objective:   /62 (BP Location: Left arm, Patient Position: Sitting, BP Method: Manual)   " "Pulse (!) 54   Ht 5' 6" (1.676 m)   Wt 71.6 kg (157 lb 13.6 oz)   SpO2 97%   BMI 25.48 kg/m²     Physical Exam   Constitutional: He is oriented to person, place, and time. He appears well-developed and well-nourished.   HENT:   Head: Normocephalic.   Eyes: EOM are normal.   Musculoskeletal: Normal range of motion.   Neurological: He is alert and oriented to person, place, and time.   Mild resting tremor in R hand   Skin: Skin is warm and dry.   Psychiatric: He has a normal mood and affect. His behavior is normal. Judgment and thought content normal.   Nursing note and vitals reviewed.      Lab Results   Component Value Date    WBC 10.05 05/25/2016    HGB 14.7 05/25/2016    HCT 42.9 05/25/2016     05/25/2016    CHOL 176 05/05/2017    TRIG 189 (H) 05/05/2017    HDL 36 (L) 05/05/2017    ALT 29 05/25/2016    AST 24 05/25/2016     06/27/2017    K 4.3 06/27/2017    CL 99 06/27/2017    CREATININE 1.5 (H) 06/27/2017    BUN 19 06/27/2017    CO2 27 06/27/2017    INR 1.1 05/25/2016    HGBA1C 9.0 (H) 05/05/2017         Assessment:   66 y.o. male with multiple co-morbid illnesses here to continue work-up of chronic issues notably DM, HTN, vasculopathy.     Plan:     Problem List Items Addressed This Visit        Cardiac    Aortic arch atherosclerosis     Aortic arch atherosclerosis on CTA head/neck on 5/5/17. Compliant with high intensity statin, ASA  · Continue statin, ASA  · BP controlled on ACE and CCB  · DM control  · Consider cardiology or vascular consult once acute issues addressed         Ectatic aorta     Abdominal aorta ectasis seen on [facility] 5/2017  · Continue secondary prevention with APT, statin, BP, DM control  · Will refer to Cardiology in future         Iliac artery stenosis, right     R iliac artery stenosis seen on [facility] in 5/2017, stent placed at [facility] in 2007  · Cardiology referral once acute issues addressed  · Continue tertiary prevention with APT, statin, BP control, DM " control            Renal    Uncontrolled secondary diabetes mellitus with stage 3 CKD (GFR 30-59)     GFR 47, A1c 9 on 5/5  · Tolerating metformin 500mg XR  · Keep at this dose due to GFR 47  · Monitor kidney function q3 mos  · Continue ACE  · Normal urine protein/cr ratio in 5/2017  · Avoid NSAIDs         Relevant Orders    Ambulatory consult to Diabetic Education    POCT Glucose       Endocrine    Type 2 diabetes mellitus with complication, without long-term current use of insulin     Newly diagnosed 5/5/17, A1c 9  · Tolerating metformin 500mg daily  · Postpone increase due to GFR 47  · Podiatry exam today  · Diabetic camera eye screening normal  · Continue ASA, statin         Relevant Orders    Ambulatory consult to Diabetic Education    POCT Glucose       Musculoskeletal and Integument    Cervical arthritis     Cervical spine degenerative disc disease at C3-C4, C5-C6, and C6-C7.  Uncovertebral joint osteophytes bilaterally at C3-C4 and C5-C6 and C6-C7. Weakness of R hand attributed to cervical disease, minimal neck pain  · Continue OT per Neuro  · Discharged with improvement in symptoms           Other Visit Diagnoses     Screening for colon cancer    -  Primary    Relevant Orders    Fecal Immunochemical Test (iFOBT)          Health Maintenance       Date Due Completion Date    TETANUS VACCINE 10/12/1967 ---    Colonoscopy 10/12/1999 ---TODAY iFOBT    Zoster Vaccine 10/12/2009 ---    Pneumococcal (65+) (1 of 2 - PCV13) 10/12/2014 ---NEXT    Hepatitis C Screening 06/27/2017 (Originally 1950) ---TODAY    Influenza Vaccine 08/01/2017 ---    Hemoglobin A1c 11/05/2017 5/5/2017    Lipid Panel 05/05/2018 5/5/2017    Foot Exam 05/23/2018 5/23/2017    Eye Exam 05/23/2018 5/23/2017 (Done)    Override on 5/23/2017: Done (Eye Cam done)          Return in about 6 weeks (around 8/8/2017). . One hour spent with this patient today, half of that in counseling.    Karen Chacon MD/MPH  Internal Medicine  [facility]  [phone]

## 2017-06-27 NOTE — ASSESSMENT & PLAN NOTE
Newly diagnosed 5/5/17, A1c 9  · Tolerating metformin 500mg daily  · Postpone increase due to GFR 47  · Podiatry exam today  · Diabetic camera eye screening normal  · Continue ASA, statin

## 2017-06-27 NOTE — LETTER
July 4, 2017      Karen Chacon MD  [address]  [facility] [address]           [facility]  [address]  [facility] [address]  Phone: [phone]          Patient: Neptali Gonzalez   MR Number: [id number]   YOB: 1950   Date of Visit: 6/27/2017       Dear Dr. Karen Chacon:    Thank you for referring Neptali Gonzalez to me for evaluation. Attached you will find relevant portions of my assessment and plan of care.    If you have questions, please do not hesitate to call me. I look forward to following Neptali Gonzalez along with you.    Sincerely,    Jimbo Francisco DPM    Enclosure  CC:  No Recipients    If you would like to receive this communication electronically, please contact [email or web address] or [phone] to request more information on [employer] access.    For providers and/or their staff who would like to refer a patient to [facility], please contact us through our one-stop-shop provider referral line, [facility], at [phone].    If you feel you have received this communication in error or would no longer like to receive these types of communications, please e-mail [email or web address]

## 2017-06-27 NOTE — PROGRESS NOTES
Subjective:      Patient ID: Neptali Gonzalez is a 66 y.o. male.    Chief Complaint: Diabetes Mellitus (PCP Dr. Chacon 5/23/17); Diabetic Foot Exam; and Routine Foot Care    Neptali is a 66 y.o. male who presents to the clinic upon referral from Dr. Chacon  for evaluation and treatment of diabetic feet. Neptali has a past medical history of Coronary artery disease (2002); Hypertension; Kidney stones; and MI (myocardial infarction). Patient relates no major problem with feet. Only complaints today consist of thick, fungal nails.  He denies any history of lower extremity wounds, infections and/or injury.      PCP: Karen Chacon MD      Current shoe gear: Casual shoes    Hemoglobin A1C   Date Value Ref Range Status   05/05/2017 9.0 (H) 4.5 - 6.2 % Final     Comment:     According to ADA guidelines, hemoglobin A1C <7.0% represents  optimal control in non-pregnant diabetic patients.  Different  metrics may apply to specific populations.   Standards of Medical Care in Diabetes - 2016.  For the purpose of screening for the presence of diabetes:  <5.7%     Consistent with the absence of diabetes  5.7-6.4%  Consistent with increasing risk for diabetes   (prediabetes)  >or=6.5%  Consistent with diabetes  Currently no consensus exists for use of hemoglobin A1C  for diagnosis of diabetes for children.             Review of Systems   Constitution: Negative for chills, fever, weakness, malaise/fatigue and night sweats.   Cardiovascular: Negative for chest pain, leg swelling, orthopnea and palpitations.   Respiratory: Negative for cough, shortness of breath and wheezing.    Skin: Positive for color change. Negative for itching, poor wound healing and rash.   Musculoskeletal: Negative for arthritis, gout, joint pain, joint swelling, muscle weakness and myalgias.   Gastrointestinal: Negative for abdominal pain, constipation and nausea.   Neurological: Negative for disturbances in coordination, dizziness, focal  weakness, numbness and tremors.           Objective:      Physical Exam   Cardiovascular:   Pulses:       Dorsalis pedis pulses are 2+ on the right side, and 2+ on the left side.        Posterior tibial pulses are 1+ on the right side, and 1+ on the left side.   Musculoskeletal:        Right foot: There is no deformity.        Left foot: There is normal range of motion and no deformity.   Feet:   Right Foot:   Protective Sensation: 10 sites tested. 10 sites sensed.   Skin Integrity: Negative for ulcer, blister, skin breakdown or erythema.   Left Foot:   Protective Sensation: 10 sites tested. 10 sites sensed.   Skin Integrity: Negative for ulcer, blister, skin breakdown or erythema.   Skin:   Toenails 1-5 bilaterally are thickened by 2-3 mm, discolored/yellowed, dystrophic, brittle with subungual debris.               Assessment:       Encounter Diagnosis   Name Primary?    Onychomycosis of multiple toenails with type 2 diabetes mellitus Yes         Plan:       Neptali was seen today for diabetes mellitus, diabetic foot exam and routine foot care.    Diagnoses and all orders for this visit:    Onychomycosis of multiple toenails with type 2 diabetes mellitus    Other orders  -     ketoconazole (NIZORAL) 2 % cream; Apply topically once daily. toes      I counseled the patient on his conditions, their implications and medical management.        - Shoe inspection. Diabetic Foot Education. Patient reminded of the importance of good nutrition and blood sugar control to help prevent podiatric complications of diabetes. Patient instructed on proper foot hygeine. We discussed wearing proper shoe gear, daily foot inspections, never walking without protective shoe gear.    Discussed all treatment options such as topical, oral, laser treatments vs surgical removal of nail permanent vs non-permanent in detail pertaining to nail fungus. He would like to try topical antifungals. Side effects of medication(s) were discussed in  detail and patient voiced understanding. Patient will call back for any issues or complications.     F/u 1 yr or sooner, prn.

## 2017-06-27 NOTE — ASSESSMENT & PLAN NOTE
Cervical spine degenerative disc disease at C3-C4, C5-C6, and C6-C7.  Uncovertebral joint osteophytes bilaterally at C3-C4 and C5-C6 and C6-C7. Weakness of R hand attributed to cervical disease, minimal neck pain  · Continue OT per Neuro  · Discharged with improvement in symptoms

## 2017-06-30 NOTE — TELEPHONE ENCOUNTER
On 6/15, pt no-showed for 3rd session in a row. OT left message that OT would d/c as pt has not shown for 3 visits in a row and pt had not returned calls.

## 2017-07-03 ENCOUNTER — TELEPHONE (OUTPATIENT)
Dept: REHABILITATION | Facility: HOSPITAL | Age: 67
End: 2017-07-03

## 2017-07-05 ENCOUNTER — TELEPHONE (OUTPATIENT)
Dept: INTERNAL MEDICINE | Facility: CLINIC | Age: 67
End: 2017-07-05

## 2017-07-05 NOTE — TELEPHONE ENCOUNTER
----- Message from Kmio Ibanez sent at 7/5/2017  2:46 PM CDT -----  Contact: self [phone]  Patient is returning an extend PT order for [facility] in [place] . Please call and advise , Thanks  !

## 2017-07-05 NOTE — PROGRESS NOTES
HPI     Diabetic Eye Exam    Additional comments: PHOTOS           Comments   66 y.o. y/o here for screening for Diabetic Renopathy with non-dilated   fundus photos per Karen Chacon MD         Last edited by Naila Burton MA on 7/5/2017  2:20 PM. (History)            Assessment /Plan     For exam results, see Encounter Report.    Dyslipidemia with low high density lipoprotein (HDL) cholesterol with hypertriglyceridemia due to type 2 diabetes mellitus  -     Diabetic Eye Screening Photo    Type 2 diabetes mellitus with complication, without long-term current use of insulin  -     Diabetic Eye Screening Photo

## 2017-07-06 ENCOUNTER — TELEPHONE (OUTPATIENT)
Dept: OPTOMETRY | Facility: CLINIC | Age: 67
End: 2017-07-06

## 2017-07-06 NOTE — TELEPHONE ENCOUNTER
----- Message from Courtney Serrato sent at 7/6/2017 11:19 AM CDT -----  Contact: Self/   Pt is calling to let someone know he is waiting on the doctor to send a occupational therapy. He would like it send to [facility]. [facility]. The number [phone]. Please call and advise     Thank you

## 2017-07-06 NOTE — TELEPHONE ENCOUNTER
Spoke with Krystle washington she informed me that patients appt was cancelled due to patient not having form for today , I informed Krystle that Dr. Chacon is just getting back in the office today and that the orders can be faxed over by today so that Pt can resume service tomorrow .      Fax# [phone]

## 2017-07-06 NOTE — TELEPHONE ENCOUNTER
OT discussed with pt that OT order needs to be present in system for re-eval to be completed. He states that MD is going to enter order (though it is not currently present). Encouraged pt to call back at 11 to ensure order is entered before 12pm appt. Pt guicho/ALEXANDRE Anne LOTR 7/6/2017 9:35 AM

## 2017-07-07 ENCOUNTER — CLINICAL SUPPORT (OUTPATIENT)
Dept: REHABILITATION | Facility: HOSPITAL | Age: 67
End: 2017-07-07
Attending: FAMILY MEDICINE
Payer: MEDICARE

## 2017-07-07 DIAGNOSIS — M25.611 STIFFNESS OF SHOULDER JOINT, RIGHT: ICD-10-CM

## 2017-07-07 DIAGNOSIS — R27.8 LOSS OF COORDINATION: ICD-10-CM

## 2017-07-07 DIAGNOSIS — R68.89 IMPAIRED FUNCTION OF UPPER EXTREMITY: Primary | ICD-10-CM

## 2017-07-07 DIAGNOSIS — M54.12 CERVICAL RADICULAR PAIN: ICD-10-CM

## 2017-07-07 DIAGNOSIS — R29.898 DECREASED GRIP STRENGTH OF RIGHT HAND: ICD-10-CM

## 2017-07-07 PROCEDURE — G8985 CARRY GOAL STATUS: HCPCS | Mod: CI,PN

## 2017-07-07 PROCEDURE — 97166 OT EVAL MOD COMPLEX 45 MIN: CPT | Mod: PN

## 2017-07-07 PROCEDURE — 97110 THERAPEUTIC EXERCISES: CPT | Mod: PN

## 2017-07-07 PROCEDURE — G8984 CARRY CURRENT STATUS: HCPCS | Mod: CJ,PN

## 2017-07-07 NOTE — PROGRESS NOTES
Occupational Therapy   Evaluation     Neptali Gonzalez   MRN: [id number]   Referring Physician:Karen Chacon MD      Date: 7/7/2017    OT eval complete. Please see attached POC for details. Thank you for consult!    ALEXANDRE Rojo, CASANDRA  7/7/2017

## 2017-07-11 ENCOUNTER — CLINICAL SUPPORT (OUTPATIENT)
Dept: REHABILITATION | Facility: HOSPITAL | Age: 67
End: 2017-07-11
Attending: FAMILY MEDICINE
Payer: MEDICARE

## 2017-07-11 DIAGNOSIS — M54.12 CERVICAL RADICULAR PAIN: ICD-10-CM

## 2017-07-11 DIAGNOSIS — R68.89 IMPAIRED FUNCTION OF UPPER EXTREMITY: Primary | ICD-10-CM

## 2017-07-11 DIAGNOSIS — M25.611 STIFFNESS OF SHOULDER JOINT, RIGHT: ICD-10-CM

## 2017-07-11 DIAGNOSIS — R29.898 DECREASED GRIP STRENGTH OF RIGHT HAND: ICD-10-CM

## 2017-07-11 DIAGNOSIS — R27.8 LOSS OF COORDINATION: ICD-10-CM

## 2017-07-11 PROCEDURE — 97140 MANUAL THERAPY 1/> REGIONS: CPT | Mod: PN

## 2017-07-11 PROCEDURE — 97110 THERAPEUTIC EXERCISES: CPT | Mod: PN

## 2017-07-11 PROCEDURE — 97530 THERAPEUTIC ACTIVITIES: CPT | Mod: PN

## 2017-07-11 NOTE — PROGRESS NOTES
OCCUPATIONAL THERAPY PROGRESS NOTE    Date:  07/11/2017    Start Time:  1500  Stop Time:  1600    TIMED  Procedure Time Min.   TherAct (2) Start:  Stop: 30   TherEx (1)  20   Manual (1) Start:  Stop: 10   Total Timed Minutes:  60  Total Timed Units:  4  Total Untimed Units:  0  Charges Billed/# of units:  4      Progress/Current Status    Subjective:     Patient ID: Neptali Gonzalez is a 66 y.o. male.  Diagnosis:   1. Impaired function of upper extremity     2. Stiffness of shoulder joint, right     3. Decreased  strength of right hand     4. Loss of coordination     5. Cervical radicular pain       Pain: 0 /10 during non use and at beginning and end of session.   Pt was asked if he could come to therapy Wednesday July 12, to make up for Friday the 14th (as OT needing to cx).  He agreed and was given an appointment card and reminded throughout and at the end of the session.  During activities for stretching pt reported stretching and discomfort the near the insertion of the supraspinatus muscle.      Objective:     Completed the Leonardo Cognitive Assessment (MoCA)  Pt score = 12/30 (note: patient has completed High School and two years of college) . Significant deficits noted in all categories.    Seated elsa UE WB w/ A<>P and lateral pelvic tilt to improve scapulo-pelvic rhythm and alignment.  Reviewed HEP exercise: R UE shldr stretch to ER @ ~70* shldr abd supported by table top 20x30s w/ rest breaks to reinforce proper technique and stress compliance w/ HEP. Pt initially required max cues for technique. Slight improvement with practice, but still requiring mod cues at end of practice.  Gentle mobilization of gh joint and stretching of anterior shldr complex, levator, lat, and ER rotator cuff muscles.  Repeated stretch as needed throughout session to address muscle tightness.   Seated facilitated reaching in R shldr flexion activity w/ focus on full elbow ext and over supination of R hand to promote  shldr ER.  Removed paddle and docking station, and provided gentle stretch of fingers and wrist in extension.     Assessment:     Marko was early to his appointment today, and seemed in good spirits.  He was given the MoCA as a result of difficulty following two step commands, and other inconsistencies during eval and previous treatment periods. His performance on the MoCA was ~12 points below the average for his age group in reference to his education (60-70 year olds who have completed high school and some college Mean = 24.32, based on the finding of Mesha et al. 2011).  It should be noted that the cut off MoCa score for screening for dementia is 16 (same source).  To that end we will notify the PCP and Neurologist for further testing. Marko may benefit from an ST consult. He can continue to benefit from skilled OT services to address neuro re-ed, and muscle ataxia to return to valued roles and routines.    Patient Education/Response:     Review table top ER stretch as noted above.  Pt v/u.     Plans and Goals:     Send information regarding MoCA to PCP and Neurologist.  Continue to address muscle dysfunction and tightness to improve ROM as tolerated in prep for over head movement.     Merlin Silvestre, OTS   7/11/2017    OTR completed mobilization of metacarpals, carpals, radius, ulna to decrease edema, improve ROM and decrease discomfort with stretching.  Applied paddle splint to right hand for inhibition of long finger flexors w/ docking station to allow for LLPS towards extension and inhibition of wrist flexors throughout remainder of treatment.    I certify that I was present in the room directing the student in service delivery and guiding them using my skilled judgment. As the co-signing therapist I have reviewed the students documentation and am responsible for the treatment, assessment, and plan.   Krystle Holloway, ALEXANDRE, LOTR  7/11/2017
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
## 2017-07-12 ENCOUNTER — CLINICAL SUPPORT (OUTPATIENT)
Dept: REHABILITATION | Facility: HOSPITAL | Age: 67
End: 2017-07-12
Attending: FAMILY MEDICINE
Payer: MEDICARE

## 2017-07-12 DIAGNOSIS — R29.898 DECREASED GRIP STRENGTH OF RIGHT HAND: ICD-10-CM

## 2017-07-12 DIAGNOSIS — M25.611 STIFFNESS OF SHOULDER JOINT, RIGHT: ICD-10-CM

## 2017-07-12 DIAGNOSIS — R27.8 LOSS OF COORDINATION: ICD-10-CM

## 2017-07-12 DIAGNOSIS — R68.89 IMPAIRED FUNCTION OF UPPER EXTREMITY: Primary | ICD-10-CM

## 2017-07-12 DIAGNOSIS — M54.12 CERVICAL RADICULAR PAIN: ICD-10-CM

## 2017-07-12 PROCEDURE — 97140 MANUAL THERAPY 1/> REGIONS: CPT | Mod: PN

## 2017-07-12 PROCEDURE — 97110 THERAPEUTIC EXERCISES: CPT | Mod: PN

## 2017-07-12 NOTE — PROGRESS NOTES
"TIME RECORD    Date:  07/12/2017    Start Time: 1448   Stop Time:  1557    PROCEDURES:    TIMED  Procedure Time Min.   Manual (2) Start:  Stop: 35   TherEx (2) Start:  Stop: 34     Total Timed Minutes:  69  Total Timed Units:  4  Total Untimed Units:  0  Charges Billed/# of units:  4      Progress/Current Status    Subjective:     Patient ID: Neptali Gonzalez is a 66 y.o. male.  Diagnosis:   1. Impaired function of upper extremity     2. Stiffness of shoulder joint, right     3. Loss of coordination     4. Cervical radicular pain     5. Decreased  strength of right hand       Pain: 0 /10 No pain during non-use, some sharp pain near the acromion when abd the shldr ~90*.  Pain not quantified.  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    Objective:     Discussed the results of MoCA, mentioned that we would contact his physician for follow up testing.  Pt wore paddle throughout remainder of treatment.  Facilitation of anterior/posterior pelvic tilt, R/L lateral pelvic tilt. Assist to keep R UE at side in position of support.  Mobilization of R anterior shldr complex, R levator, R lat, and combined internal rotators. Performed repeated mobs as needed to reduce muscle tension and increase PROM.    Supine stretch to Er@45*and 90* shldr abd, w/ pec minor/major and subscap release to attain needed PROM.  10x30s in both planes. PROM at max functional range.   [place] placed over R elbow to maintain extension while working overhead in shldr flexion.   Facilitated Zeyad UE shldr flexion and abd with focus on external rotation to address scapulohumeral alignment and PROM overhead as tolerated, though end-range limited by pain.  [place] removed.  Seated facilitated reaching in R shldr " flexion activity w/ focus on full elbow ext and over supination of R hand to promote shldr Er.  Removed docking station and paddle.     Assessment:     During our discussion about MoCA pt not only seemed unaware of any gaps in memory, or difficulty w/ understanding, but adamantly denied any concerns about cognition.  This conflicts w/ both the results of the MoCA and observations made during the course of his treatment.  Pt was informed that his doctor would be contacted and we recommend f/u w/ MD to address these concerns. Pt may benefit from ST consult to further assess functional cognition. PROM was better today and after muscle tension release we achieve a better range than the session prior. Marko can continue to benefit from skilled OT to address difficulty with functional use of his R UE weakness, and loss of AROM in order to return to valued daily roles and routines.     Patient Education/Response:     Reviewed HEP and pt demo'd stretching; required mod cues for technique.      Plans and Goals:     Continue with weight baring through the R UE to address tone.  Begin AROM for shldr flexion and abd.  Address coordination of R UE. Continue to refine HEP.    Merlin Silvestre, OTS   7/12/2017    OTR completed mobilization of metacarpals, carpals, radius, ulna to decrease edema, improve ROM and decrease discomfort with stretching.  Applied paddle splint to R hand with docking station to allow for LLPS towards extension and inhibition of long finger and wrist flexors.     I certify that I was present in the room directing the student in service delivery and guiding them using my skilled judgment. As the co-signing therapist I have reviewed the students documentation and am responsible for the treatment, assessment, and plan.   Krystle Holloway, ALEXANDRE, LOTR  7/12/2017

## 2017-07-13 ENCOUNTER — TELEPHONE (OUTPATIENT)
Dept: INTERNAL MEDICINE | Facility: CLINIC | Age: 67
End: 2017-07-13

## 2017-07-13 NOTE — TELEPHONE ENCOUNTER
Please let this patient know that his occupational therapists recommend speech therapy and Neurology follow-up because he still has deficiencies following his stroke.     Is he ok with attending these appointments? If so, I will place orders.    Thanks,  MICK

## 2017-07-18 ENCOUNTER — CLINICAL SUPPORT (OUTPATIENT)
Dept: REHABILITATION | Facility: HOSPITAL | Age: 67
End: 2017-07-18
Attending: FAMILY MEDICINE
Payer: MEDICARE

## 2017-07-18 ENCOUNTER — CLINICAL SUPPORT (OUTPATIENT)
Dept: DIABETES | Facility: CLINIC | Age: 67
End: 2017-07-18
Payer: MEDICARE

## 2017-07-18 ENCOUNTER — INITIAL CONSULT (OUTPATIENT)
Dept: OPTOMETRY | Facility: CLINIC | Age: 67
End: 2017-07-18
Payer: MEDICARE

## 2017-07-18 DIAGNOSIS — E11.65 TYPE 2 DIABETES MELLITUS WITH HYPERGLYCEMIA, WITHOUT LONG-TERM CURRENT USE OF INSULIN: ICD-10-CM

## 2017-07-18 DIAGNOSIS — D31.32 CHOROIDAL NEVUS, BOTH EYES: ICD-10-CM

## 2017-07-18 DIAGNOSIS — R41.89 COGNITIVE IMPAIRMENT: Primary | ICD-10-CM

## 2017-07-18 DIAGNOSIS — D31.31 CHOROIDAL NEVUS, BOTH EYES: ICD-10-CM

## 2017-07-18 DIAGNOSIS — H25.13 NUCLEAR SCLEROTIC CATARACT OF BOTH EYES: ICD-10-CM

## 2017-07-18 DIAGNOSIS — E11.65 TYPE 2 DIABETES MELLITUS WITH HYPERGLYCEMIA, WITHOUT LONG-TERM CURRENT USE OF INSULIN: Primary | ICD-10-CM

## 2017-07-18 DIAGNOSIS — E11.8 TYPE 2 DIABETES MELLITUS WITH COMPLICATION, WITHOUT LONG-TERM CURRENT USE OF INSULIN: ICD-10-CM

## 2017-07-18 DIAGNOSIS — R29.898 DECREASED GRIP STRENGTH OF RIGHT HAND: ICD-10-CM

## 2017-07-18 DIAGNOSIS — H52.4 BILATERAL PRESBYOPIA: ICD-10-CM

## 2017-07-18 DIAGNOSIS — R27.8 LOSS OF COORDINATION: ICD-10-CM

## 2017-07-18 DIAGNOSIS — Z79.84 LONG TERM CURRENT USE OF ORAL HYPOGLYCEMIC DRUG: ICD-10-CM

## 2017-07-18 DIAGNOSIS — M25.611 STIFFNESS OF SHOULDER JOINT, RIGHT: ICD-10-CM

## 2017-07-18 DIAGNOSIS — H04.123 BILATERAL DRY EYES: ICD-10-CM

## 2017-07-18 DIAGNOSIS — R68.89 IMPAIRED FUNCTION OF UPPER EXTREMITY: Primary | ICD-10-CM

## 2017-07-18 PROCEDURE — 99999 PR PBB SHADOW E&M-EST. PATIENT-LVL II: CPT | Mod: PBBFAC,,, | Performed by: OPTOMETRIST

## 2017-07-18 PROCEDURE — 92004 COMPRE OPH EXAM NEW PT 1/>: CPT | Mod: S$GLB,,, | Performed by: OPTOMETRIST

## 2017-07-18 PROCEDURE — G0108 DIAB MANAGE TRN  PER INDIV: HCPCS | Mod: S$GLB,,, | Performed by: DIETITIAN, REGISTERED

## 2017-07-18 PROCEDURE — 97140 MANUAL THERAPY 1/> REGIONS: CPT | Mod: PN

## 2017-07-18 PROCEDURE — 97112 NEUROMUSCULAR REEDUCATION: CPT | Mod: PN

## 2017-07-18 RX ORDER — CARBIDOPA AND LEVODOPA 25; 100 MG/1; MG/1
TABLET ORAL
Qty: 90 TABLET | Refills: 0 | Status: SHIPPED | OUTPATIENT
Start: 2017-07-18 | End: 2017-08-22

## 2017-07-18 NOTE — Clinical Note
Dear Dr. Chacon,  I had the pleasure of seeing Mr. Gonzalez today for a diabetic eye examination and there is no retinopathy. Please let me know if you have questions.  Sincerely, Olivia Cruz OD

## 2017-07-18 NOTE — PROGRESS NOTES
"OCCUPATIONAL THERAPY PROGRESS NOTE    Date:  07/18/2017    Start Time:  1000   Stop Time:  1101    TIMED  Procedure Time Min.   Manual(2) Start:  Stop: 31   Neuro re-ed (2) Start:  Stop: 30     Total Timed Minutes:  61  Total Timed Units:  4  Total Untimed Units:  0  Charges Billed/# of units:  4    Progress/Current Status    Subjective:     Patient ID: Neptali Gonzalez is a 66 y.o. male.  Diagnosis:   1. Impaired function of upper extremity     2. Stiffness of shoulder joint, right     3. Loss of coordination     4. Decreased  strength of right hand       Pain: During session, pt reported pain near the deltoid tuberosity (3/10) when elevating arm in shldr abd, and flexion particularly when arm is ER. Pt reports overall pain increased from last session in R UE: "I had a big job on Friday and Saturday and I'm hurting from that."  Pt reports having done HEP 4 times since last session.  Pt mentioned that he has not been playing any music: "for about the last year.  I tried playing guitar and keyboard, but I just got frustrated (due to coordination difficulty) and quit."    Objective:     Mobilization of metacarpals, carpals, radius, ulna to decrease edema, improve ROM and decrease discomfort with stretching. (Paddle placed by OTR, see below).  Pt wore paddle throughout remainder of treatment.  Shldr palpation revealed sharp pain in lateral subacromial space, subcoracoid space, and muscular tenderness in R Lat when R UE in abd.  Muscular tension noted in pec minor during shldr abd and ER.     Facilitation of anterior/posterior pelvic tilt, R/L lateral pelvic tilt. Assist to keep R UE at side in position of support. Pt's hands moved behind back as tolerated to increase challenge and improve scapulopelvic rhythm.   Mobilization of R anterior shldr complex, R levator, R lat, and combined internal rotators. Performed repeated mobs as needed to reduce muscle tension and increase PROM.                  Supine " stretch to ER@45* shldr abd, ER=70* limited by pain  [place] placed over R elbow to maintain extension while working overhead in shldr flexion in supine.  Flexion limited to ~ 100* by shldr pain  Facilitated Elsa UE shldr flexion and abd with focus on external rotation to address scapulohumeral alignment and PROM overhead as tolerated, though end-range limited by pain.  [place] removed.  Standing elsa UE WB activity while bent over at the waist, supporting upper body on the mat, and reaching for small targets for gravity eliminated reaching above 90* with active shldr flexion.   Removed paddle.    Assessment:     Marko's ROM was decreased from full PROM last session due to an increase in pain and tightness.  Stretching and gentle mobilization was effective at managing pain during activity.  Marko can continue to benefit from skilled OT to address difficulty with functional use of his R UE weakness, and loss of AROM in order to return to valued daily roles and routines.     Patient Education/Response:     Reviewed the frequency of HEP w/ pt (2x/day 10x30s for ER table top stretch, Scap retraction 20 reps 3x/day). Pt v/u.     Plans and Goals:     Review musical activities w/ left hand for participation in meaningful leisure activities that promote cognition and while positioning the R UE for support. Continue to work on full PROM w/ gravity eliminated AROM.    Merlin Silvestre, OTS   7/18/2017    After OTS completed mobilization of hand, OTR placed paddle on pt's hand for duration of treatment to inhibit flexor tone and provide LLPS to long finger flexors.    I certify that I was present in the room directing the student in service delivery and guiding them using my skilled judgment. As the co-signing therapist I have reviewed the students documentation and am responsible for the treatment, assessment, and plan.   Krystle Holloway, MOT, LOTR  7/18/2017

## 2017-07-18 NOTE — PROGRESS NOTES
HPI     Mr. Neptali Gonzalez is here for glaucoma suspect OU and diabetic   eye exam  Diabetic eye screening photos taken 05/23/17: poor image quality and   unable to grade OU, but glaucoma suspect OU.    He reports clear distance vision without glasses and clear near vision   with OTC readers.  Would patient like a refraction today? Yes    (-)drops  (-)flashes  (-)floaters  (-)diplopia    Diabetic (+)  Pt has not been measuring blood glucose at home, he says he will be   getting glucometer later today.  Hemoglobin A1C       Date                     Value               Ref Range             Status                05/05/2017               9.0 (H)             4.5 - 6.2 %           Final             ----------    OCULAR HISTORY  Last Eye Exam 10 years ago  Diabetic eye screening photos taken 05/23/17: poor image quality and   unable to grade OU, but glaucoma suspect OU.  (-)eye surgery     FAMILY HISTORY  (-)Glaucoma       Last edited by Olivia Cruz, OD on 7/18/2017  1:54 PM. (History)            Assessment /Plan     For exam results, see Encounter Report.    Type 2 diabetes mellitus with hyperglycemia, without long-term current use of insulin  Long term current use of oral hypoglycemic drug   No retinopathy noted OU. Continue management of DM as directed by PCP. Monitor with DFE in 1 year, or RTC immediately with any vision changes.     Bilateral presbyopia   Small amount of refractive error OU. Distance glasses optional. Okay to continue with OTC readers.    If pt requests final Rx in the future consider repeat refraction (questionable reliability today because of dry eyes and high blood glucose).     Nuclear sclerotic cataract of both eyes   Not visually significant OU. Monitor.      Bilateral dry eyes   Recommended artificial tears BID-TID OU.    Choroidal nevus, both eyes   Patient educated on findings. Monitor yearly.      Pt is a glaucoma suspect per diabetic eye screening photos on 05/23/17. Pt has  peripapillary atrophy OS. However, normal c/d ratio with healthy rim tissue OU, normal IOP OU (OD 18mmHg, OS 17mmHg), central corneal thickness thicker than average OU (589/603), and no family history of glaucoma. Continue to monitor yearly.      RTC 1 year, or sooner prn

## 2017-07-18 NOTE — PATIENT INSTRUCTIONS
You have dryness of your eyes. Please use over-the-counter artificial tears or lubricants (such as Systane, Refresh, Blink, Genteal), 1 drop in each eye 3-4 times per day. Please avoid drops that advertise redness-relief as these can be unhealthy for your eyes and can cause permanent redness if used long-term.    ==============================================    Your eyes are very healthy; there are no signs of ocular complications from diabetes.  Please continue working with your primary care doctor to manage the diabetes.  It is important you have your eyes checked every year to evaluate the health of your eyes. We will send you a reminder in 1 year for your next examination, or please contact us sooner if you need us.    ==============================================    CATARACT    Symptoms and Signs:  A cataract starts out small, and at first has little effect on your vision. You may notice that your vision is blurred a little, like looking through a cloudy piece of glass or viewing an impressionist painting. A cataract may make light from the sun or a lamp seem too bright or glaring. Or you may notice when you drive at night that the oncoming headlights cause more glare than before. Colors may not appear as bright as they once did.  The type of cataract you have will affect exactly which symptoms you experience and how soon they will occur. When a nuclear cataract first develops it can bring about a temporary improvement in your near vision, called second sight. Unfortunately, the improved vision is short-lived and will disappear as the cataract worsens. Meanwhile, a sub-capsular cataract may not produce any symptoms until it's well-developed.    Causes:  No one knows for sure why the eye's lens changes as we age, forming cataracts. Researchers are gradually identifying factors that may cause cataracts - and information that may help to prevent them.  Many studies suggest that exposure to ultraviolet light is  associated with cataracts, so eye care practitioners recommend wearing sunglasses and a wide-brimmed hat to lessen your exposure.  Other studies suggest people with diabetes are at risk for developing a cataract.   Some eye care practitioners believe that a diet high in antioxidants, such as beta-carotene (vitamin A), selenium and vitamins C and E, may forestall cataracts.  The most important of these is probably vitamin C; it might be helpful to supplement the diet with an extra Vitamin C tablet.  Meanwhile, eating a lot of salt may increase your risk.  Other risk factors include cigarette smoke, air pollution and heavy alcohol consumption.  We simply recommend that you be careful to use sunglasses and to take Vitamin C.    Treatment:  When symptoms begin to appear, we can improve your vision for a while using new glasses, strong bifocals, magnification, appropriate lighting or other visual aids.  This is true in your case; your cataract does not impact your vision very much at this time. If you experience any of the symptoms we described you can return at any time. Otherwise it is fine to see you in 1 year.

## 2017-07-18 NOTE — TELEPHONE ENCOUNTER
Speech therapy and Neuro consult orders placed, could you set up patient with appointments.    Thanks,  MICK

## 2017-07-19 NOTE — PROGRESS NOTES
Diabetes Education  Author: Vivi Geiger RD  Date: 7/19/2017    Diabetes Education Visit  Diabetes Education Record Assessment/Progress: Initial    Diabetes Type  Diabetes Type : Type II    Diabetes History  Diabetes Diagnosis: 0-1 year    Nutrition  Meal Planning:  (drinks only water)  Meal Plan 24 Hour Recall - Breakfast:  (omelette with ham/cheese)  Meal Plan 24 Hour Recall - Lunch:  (sandwich)  Meal Plan 24 Hour Recall - Dinner:  (chicken OR steak with peas, corn)  Meal Plan 24 Hour Recall - Snack:  (no snacking)    Monitoring   Self Monitoring :  (did not have meter)  Blood Glucose Logs: No    Exercise   Exercise Type:  (active at work - no other structured PA)    Current Diabetes Treatment   Current Treatment: Oral Medication (metformin - 500 mg daily)    Social History  Preferred Learning Method: Face to Face  Primary Support: Self    Barriers to Change  Barriers to Change: None  Learning Challenges : None    Readiness to Learn   Readiness to Learn : Acceptance    Cultural Influences  Cultural Influences: No    Diabetes Education Assessment/Progress  Acute Complications (preventing, detecting, and treating acute complications): Discussion, Individual Session, Written Materials Provided, Instructed (Reviewed hypo symptoms and how to treat. Discussed pt not at risk for hypo with current meds.)    Chronic Complications (preventing, detecting, and treating chronic complications): Discussion    Diabetes Disease Process (diabetes disease process and treatment options): Discussion    Nutrition (Incorporating nutritional management into one's lifestyle): Discussion, Individual Session, Written Materials Provided, Instructed (Pt currently eating very minimal carbs based on recall. Discussed carb vs non-carb foods. Discussed appropriate amount of carbs to have with meals/snacks. Encouraged pt to have at least 1 serving carbs with meals. Discussed multiple meal plans amenable to pt.)    Physical Activity (incorporating  physical activity into one's lifestyle): Discussion, Individual Session, Written Materials Provided, Instructed (Encouraged structured activity for 3 hours per week. Reviewed goals and benefits.)    Medications (states correct name, dose, onset, peak, duration, side effects & timing of meds): Discussion, Individual Session, Written Materials Provided, Instructed (Discussed timing, MOA, and side effects of metformin.)    Monitoring (monitoring blood glucose/other parameters & using results): Discussion, Individual Session, Written Materials Provided, Instructed (Instructed pt to SMBG 1-2 times daily. Will send rx for meter to [facility] mail order.)    Goal Setting and Problem Solving (verbalizes behavior change strategies & sets realistic goals): Discussion    Behavior Change (developing personal strategies to health & behavior change): Discussion    Psychosocial Issues (developing personal srategies to address psychosocial concerns): Discussion        Goals  Healthy Eating: Set (Will have 1-3 servings of carb at each meal)  Monitoring: Set (SMBG 1-2 times daily)         Diabetes Care Plan/Intervention  Education Plan/Intervention: Individual Follow-Up DSMT    Diabetes Meal Plan  Restrictions: Restricted Carbohydrate    Education Units of Time   Time Spent: 60 min      Health Maintenance Topics with due status: Not Due       Topic Last Completion Date    Lipid Panel 05/05/2017    Hemoglobin A1c 05/05/2017    Foot Exam 06/27/2017    Eye Exam 07/18/2017    Influenza Vaccine Not Due     Health Maintenance Due   Topic Date Due    TETANUS VACCINE  10/12/1967    Colonoscopy  10/12/1999    Zoster Vaccine  10/12/2009    Pneumococcal (65+) (1 of 2 - PCV13) 10/12/2014

## 2017-07-20 ENCOUNTER — TELEPHONE (OUTPATIENT)
Dept: INTERNAL MEDICINE | Facility: CLINIC | Age: 67
End: 2017-07-20

## 2017-07-20 RX ORDER — INSULIN PUMP SYRINGE, 3 ML
EACH MISCELLANEOUS
Qty: 1 EACH | Refills: 0 | OUTPATIENT
Start: 2017-07-20 | End: 2018-07-18

## 2017-07-20 RX ORDER — LANCETS
1 EACH MISCELLANEOUS DAILY
Qty: 200 EACH | Refills: 3 | Status: SHIPPED | OUTPATIENT
Start: 2017-07-20 | End: 2020-02-05

## 2017-07-20 RX ORDER — LANCETS 28 GAUGE
1 EACH MISCELLANEOUS 2 TIMES DAILY
Qty: 50 EACH | Refills: 11 | OUTPATIENT
Start: 2017-07-20

## 2017-07-20 RX ORDER — INSULIN PUMP SYRINGE, 3 ML
EACH MISCELLANEOUS
Qty: 1 EACH | Refills: 0 | Status: SHIPPED | OUTPATIENT
Start: 2017-07-20 | End: 2019-07-10

## 2017-07-20 NOTE — TELEPHONE ENCOUNTER
Glucometer, test strips, lancets ordered from [employer]    The previous ordered required a PA because the test strips are not covered by [employer].    Thanks,  KJ

## 2017-07-21 ENCOUNTER — CLINICAL SUPPORT (OUTPATIENT)
Dept: REHABILITATION | Facility: HOSPITAL | Age: 67
End: 2017-07-21
Attending: FAMILY MEDICINE
Payer: MEDICARE

## 2017-07-21 DIAGNOSIS — R68.89 IMPAIRED FUNCTION OF UPPER EXTREMITY: Primary | ICD-10-CM

## 2017-07-21 DIAGNOSIS — R29.898 DECREASED GRIP STRENGTH OF RIGHT HAND: ICD-10-CM

## 2017-07-21 DIAGNOSIS — M54.12 CERVICAL RADICULAR PAIN: ICD-10-CM

## 2017-07-21 DIAGNOSIS — R27.8 LOSS OF COORDINATION: ICD-10-CM

## 2017-07-21 DIAGNOSIS — M25.611 STIFFNESS OF SHOULDER JOINT, RIGHT: ICD-10-CM

## 2017-07-21 PROCEDURE — 97140 MANUAL THERAPY 1/> REGIONS: CPT | Mod: PN

## 2017-07-21 PROCEDURE — 97112 NEUROMUSCULAR REEDUCATION: CPT | Mod: PN

## 2017-07-21 PROCEDURE — 97530 THERAPEUTIC ACTIVITIES: CPT | Mod: PN,59

## 2017-07-21 NOTE — PROGRESS NOTES
"OCCUPATIONAL THERAPY PROGRESS NOTE    Date:  07/21/2017    Start Time:  1301  Stop Time:  1401    TIMED  Procedure Time Min.   TherAct (1) Start:  Stop: 15   Manual (1) Start:  Stop: 15   Neuro re-ed (2) Start:  Stop: 30     Total Timed Minutes:  60  Total Timed Units:  4  Total Untimed Units:  0  Charges Billed/# of units:  4    Progress/Current Status    Subjective:     Patient ID: Neptali Gonzalez is a 66 y.o. male.  Diagnosis:   1. Impaired function of upper extremity     2. Stiffness of shoulder joint, right     3. Loss of coordination     4. Decreased  strength of right hand     5. Cervical radicular pain       Pain: 2 /10 during shldr abd, and stretching to ER located a the subacromial space.   "I've got a real busy week at the end of the month so I won't be able to come in."  After reviewing the dates, the pt was referring to the first week in August.     Objective:     Mobilization of metacarpals, carpals, radius, ulna to decrease edema, improve ROM and decrease discomfort with stretching.  Tension noted in the abductor pollicus longus. Gentle stretch to reduce tension and decrease discomfort.   Paddle splint worn for the remainder of the session.   Mobilization and stretching of shoulder complex for lats, pec, anterior complex.  Facilitation of R/L lateral pelvic tilt. Assist to keep R UE at side in position of support. Facilitation of QL for max participation.  Pt played left hand blues cord progressions while seated on the IP piano w/ R hand WB through palm while supporting the exercise sheet.    Seated at the piano, stretched to ER with R UE supported in abd ~60* while bending at the waist.   Standing while WB on a hilo mat, pt retrieved marbles across midline while WB on opposite side. Completed 4 elsa UE circuits.   Removed Paddle.  Gentle facilitation of finger extension and PROM to extension to stimulate neural connection.    Assessment:     Marko enjoyed the piano playing activity today and " his focus improved on subsequent tasks.  He still has some trouble with remembering the sequence of L hand cord progression.  Marko can continue to benefit from skilled OT to address difficulty with functional use of his R UE, weakness, and loss of AROM in order to return to valued daily roles and routines.      Patient Education/Response:     Reviewed HEP, and encouraged pt to hold music with his R hand and WB while playing with his L hand.     Plans and Goals:     Perform EFPT during next session.    Merlin Silvestre, OTS   7/21/2017

## 2017-07-25 ENCOUNTER — CLINICAL SUPPORT (OUTPATIENT)
Dept: REHABILITATION | Facility: HOSPITAL | Age: 67
End: 2017-07-25
Attending: FAMILY MEDICINE
Payer: MEDICARE

## 2017-07-25 DIAGNOSIS — R27.8 LOSS OF COORDINATION: ICD-10-CM

## 2017-07-25 DIAGNOSIS — R29.898 DECREASED GRIP STRENGTH OF RIGHT HAND: ICD-10-CM

## 2017-07-25 DIAGNOSIS — R68.89 IMPAIRED FUNCTION OF UPPER EXTREMITY: Primary | ICD-10-CM

## 2017-07-25 DIAGNOSIS — M25.611 STIFFNESS OF SHOULDER JOINT, RIGHT: ICD-10-CM

## 2017-07-25 PROCEDURE — 97530 THERAPEUTIC ACTIVITIES: CPT | Mod: PN

## 2017-07-25 NOTE — PROGRESS NOTES
"Occupational Therapy    Date:  07/25/2017    Start Time:  1400  Stop Time:  1502      TIMED  Procedure Time Min.   TherAct (4) Start:  Stop: 62     Total Timed Minutes:  62  Total Timed Units:  4  Total Untimed Units:  0  Charges Billed/# of units:  4    Progress/Current Status    Subjective:     Patient ID: Neptali Gonzalez is a 66 y.o. male.  Diagnosis:   1. Impaired function of upper extremity     2. Loss of coordination     3. Decreased  strength of right hand     4. Stiffness of shoulder joint, right       Pain: 0 /10  "I went home and practiced on the piano last week.  It felt pretty good.  I tried a little guitar playing too."  Marko wears reading glasses, but did not bring them to the session.  When he was asked if he could read the instructions on a pill bottle he said, "yes".  When asked how long he had been playing piano, Marko said: "Since I majored in it in college." When he was asked about his medication, he was able to say that he took two pills in the morning and two at night, but struggled to identify them.    Objective:     Performed pill sorting activity by taking 5 "medication"  pill bottles w/ instructions and placing them into a weekly am/pm pill sorter; to address medical management, fine motor coordination, error correction, and cognition. Max A for safety, understand two step instructions, v/c for using affect hand to support, attention to detail.    First pt was given 5 "medication" pill bottles and asked to sort the pills into a weekly organizer, according to instruction on the bottles. Pt was unable to accurately complete task or describe this process.  The activity was downgraded and the pt was given 1 pill bottle and told to divide the pills into the sorter according to instructions written on the bottle (take 2 pills, twice a day).  After the pt had difficulty, the therapist read the instructions out loud, and had the pt re-try.  After the second unsuccessful attempt, the " "therapist demonstrated the process for Sunday, and asked the pt to complete the week.  The pt completed 5/6 days correctly after demo.   Pt was given a bottle w/ simpler instructions (take one pill in the am) and was asked read out the instructions and describe how to sort it. Then the pt was asked to complete it.  He got 7/7 days correct.  The process was repeated w/ the other medications, but he was unable to master more complicated dosing schedules when filling a weekly organizer.   Overall Max A -> Dep for filling a weekly medication organizer.   Pt played basic left hand scales while WB through R palm to support the scales guide.  During the piano playing he had difficulty following a simple cord pattern w/ his L hand (C G F G C) and required a lot of repetition to play w/o looking at the sheet.  Seated elsa UE lift ups w/ hands supported by block for elsa WB through palms, x10.  Sets of 5 performed after R hand played a "C" chord.  His "c" chord w/ his R hand did improve after WB for the second trial, then it began spasming due to fatigue  Pt played piano using a "C" chord w/ R hand for neuro re-ed, x3.    Assessment:     Marko was in a good mood and enjoyed the last session.  During the pill sorting he read the instructions out loud, asked questions, and still had difficulty accurately placing the pills in the correct location.  At this time Marko states that he is able to take current meds as Rx but his difficulty with this activity suggests otherwise.  It is not recommended that marko use a weekly medication sorter to organize his medication, at this time.   Recommend the piano activity be first next session to reduce the frustration and fatigue to improve performance.  Marko can continue to benefit from skilled OT to address difficulty with functional use of his R UE weakness, and loss of AROM in order to return to valued daily roles and routines.     Patient Education/Response:     Play chords on the piano at " home. Start w/ 10x lift ups, before using R hand.  Pt v/u.     Plans and Goals:     Continue to address spasticity w/WB and cognition until Marko can get on the schedule w/ speech.     Merlin Silvestre, OTS   7/25/2017

## 2017-07-31 ENCOUNTER — CLINICAL SUPPORT (OUTPATIENT)
Dept: REHABILITATION | Facility: HOSPITAL | Age: 67
End: 2017-07-31
Attending: FAMILY MEDICINE
Payer: MEDICARE

## 2017-07-31 DIAGNOSIS — R68.89 IMPAIRED FUNCTION OF UPPER EXTREMITY: Primary | ICD-10-CM

## 2017-07-31 DIAGNOSIS — R27.8 LOSS OF COORDINATION: ICD-10-CM

## 2017-07-31 DIAGNOSIS — R41.89 COGNITIVE IMPAIRMENT: Primary | ICD-10-CM

## 2017-07-31 DIAGNOSIS — M25.611 STIFFNESS OF SHOULDER JOINT, RIGHT: ICD-10-CM

## 2017-07-31 DIAGNOSIS — R29.898 DECREASED GRIP STRENGTH OF RIGHT HAND: ICD-10-CM

## 2017-07-31 PROCEDURE — G9168 MEMORY CURRENT STATUS: HCPCS | Mod: CJ,PN

## 2017-07-31 PROCEDURE — 92523 SPEECH SOUND LANG COMPREHEN: CPT | Mod: PN

## 2017-07-31 PROCEDURE — G9169 MEMORY GOAL STATUS: HCPCS | Mod: CI,PN

## 2017-07-31 PROCEDURE — 97530 THERAPEUTIC ACTIVITIES: CPT | Mod: PN

## 2017-07-31 NOTE — PROGRESS NOTES
"Occupational Therapy    Date:  07/31/2017    Start Time:  1300  Stop Time:  1400    PROCEDURES:    TIMED  Procedure Time Min.   TherAct (4) Start:  Stop: 60     Total Timed Minutes:  60  Total Timed Units:  4  Total Untimed Units:  0  Charges Billed/# of units:  4    Progress/Current Status    Subjective:     Patient ID: Neptali Gonzalez is a 66 y.o. male.  Diagnosis:   1. Impaired function of upper extremity     2. Loss of coordination     3. Decreased  strength of right hand     4. Stiffness of shoulder joint, right       Pain: 0 /10  Pt reported no changes in pain or movement of R hand.  "i'm leaving tomorrow to go to [employer] to do presentations over the next 3 days."  When OTS asked about his family Marko replied: "I had a son, but he passed away of cystic fibrosis in 2011.  He had it from 6 months old, and he made it to 20 years old."      Objective:   Pt was wearing his reading glasses today.   Mobilization and stretching of shoulder complex for  lats, pec, anterior delts and combined internal rotators.   Facilitation of A/P pelvic tilt, R/L lateral pelvic tilt. Assist to keep R UE at side in position of support. Facilitation of QL for max participation.  Pt played basic left hand scales while WB through R palm to support the scales and to facilitate forward reach.  During the piano playing he had difficulty following a simple chord pattern w/ his L hand (C G A Bb A G C) but quality and accuracy improved w/ repetition.   Seated elsa UE lift ups w/ hands supported by block for elsa WB through palms, x5.  Sets of 5 performed periodically to distract R hand.    Repeated left hand pattern w/ right hand.  Progressed to piano playing of the same pattern simultaneously w/ both hands.  Quality of movement improved when not focusing on affected side and using both hands.   Seated at the piano, stretched to ER with R UE supported in abd ~60* while bending at the waist.   Standing while WB on a hi-lo mat.  Pt " retrieved 30 marbles, alternating hands while WB on opposite side in a counterclockwise pattern (L->r, bottom -> top, etc). Completed 4 elsa UE circuits  Mobilization of metacarpals, carpals, radius, ulna to decrease edema, improve ROM and decrease discomfort from activities.    Assessment:     Marko was in a good mood today after his speech evaluation.  Marko had a hard time finding the proper notes, in a simple chord pattern w/ his L hand preventing us from progressing. He improved his R hand playing of the scales by using his L hand at the same time for distraction. He also was able to maintain WB w/ R palm to support the scale sheet w/ very few cues.  Marko can continue to benefit from skilled OT to address difficulty with functional use of his R UE weakness, and loss of AROM in order to return to valued daily roles and routines.     Patient Education/Response:     Added piano practice w/ both hands, using the simple scale.     Plans and Goals:     Continue to work on elsa UE tasks w/ significance to Marko.  Progress as tolerated.   Merlin Silvestre, OTS   7/31/2017

## 2017-07-31 NOTE — PLAN OF CARE
"OUTPATIENT REHAB SPEECH THERAPY EVALUATION    Neptali Gonzalez  Primary Diagnosis: Cerebral Infarction (Left Hemisphere)  Treatment Diagnosis: Cognitive Impairment    Referring Provider:  Karen Chacon MD  [id number] [place] [address]     Date: 07/31/2017  Start Time:  12:00 PM  Stop Time:  1:00 PM  Total Timed Minutes:  60 minutes    PROCEDURES: Initial Speech Therapy Evaluation    Past Medical History:   Past Medical History:   Diagnosis Date    Coronary artery disease 2002    stent    Hypertension     Kidney stones     MI (myocardial infarction)      Precautions:  Decreased STM/ safety awareness  Prior Therapy: Patient is currently receiving OT services. No prior ST therapy noted.  Medications: Neptali Gonzalez has a current medication list which includes the following prescription(s): amlodipine, aspirin, atorvastatin, blood sugar diagnostic, blood-glucose meter, carbidopa-levodopa  mg, ketoconazole, lancets, lisinopril, and metformin.  Nutrition: Adequate  Prior Level of Function: Independent  Social History: Patient has been independent with all financial and home management tasks, and lives alone. 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  Signs of Abuse: No  Place of Residence: Patient lives alone in his own home.   Functional Deficits Leading to Referral/Nature of Injury: Patient has exhibited cognitive deficits secondary to cerebral infarction. These deficits are impacting short term memory, higher-level problem solving and safety awareness. Without therapy, these deficits can lead to further cognitive decline, decreased independence, and an overall decreased quality of life.   Patient Therapy Goals:  Patient stated that he wants to be able to return to his PLOF, in which he was able to live independently and operate his businesses safely and " efficiently.    Initial Assessment:  Neptali Gonzalez is a 65 y/o male who presents with mild-moderate cognitive impairment. Deficits noted include decreased short term memory, higher-level problem solving deficits, decreased executive planning and organization, and decreased safety awareness.    SUBJECTIVE:  Patient was friendly, cooperative and is motivated to participate in therapy to improve functional deficits.    OBJECTIVE:  Patient to be treated for cognitive impairment 1x/week for 16 weeks to target STM deficits, higher-level problem solving, and executive function deficits in order to improve safety and overall quality of life.    ASSESSMENT:  Long Term Goals:  1) Patient will improve and demonstrate increased cognitive and executive function skills (planning, organizing, self-awareness, problem solving) during daily activities to maximize safety and function in current living environment.    Short Term Goals:  1) Patient will identify problems and generate 3 solutions to each independently.  2) Patient will organize and sequence 5 step activities with min A with 90% accuracy.  3) Patient will demonstrate recall of functional information following a short-term delay with min A and 90% accuracy.  4) Patient will complete complex reasoning tasks to improve problem solving and safety awareness with 90% accuracy and min A.  5) Patient will complete checkbook/financial balancing tasks with 90% accuracy and min A.  Rehab Potential: good     PLAN:   Certification Period:  07/31/2017 to 11/20/2017  Recommended Treatment Plan: 1 time per week for 16 weeks    Therapist's Name: Rubi Matias MS, CCC-SLP    Therapist's Signature:___________________________________  Date: 07/31/2017    I CERTIFY THE NEED FOR THESE SERVICES FURNISHED UNDER THIS PLAN OF TREATMENT AND WHILE UNDER MY CARE    Physician's comments:  ________________________________________________________________________________________________________________________________________________      Physician's Name: ___________________________________

## 2017-08-07 ENCOUNTER — CLINICAL SUPPORT (OUTPATIENT)
Dept: REHABILITATION | Facility: HOSPITAL | Age: 67
End: 2017-08-07
Attending: FAMILY MEDICINE
Payer: MEDICARE

## 2017-08-07 DIAGNOSIS — R27.8 LOSS OF COORDINATION: ICD-10-CM

## 2017-08-07 DIAGNOSIS — R41.89 COGNITIVE IMPAIRMENT: Primary | ICD-10-CM

## 2017-08-07 DIAGNOSIS — M25.611 STIFFNESS OF SHOULDER JOINT, RIGHT: ICD-10-CM

## 2017-08-07 DIAGNOSIS — R68.89 IMPAIRED FUNCTION OF UPPER EXTREMITY: ICD-10-CM

## 2017-08-07 DIAGNOSIS — R29.898 DECREASED GRIP STRENGTH OF RIGHT HAND: ICD-10-CM

## 2017-08-07 PROCEDURE — G8984 CARRY CURRENT STATUS: HCPCS | Mod: CJ,PN

## 2017-08-07 PROCEDURE — G9168 MEMORY CURRENT STATUS: HCPCS | Mod: CJ,PN

## 2017-08-07 PROCEDURE — 92507 TX SP LANG VOICE COMM INDIV: CPT | Mod: PN,59

## 2017-08-07 PROCEDURE — G9169 MEMORY GOAL STATUS: HCPCS | Mod: CH,PN

## 2017-08-07 PROCEDURE — G8985 CARRY GOAL STATUS: HCPCS | Mod: CI,PN

## 2017-08-07 PROCEDURE — 97530 THERAPEUTIC ACTIVITIES: CPT | Mod: PN

## 2017-08-07 NOTE — PROGRESS NOTES
SLP Outpatient Progress Note    Neptali Gonzalez  Primary Diagnosis: Cerebral Infarction (Left Hemisphere)  Treatment Diagnosis: Cognitive Impairment  Date: 08/07/2017  Start Time:  12:00 PM  Stop Time:  1:00 PM  Total Timed Minutes:  60 minutes     Past Medical History:        Past Medical History:   Diagnosis Date    Coronary artery disease 2002     stent    Hypertension      Kidney stones      MI (myocardial infarction)          SUBJECTIVE:  Patient was alert and cooperative. He participated well with all therapy tasks.     OBJECTIVE:  Patient to be treated for cognitive impairment 1x/week for 16 weeks to target STM deficits, higher-level problem solving, and executive function deficits in order to improve safety and overall quality of life.  Goals addresses this session:  1) Patient will identify problems and generate 3 solutions to each independently.  2) Patient will organize and sequence 5 step activities with min A with 90% accuracy.  3) Patient will demonstrate recall of functional information following a short-term delay with min A and 90% accuracy.     ASSESSMENT:  Progress towards goals addressed during today's session:  1) Patient identified problems utilizing verbal and visual picture scenarios and generated 2 solutions to each with Min A..  2) Patient organized and sequenced 3 step activities with mod A and 80% accuracy.  3) Patient demonstrated recall of functional information following a short-term delay with min A and 60% accuracy.    Rehab Potential: good      PLAN:   Continue current POC  Certification Period:  07/31/2017 to 11/20/2017  Recommended Treatment Plan: 1 time per week for 16 weeks     Therapist's Name: Rubi Matias MS, CCC-SLP  Date: 08/07/2017   1:05 PM

## 2017-08-07 NOTE — PROGRESS NOTES
Occupational Therapy - Treatment and Status Update    Date:  08/07/2017  Start Time:  1305  Stop Time:  1400    TIMED  Procedure Time Min.   TherAct (4) Start:  Stop: 55   Total Timed Minutes:  55  Total Timed Units:  4  Total Untimed Units:  0  Charges Billed/# of units:  4    Progress/Current Status    Subjective:     Patient ID: Neptali Gonzalez is a 66 y.o. male.  Diagnosis:   1. Cognitive impairment     2. Impaired function of upper extremity     3. Loss of coordination     4. Decreased  strength of right hand     5. Stiffness of shoulder joint, right       Pain: 0 /10  Pt reporting difficulty at home due to a pipe-leak resulting from an error while having his house leveled, though he was unable to clearly describe time frames.    Objective:     Mobilization of metacarpals, carpals, radius and ulna to decrease edema and improve movement.   Placed paddle on pt's hand for duration of treatment to inhibit flexor tone and provide LLPS to long finger flexors. Added 70* docking station to inhibit flexor tone and provide LLPS to wrist flexors as well as long finger flexors.   Mobilization of pelvis to improve ant pelvic tilt.  Facilitation of a/p pelvic tilt. Hands at sides in position of support to facilitate scapulo-pelvic rhythm.  Keymar for elbow to provide LLPS and inhibit flexion.  Supine for mobilization of shoulder to improve flex and ER. Unable to range shoulder past 90* flexion. Gentle joint mobs for ant-post gliding, sup-inf gliding. Slight improvement in flexion after mobs w/ facilitation of scap rotation.   Seated AAROM forward and outward reach w/ facilitation of scap rotation.  facilitation of active digit extension following doffing of flat paddle.    Assessment:     Shoulder ROM continues to be limited, though pt did demo some slight improvement after mobilization today. Anticipate that he may have some joint involvement due to long-standing muscular concerns resulting from CVA of unknown  "onset. Mr. Gonzalez can continue to benefit from skilled OT to increase mobility throughout the R UE and improve muscle balance, leading to improved function and therefore, increased independence in valued roles and routines. His reported compliance with HEP has improved since start of care, though no goals have been met as yet.    STG Goals:  Time frame: 6 weeks (7/7/2017 - 8/18/2017)  1) Pt will be report completing HEP 5/7 days a week. (compliance limited by decreased STM, but pt does report increasing compliance and is able to discuss his HEP, which is an improvement from prior sessions)  2) Pt will improve PROM in ER by at least 20* in each measured plane to increase potential function in the R UE. (to be assessed)  3) Pt will complete the EFPT to practice his executive function in real world situations.    4) Pt will lift large "light" objects (less than10#) using elsa UE to waist lvl w/ out stretched arms to increase indep w/ work- related activity  5) Pt will demo picking up off the ground 25# object w/ elsa UE using proper lifting technique    Patient Education/Response:     Reviewed ER stretch at table top, need to relax elbow and engage arm in swing during gait. Pt v/u.    Plans and Goals:     Continue shoulder mobilization followed by CHRISSY.    G-Codes: carry/handle  Current: 20%-40% (CJ)  Goal: 1%-20% (CI)    ALEXANDRE Rojo LOTR  8/7/2017    "

## 2017-08-14 ENCOUNTER — CLINICAL SUPPORT (OUTPATIENT)
Dept: REHABILITATION | Facility: HOSPITAL | Age: 67
End: 2017-08-14
Attending: FAMILY MEDICINE
Payer: MEDICARE

## 2017-08-14 DIAGNOSIS — R41.89 COGNITIVE IMPAIRMENT: Primary | ICD-10-CM

## 2017-08-14 DIAGNOSIS — R68.89 IMPAIRED FUNCTION OF UPPER EXTREMITY: ICD-10-CM

## 2017-08-14 DIAGNOSIS — M25.611 STIFFNESS OF SHOULDER JOINT, RIGHT: ICD-10-CM

## 2017-08-14 DIAGNOSIS — R29.898 DECREASED GRIP STRENGTH OF RIGHT HAND: ICD-10-CM

## 2017-08-14 DIAGNOSIS — R27.8 LOSS OF COORDINATION: ICD-10-CM

## 2017-08-14 PROCEDURE — G9168 MEMORY CURRENT STATUS: HCPCS | Mod: CJ,PN

## 2017-08-14 PROCEDURE — 97530 THERAPEUTIC ACTIVITIES: CPT | Mod: PN

## 2017-08-14 PROCEDURE — 92507 TX SP LANG VOICE COMM INDIV: CPT | Mod: PN

## 2017-08-14 PROCEDURE — G9169 MEMORY GOAL STATUS: HCPCS | Mod: CH,PN

## 2017-08-14 NOTE — PROGRESS NOTES
SLP Outpatient Progress Note     Neptali Gonzalez  Primary Diagnosis: Cerebral Infarction (Left Hemisphere)  Treatment Diagnosis: Cognitive Impairment  Date: 08/14/2017  Start Time:  12:00 PM  Stop Time:  1:00 PM  Total Timed Minutes:  60 minutes     Past Medical History:           Past Medical History:   Diagnosis Date    Coronary artery disease 2002     stent    Hypertension      Kidney stones      MI (myocardial infarction)           SUBJECTIVE:  Patient was alert and cooperative. He participated well with all therapy tasks.     OBJECTIVE:  Patient to be treated for cognitive impairment 1x/week for 16 weeks to target STM deficits, higher-level problem solving, and executive function deficits in order to improve safety and overall quality of life.  Goals addressed this session:  1) Patient will identify problems and generate 3 solutions to each independently.  2) Patient will organize and sequence 5 step activities with min A with 90% accuracy.  3) Patient will demonstrate recall of functional information following a short-term delay with min A and 90% accuracy.     ASSESSMENT:  Progress towards goals addressed during today's session:  1) Patient identified problems utilizing verbal and visual picture scenarios and generated 2 solutions to each with Min A and 60% accuracy.  2) Patient organized and sequenced 4 step activities with mod A and 60% accuracy.  3) Patient demonstrated recall of 3 pieces of information given verbally, following a short-term delay, with min A and 50% accuracy.     Rehab Potential: good      PLAN:   Continue current POC  Certification Period:  07/31/2017 to 11/20/2017  Recommended Treatment Plan: 1 time per week for 16 weeks     Therapist's Name: Rubi Matias MS, CCC-SLP  Date: 08/14/2017   1:06 PM

## 2017-08-16 NOTE — PROGRESS NOTES
"Occupational Therapy    Date:  08/16/2017  Start Time:  1620  Stop Time:  1700    TIMED  Procedure Time Min.   TherAct (3) Start:  Stop: 40   Total Timed Minutes:  40  Total Timed Units:  3  Total Untimed Units:  0  Charges Billed/# of units:  3    Progress/Current Status    Subjective:     Patient ID: Neptali Gonzalez is a 66 y.o. male.  Diagnosis:   1. Cognitive impairment     2. Impaired function of upper extremity     3. Loss of coordination     4. Decreased  strength of right hand     5. Stiffness of shoulder joint, right       Pain: 0 /10  Pt reports no significant concerns today.    Objective:     UBE x5 min forward/ 5 min back w/ elsa UE, level 1.0.  AAROM sh flex w/ facilitation of scap rotation on finger ladder.  Mobilization of shoulder, including levator, anterior complex, lats and combined internal rotators.  Repeated AAROM sh flex.  Mobilization of metacarpals, carpals, radius and ulna to decrease edema and improve movement.   Facilitation of a/p pelvic tilt, R/L lateral pelvic tilt on tilt board w/ 2" fulcrum and yellow t-band resistance. Hands at sides in position of support to facilitate scapulo-pelvic rhythm.    Assessment:     Slight improvement in sh flex AAROM after mobilizations. Marko can continue to benefit from skilled OT to improve R shoulder ROM, active movement and function for increased indep w/ all valued activities.    Patient Education/Response:     Continue stretch to ER on table top. Pt v/u.    Plans and Goals:     Continue sh mobs. Try resisted ER on tilt stick.    Krystle Holloway, MOT, LOTR  8/14/2017    "

## 2017-08-21 ENCOUNTER — CLINICAL SUPPORT (OUTPATIENT)
Dept: REHABILITATION | Facility: HOSPITAL | Age: 67
End: 2017-08-21
Attending: FAMILY MEDICINE
Payer: MEDICARE

## 2017-08-21 DIAGNOSIS — R41.89 COGNITIVE IMPAIRMENT: Primary | ICD-10-CM

## 2017-08-21 DIAGNOSIS — R27.8 LOSS OF COORDINATION: ICD-10-CM

## 2017-08-21 DIAGNOSIS — R29.898 DECREASED GRIP STRENGTH OF RIGHT HAND: ICD-10-CM

## 2017-08-21 DIAGNOSIS — M25.611 STIFFNESS OF SHOULDER JOINT, RIGHT: ICD-10-CM

## 2017-08-21 DIAGNOSIS — R68.89 IMPAIRED FUNCTION OF UPPER EXTREMITY: ICD-10-CM

## 2017-08-21 PROCEDURE — 97530 THERAPEUTIC ACTIVITIES: CPT | Mod: PN

## 2017-08-21 NOTE — PROGRESS NOTES
"Occupational Therapy    Date:  08/21/2017  Start Time:  1310  Stop Time:  1410    TIMED  Procedure Time Min.   TherAct (4) Start:  Stop: 60   Total Timed Minutes:  60  Total Timed Units:  4  Total Untimed Units:  0  Charges Billed/# of units:  4    Progress/Current Status    Subjective:     Patient ID: Npetali Gonzalez is a 66 y.o. male.  Diagnosis:   1. Cognitive impairment     2. Impaired function of upper extremity     3. Loss of coordination     4. Decreased  strength of right hand     5. Stiffness of shoulder joint, right       Pain: 0 /10 at start of session. States hand is doing "about the same." States he wants to focus on the arm today, "get it going again."    Objective:     Facilitation of a/p pelvic tilt. Hands at sides in position of support to facilitate scapulo-pelvic rhythm. Mobilization of metacarpals, carpals, radius and ulna to decrease edema and improve movement. Placed flat paddle on pt's hand for duration of treatment to inhibit flexor tone and provide LLPS to long finger flexors. Added 70* docking station to inhibit flexor tone and provide LLPS to wrist flexors as well as long finger flexors. Mobilization of shoulder, including levator, anterior complex, lats and combined internal rotators. Repeated facilitation of a/p pelvic tilt. Facilitation of active ER seated with tilt stick, using single purple and progressing to double purple rubber-band resistance; multiple trials.  Applied bi-valve to control elbow and keep it extended.  Supine for mobilization of shoulder to improve flex and ER with rotational movements of trunk on scapulae. Pt only able to get to ~90* sh flex. Soft-tissue mobilization of subscapularis w/ pt in L side-lying. Strain-counterstrain for release of subscap supine. Slight improvement after mobilization and release of subscap with sh flex to ~100*. However, pt w/ poor tolerance for distraction and ER during all attempts, as well as poor tolerance for rotational " movements. Gentle joint mobilizations for ant->post gliding, stretching post capsule and sup->inf gliding.  After doffing bivalve, docking station & paddle, OT faciliated active extension of wrist and digits, worked on sustaining this extension during forward reach and worked on sustaining extension/reach during L UE movements.    Assessment:     Mr. Gonzalez continues to demo limited shoulder flexion with tightness in the subscap. He can continue to benefit from skilled OT to address subscap tightness, as well as overall R UE mobility and function for increased indep w/ all activities requiring the R UE, to allow for increased participation in his valued roles and routines.    Patient Education/Response:     Continue stretching for HEP.    Plans and Goals:     Continue mgt of subscapularis.    Krystle Holloway, MOT, LOTR  8/21/2017

## 2017-08-22 ENCOUNTER — TELEPHONE (OUTPATIENT)
Dept: PHYSICAL MEDICINE AND REHAB | Facility: CLINIC | Age: 67
End: 2017-08-22

## 2017-08-22 ENCOUNTER — LAB VISIT (OUTPATIENT)
Dept: LAB | Facility: HOSPITAL | Age: 67
End: 2017-08-22
Attending: INTERNAL MEDICINE
Payer: MEDICARE

## 2017-08-22 ENCOUNTER — DOCUMENTATION ONLY (OUTPATIENT)
Dept: INTERNAL MEDICINE | Facility: CLINIC | Age: 67
End: 2017-08-22

## 2017-08-22 ENCOUNTER — TELEPHONE (OUTPATIENT)
Dept: INTERNAL MEDICINE | Facility: CLINIC | Age: 67
End: 2017-08-22

## 2017-08-22 ENCOUNTER — OFFICE VISIT (OUTPATIENT)
Dept: INTERNAL MEDICINE | Facility: CLINIC | Age: 67
End: 2017-08-22
Payer: MEDICARE

## 2017-08-22 VITALS
HEIGHT: 66 IN | HEART RATE: 64 BPM | BODY MASS INDEX: 25.94 KG/M2 | DIASTOLIC BLOOD PRESSURE: 70 MMHG | OXYGEN SATURATION: 98 % | WEIGHT: 161.38 LBS | SYSTOLIC BLOOD PRESSURE: 119 MMHG

## 2017-08-22 DIAGNOSIS — S46.001D INJURY OF RIGHT ROTATOR CUFF, SUBSEQUENT ENCOUNTER: ICD-10-CM

## 2017-08-22 DIAGNOSIS — E11.8 TYPE 2 DIABETES MELLITUS WITH COMPLICATION, WITHOUT LONG-TERM CURRENT USE OF INSULIN: ICD-10-CM

## 2017-08-22 DIAGNOSIS — I69.351 HEMIPLEGIA AND HEMIPARESIS FOLLOWING CEREBRAL INFARCTION AFFECTING RIGHT DOMINANT SIDE: ICD-10-CM

## 2017-08-22 DIAGNOSIS — E11.69 DYSLIPIDEMIA WITH LOW HIGH DENSITY LIPOPROTEIN (HDL) CHOLESTEROL WITH HYPERTRIGLYCERIDEMIA DUE TO TYPE 2 DIABETES MELLITUS: ICD-10-CM

## 2017-08-22 DIAGNOSIS — E78.2 DYSLIPIDEMIA WITH LOW HIGH DENSITY LIPOPROTEIN (HDL) CHOLESTEROL WITH HYPERTRIGLYCERIDEMIA DUE TO TYPE 2 DIABETES MELLITUS: ICD-10-CM

## 2017-08-22 DIAGNOSIS — Z12.11 COLON CANCER SCREENING: Primary | ICD-10-CM

## 2017-08-22 PROBLEM — S46.001A INJURY OF RIGHT ROTATOR CUFF: Status: ACTIVE | Noted: 2017-08-22

## 2017-08-22 LAB
ANION GAP SERPL CALC-SCNC: 8 MMOL/L
BUN SERPL-MCNC: 20 MG/DL
CALCIUM SERPL-MCNC: 9.8 MG/DL
CHLORIDE SERPL-SCNC: 103 MMOL/L
CO2 SERPL-SCNC: 27 MMOL/L
CREAT SERPL-MCNC: 1.2 MG/DL
EST. GFR  (AFRICAN AMERICAN): >60 ML/MIN/1.73 M^2
EST. GFR  (NON AFRICAN AMERICAN): >60 ML/MIN/1.73 M^2
ESTIMATED AVG GLUCOSE: 180 MG/DL
GLUCOSE SERPL-MCNC: 156 MG/DL
HBA1C MFR BLD HPLC: 7.9 %
POTASSIUM SERPL-SCNC: 4.1 MMOL/L
SODIUM SERPL-SCNC: 138 MMOL/L

## 2017-08-22 PROCEDURE — 83036 HEMOGLOBIN GLYCOSYLATED A1C: CPT

## 2017-08-22 PROCEDURE — 4010F ACE/ARB THERAPY RXD/TAKEN: CPT | Mod: S$GLB,,, | Performed by: INTERNAL MEDICINE

## 2017-08-22 PROCEDURE — 80048 BASIC METABOLIC PNL TOTAL CA: CPT

## 2017-08-22 PROCEDURE — 1159F MED LIST DOCD IN RCRD: CPT | Mod: S$GLB,,, | Performed by: INTERNAL MEDICINE

## 2017-08-22 PROCEDURE — 99215 OFFICE O/P EST HI 40 MIN: CPT | Mod: S$GLB,,, | Performed by: INTERNAL MEDICINE

## 2017-08-22 PROCEDURE — 1125F AMNT PAIN NOTED PAIN PRSNT: CPT | Mod: S$GLB,,, | Performed by: INTERNAL MEDICINE

## 2017-08-22 PROCEDURE — 90670 PCV13 VACCINE IM: CPT | Mod: S$GLB,,, | Performed by: INTERNAL MEDICINE

## 2017-08-22 PROCEDURE — 99999 PR PBB SHADOW E&M-EST. PATIENT-LVL III: CPT | Mod: PBBFAC,,, | Performed by: INTERNAL MEDICINE

## 2017-08-22 PROCEDURE — G0009 ADMIN PNEUMOCOCCAL VACCINE: HCPCS | Mod: S$GLB,,, | Performed by: INTERNAL MEDICINE

## 2017-08-22 PROCEDURE — 3074F SYST BP LT 130 MM HG: CPT | Mod: S$GLB,,, | Performed by: INTERNAL MEDICINE

## 2017-08-22 PROCEDURE — 36415 COLL VENOUS BLD VENIPUNCTURE: CPT

## 2017-08-22 PROCEDURE — 3078F DIAST BP <80 MM HG: CPT | Mod: S$GLB,,, | Performed by: INTERNAL MEDICINE

## 2017-08-22 PROCEDURE — 3045F PR MOST RECENT HEMOGLOBIN A1C LEVEL 7.0-9.0%: CPT | Mod: S$GLB,,, | Performed by: INTERNAL MEDICINE

## 2017-08-22 PROCEDURE — 3008F BODY MASS INDEX DOCD: CPT | Mod: S$GLB,,, | Performed by: INTERNAL MEDICINE

## 2017-08-22 RX ORDER — GLIPIZIDE 5 MG/1
5 TABLET, FILM COATED, EXTENDED RELEASE ORAL
Qty: 90 TABLET | Refills: 3 | Status: SHIPPED | OUTPATIENT
Start: 2017-08-22 | End: 2018-06-25 | Stop reason: SDUPTHER

## 2017-08-22 RX ORDER — LISINOPRIL 5 MG/1
5 TABLET ORAL DAILY
Qty: 90 TABLET | Refills: 3 | Status: CANCELLED | OUTPATIENT
Start: 2017-08-22 | End: 2018-08-22

## 2017-08-22 RX ORDER — AMLODIPINE BESYLATE 10 MG/1
TABLET ORAL
Qty: 90 TABLET | Refills: 3 | Status: SHIPPED | OUTPATIENT
Start: 2017-08-22 | End: 2018-05-01 | Stop reason: SDUPTHER

## 2017-08-22 RX ORDER — LANCETS 33 GAUGE
EACH MISCELLANEOUS
COMMUNITY
Start: 2017-07-21 | End: 2019-07-10

## 2017-08-22 RX ORDER — METFORMIN HYDROCHLORIDE 500 MG/1
500 TABLET, EXTENDED RELEASE ORAL
Qty: 90 TABLET | Refills: 3 | Status: CANCELLED | OUTPATIENT
Start: 2017-08-22 | End: 2018-08-22

## 2017-08-22 NOTE — TELEPHONE ENCOUNTER
The referral is for a shoulder pain work-up, he should not need another referral at this time because he was last seen by Dr Valente on 5/24/17. Dr Valente was performing shoulder injections as treatment for rotator cuff injuries, but these were put on hold for patient to continue his stroke work-up and treatment with Neuro. He is currently doing aggressive OT as directed by Neuro to treat residual affects of his stroke, and is ready to  where Dr Vlaente left off for the rotator cuff injury. Please reschedule him with Dr Valente if that is deemed appropriate.    Thanks,  MICK Chacon MD/MPH  Internal Medicine  [facility]  [phone]

## 2017-08-22 NOTE — ASSESSMENT & PLAN NOTE
2007 stroke at [facility] with residual weakness and apraxia of R hand, large chronic L hemisphere infarction on MRI  · F/U Neuro  · Continue aggressive OT

## 2017-08-22 NOTE — TELEPHONE ENCOUNTER
Patient has been advised that his  A1c is improved to 7.9 while on metformin , but that we still need to add the glipidize to get it controlled (Patient has been informed to please go to his [facility]'s in [place] and  RX) . Also that all of his other labs were normal .

## 2017-08-22 NOTE — PATIENT INSTRUCTIONS
TODAY:  - Appt with Dr Valente (PMR) for shoulder  - pneumonia vaccine  - start glipizide for diabetes  - continue metformin once a day with food  - check glucose daily in the morning, record it on sheet, call in readings  - mail in stool study for colon cancer screening

## 2017-08-22 NOTE — ASSESSMENT & PLAN NOTE
Newly diagnosed 5/5/17, A1c 9  · Tolerating metformin 500mg daily  · Postpone increase due to GFR 47  · Recheck today  · Podiatry exam complete  · Diabetic camera eye screening normal  · Continue ASA, statin  · Start glipizide 5mg

## 2017-08-22 NOTE — ASSESSMENT & PLAN NOTE
Given iFOBT today. Can not remember last colonsocopy, which was normal  FitKit was given to patient on 8/22/2017 9:34 AM

## 2017-08-22 NOTE — ASSESSMENT & PLAN NOTE
MRI 3/2017 showed partial thickness bursal surface tear of the supraspinatus tendon  · F/U PMR  · Consider continuing shoulder injections

## 2017-08-22 NOTE — ASSESSMENT & PLAN NOTE
GFR 47, A1c 9 on 5/5  · Tolerating metformin 500mg XR  · Keep at this dose due to GFR 47  · Monitor kidney function q3 mos  · Check today  · Continue ACE  · Normal urine protein/cr ratio in 5/2017  · Avoid NSAIDs  · Start glipizide
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
## 2017-08-22 NOTE — TELEPHONE ENCOUNTER
Please let patient know that his A1c is improved to 7.9 with the metformin, but we still need to add the glipizide to get it controlled. His labs were otherwise normal.    Thanks,  KJ

## 2017-08-23 ENCOUNTER — OFFICE VISIT (OUTPATIENT)
Dept: PHYSICAL MEDICINE AND REHAB | Facility: CLINIC | Age: 67
End: 2017-08-23
Payer: MEDICARE

## 2017-08-23 VITALS
BODY MASS INDEX: 25.96 KG/M2 | WEIGHT: 161.5 LBS | HEIGHT: 66 IN | SYSTOLIC BLOOD PRESSURE: 115 MMHG | DIASTOLIC BLOOD PRESSURE: 72 MMHG | HEART RATE: 69 BPM

## 2017-08-23 DIAGNOSIS — M25.511 RIGHT SHOULDER PAIN, UNSPECIFIED CHRONICITY: Primary | ICD-10-CM

## 2017-08-23 PROCEDURE — 76942 ECHO GUIDE FOR BIOPSY: CPT | Mod: S$GLB,,, | Performed by: PHYSICAL MEDICINE & REHABILITATION

## 2017-08-23 PROCEDURE — 99999 PR PBB SHADOW E&M-EST. PATIENT-LVL III: CPT | Mod: PBBFAC,,, | Performed by: PHYSICAL MEDICINE & REHABILITATION

## 2017-08-23 PROCEDURE — 3078F DIAST BP <80 MM HG: CPT | Mod: S$GLB,,, | Performed by: PHYSICAL MEDICINE & REHABILITATION

## 2017-08-23 PROCEDURE — 3074F SYST BP LT 130 MM HG: CPT | Mod: S$GLB,,, | Performed by: PHYSICAL MEDICINE & REHABILITATION

## 2017-08-23 PROCEDURE — 1125F AMNT PAIN NOTED PAIN PRSNT: CPT | Mod: S$GLB,,, | Performed by: PHYSICAL MEDICINE & REHABILITATION

## 2017-08-23 PROCEDURE — 64450 NJX AA&/STRD OTHER PN/BRANCH: CPT | Mod: S$GLB,,, | Performed by: PHYSICAL MEDICINE & REHABILITATION

## 2017-08-23 PROCEDURE — 1159F MED LIST DOCD IN RCRD: CPT | Mod: S$GLB,,, | Performed by: PHYSICAL MEDICINE & REHABILITATION

## 2017-08-23 PROCEDURE — 3008F BODY MASS INDEX DOCD: CPT | Mod: S$GLB,,, | Performed by: PHYSICAL MEDICINE & REHABILITATION

## 2017-08-23 PROCEDURE — 99214 OFFICE O/P EST MOD 30 MIN: CPT | Mod: 25,S$GLB,, | Performed by: PHYSICAL MEDICINE & REHABILITATION

## 2017-08-23 RX ORDER — LIDOCAINE HYDROCHLORIDE 10 MG/ML
5 INJECTION INFILTRATION; PERINEURAL ONCE
Status: COMPLETED | OUTPATIENT
Start: 2017-08-23 | End: 2017-08-23

## 2017-08-23 RX ADMIN — LIDOCAINE HYDROCHLORIDE 5 ML: 10 INJECTION INFILTRATION; PERINEURAL at 12:08

## 2017-08-23 NOTE — PROGRESS NOTES
HPI:  Patient is a 66 y.o. year old male w. Severe right shoulder pain limiting his therapy. He is having trouble moving the arm in any direction , specially over his head.    Imaging    Right shoulder 3 views    Skeletal structures are osteopenic.  There are degenerative changes.  There is no acute fracture or dislocation   Impression         Osteopenia and degenerative change     MRI CERVICAL SPINE WITHOUT CONTRAST    COMPARISON:  None    TECHNIQUE:  Sagittal T1, T2, and STIR;  axial T2 and 3D GRE sequences through the cervical spine were acquired without contrast.      FINDINGS:    There is a mild bursal of the usual cervical lordosis in the mid cervical spine and there is very slight posterior positioning of C5 relative to C6 by 1-2 mm most likely on a degenerative basis.  There is no abnormal marrow signal suggesting acute fracture or osseous destruction    C2-C3: No disc protrusion or spinal canal or neural foramen narrowing    C3-C4: Uncovertebral spurring, mild broad posterior disc bulge, just mild impression on the thecal sac, moderate bilateral neural foramen narrowing    C4-C5: Posterior midline disc protrusion with near complete if not complete effacement of the thecal sac. Mild uncovertebral spurring.  Mild bilateral neural foramen narrowing    C5-C6: Uncovertebral spurring, mild retrolisthesis, broad posterior disc bulge, moderate bilateral neural foramen narrowing, mild impression of the thecal sac    C6-C7: Facet arthropathy, uncovertebral spurring, posterior left paracentral disc protrusion in contact with the anterior margin of the spinal cord without appreciable deformity of the spinal cord or abnormal signal in the spinal cord.  Moderate severe left, moderate right neural foramen narrowing    C7-T1: No spinal canal or neural foramen narrowing    The cervical spinal cord is intrinsically normal in appearance and craniocervical junction is normal in appearance   Impression         C6-C7 there is  mild spinal canal narrowing with facet arthropathy, posterior osteophytes and disc protrusion with the posterior margin of the disc in contact with the anterior margin of the spinal cord.    To a lesser degree there is mild spinal canal narrowing C4-C5, C3-C4, C5-C6.  Multilevel neural foramen narrowing as described         Labs  hgabic 7.9  Egfr, cr nl  Gluc 156 elev      Past Medical History:   Diagnosis Date    Coronary artery disease 2002    stent    Hypertension     Kidney stones     MI (myocardial infarction)      Past Surgical History:   Procedure Laterality Date    CARDIAC SURGERY      CORONARY STENT PLACEMENT      LITHOTRIPSY      TONSILLECTOMY       Family History   Problem Relation Age of Onset    Diabetes Mother     Dementia Mother     Other Father     Cystic fibrosis Son     Amblyopia Neg Hx     Blindness Neg Hx     Cataracts Neg Hx     Glaucoma Neg Hx     Macular degeneration Neg Hx     Retinal detachment Neg Hx     Strabismus Neg Hx      Social History     Social History    Marital status:      Spouse name: N/A    Number of children: N/A    Years of education: N/A     Social History Main Topics    Smoking status: Former Smoker     Packs/day: 1.00     Years: 3.00     Start date: 1/1/1967    Smokeless tobacco: Never Used    Alcohol use No      Comment: OCC    Drug use: No    Sexual activity: Yes     Partners: Male     Birth control/ protection: Condom     Other Topics Concern    Not on file     Social History Narrative    No narrative on file       Review of patient's allergies indicates:  No Known Allergies    Current Outpatient Prescriptions:     amlodipine (NORVASC) 10 MG tablet, TAKE 1 TABLET(10 MG) BY MOUTH EVERY DAY, Disp: 90 tablet, Rfl: 3    aspirin (ECOTRIN) 81 MG EC tablet, Take 81 mg by mouth once daily., Disp: , Rfl:     atorvastatin (LIPITOR) 40 MG tablet, Take 1 tablet (40 mg total) by mouth once daily., Disp: 90 tablet, Rfl: 11    blood sugar  "diagnostic (ACCU-CHEK RODRIGO) Strp, 1 strip by Misc.(Non-Drug; Combo Route) route once daily., Disp: 200 strip, Rfl: 3    blood-glucose meter kit, Use as instructed, Disp: 1 each, Rfl: 0    glipiZIDE (GLUCOTROL) 5 MG TR24, Take 1 tablet (5 mg total) by mouth daily with breakfast., Disp: 90 tablet, Rfl: 3    ketoconazole (NIZORAL) 2 % cream, Apply topically once daily. toes, Disp: 30 g, Rfl: 1    lancets Misc, 1 lancet by Misc.(Non-Drug; Combo Route) route once daily at 6am., Disp: 200 each, Rfl: 3    lisinopril (PRINIVIL,ZESTRIL) 5 MG tablet, Take 1 tablet (5 mg total) by mouth once daily., Disp: 90 tablet, Rfl: 3    metformin (GLUCOPHAGE-XR) 500 MG 24 hr tablet, Take 1 tablet (500 mg total) by mouth daily with breakfast., Disp: 90 tablet, Rfl: 3    TRUEPLUS LANCETS 33 gauge Misc, , Disp: , Rfl:           Review of Systems  No nausea, vomiting, fevers, Chills , contipation, diarrhea or sweats    Physical Exam:      Vitals:    08/23/17 1049   BP: 115/72   Pulse: 69     alert and oriented ×4 follows commands answers all questions appropriately,affect wnl  Manual muscle test 5 out of 5 sensation EXCEPT right UE weakest at  SHOULDER ABDUCTORS AND FWD FLEXORS to light touch grossly intact  +resting tremors of right arm  Nl gait  - ROM IN ALL DIRECTIONS TO 30-60DEG  No C/C/E    Assessment:  SHOULDER OA DEVELOPING FROZEN SHOULDER  PROBABLE RTC PATHOLOGY  Supraspinatus tear right in previous mri  DM2 suboptimal  H/o cva w. Right hemiparesis  Plan:  Suprascapular nerve block+hydrodissection today  Discussed prp to right shoulder, he will research it first        pROCEDURE NOTE: risk and benefit of right suprascapular NERVE BLOCK AND HYDRODISEECTION injection reviewed with. patient. Verbal consent was obtained. Injection was performed after sterile prep w. Betadine. MEDIAL TO LATERAL approach used. PT. WAS IN A SIDE LAYING POSITION. INJECTION WAS PERFORMED ALONG  EDGE OF SPINOGLENOID NOTCH. 21g 2" needle used " hydrodissecting along SPINOGLENOID NOTCH.     Ultrasound guidance was utilized for needle visualization. A TOTAL OF 5ML OF LIDOCAINE 1% AND 5ML OF STERILE NORMAL SALINE WAS UTILIZED. No complications. iMMEDIATE IMPROVEMENT OF SHOULDER PAIN POST    Ultrasound interpretation was performed prior to the procedure to identify the target and any adjacent neurovascular structures.  Subsequently, interpretation was performed during real- time needle guidance confirming placement. Post- intervention interpretation was also performed confirming appropriate injectate flow and hemostasis.  Images indicating needle placement have been saved in [facility].

## 2017-08-30 ENCOUNTER — CLINICAL SUPPORT (OUTPATIENT)
Dept: REHABILITATION | Facility: HOSPITAL | Age: 67
End: 2017-08-30
Attending: FAMILY MEDICINE
Payer: MEDICARE

## 2017-08-30 DIAGNOSIS — M25.611 STIFFNESS OF SHOULDER JOINT, RIGHT: ICD-10-CM

## 2017-08-30 DIAGNOSIS — R68.89 IMPAIRED FUNCTION OF UPPER EXTREMITY: Primary | ICD-10-CM

## 2017-08-30 DIAGNOSIS — R27.8 LOSS OF COORDINATION: ICD-10-CM

## 2017-08-30 DIAGNOSIS — R29.898 DECREASED GRIP STRENGTH OF RIGHT HAND: ICD-10-CM

## 2017-08-30 PROCEDURE — 97530 THERAPEUTIC ACTIVITIES: CPT | Mod: PN

## 2017-08-30 NOTE — PROGRESS NOTES
"Occupational Therapy    Date:  08/30/2017  Start Time:  1610  Stop Time:  1705    TIMED  Procedure Time Min.   TherAct (4) Start:  Stop: 55   Total Timed Minutes:  55  Total Timed Units:  4  Total Untimed Units:  0  Charges Billed/# of units:  4    Progress/Current Status    Subjective:     Patient ID: Neptali Gonzalez is a 66 y.o. male.  Diagnosis:   1. Impaired function of upper extremity     2. Loss of coordination     3. Decreased  strength of right hand     4. Stiffness of shoulder joint, right       Pain: 0 /10  Pt states that he has been doing "the one where I lean forward" and is able to demo approximate positioning for stretch. Pt reports that MD is wanting to give botox, but his copay is $700, which he does not have.    Objective:     Mobilization of metacarpals, carpals, radius and ulna to decrease edema and improve movement.  Inhibition of tone in wrist, hand and finger flexors and forearm. Placed flat paddle on pt's hand for duration of treatment to inhibit flexor tone and provide LLPS to long finger flexors.   Facilitation of a/p pelvic tilt, R/L lateral pelvic tilt. Hands at sides in position of support to facilitate scapulo-pelvic rhythm.   Mobilization of pelvis to improve lateral (R & L) pelvic tilt.   Mobilization of shoulder, including levator, anterior complex, lats and combined internal rotators.   Supine for mobilization of shoulder to improve flex and ER with rotational movements of trunk on scapulae. Also perf'd gentle joint mobs for A->P and S->I glides. Strain-counterstrain for release of subscapularis during support of shoulder at ~90* flexion. Used bivalve for elbow support. Able to flex shoulder to about 100*, but then pt w/ c/o pain. Slight improvements after strain-counterstrain release and joint mobs.  Facilitation of sit<>stand w/ L foot on block to encouraged increased weight shift to the right, assist to coordinate hip and knee extension.    Assessment:     Marko monteiro " tight throughout the pelvis and shoulder, especially in lats and subscapularis. He can continue to benefit from skilled OT to increase mobility and function throughout the R UE, allowing for increased indep and safety in his desired activities.    Patient Education/Response:     Continue stretching.    Plans and Goals:     Continue shoulder mobilization as tolerated, fxl use R UE.    ALEXANDRE Rojo, LOTR  8/30/2017

## 2017-09-11 ENCOUNTER — CLINICAL SUPPORT (OUTPATIENT)
Dept: REHABILITATION | Facility: HOSPITAL | Age: 67
End: 2017-09-11
Attending: INTERNAL MEDICINE
Payer: MEDICARE

## 2017-09-11 DIAGNOSIS — M25.611 STIFFNESS OF SHOULDER JOINT, RIGHT: ICD-10-CM

## 2017-09-11 DIAGNOSIS — R41.89 COGNITIVE IMPAIRMENT: ICD-10-CM

## 2017-09-11 DIAGNOSIS — R41.89 COGNITIVE IMPAIRMENT: Primary | ICD-10-CM

## 2017-09-11 DIAGNOSIS — R27.8 LOSS OF COORDINATION: ICD-10-CM

## 2017-09-11 DIAGNOSIS — R29.898 DECREASED GRIP STRENGTH OF RIGHT HAND: ICD-10-CM

## 2017-09-11 DIAGNOSIS — R68.89 IMPAIRED FUNCTION OF UPPER EXTREMITY: Primary | ICD-10-CM

## 2017-09-11 PROCEDURE — G9168 MEMORY CURRENT STATUS: HCPCS | Mod: CJ,PN

## 2017-09-11 PROCEDURE — 92507 TX SP LANG VOICE COMM INDIV: CPT | Mod: PN

## 2017-09-11 PROCEDURE — G8984 CARRY CURRENT STATUS: HCPCS | Mod: CJ,PN

## 2017-09-11 PROCEDURE — 97530 THERAPEUTIC ACTIVITIES: CPT | Mod: PN,59

## 2017-09-11 PROCEDURE — G9169 MEMORY GOAL STATUS: HCPCS | Mod: CI,PN

## 2017-09-11 PROCEDURE — G8985 CARRY GOAL STATUS: HCPCS | Mod: CI,PN

## 2017-09-11 NOTE — PROGRESS NOTES
SLP Outpatient Progress Note     Neptali Gonzalez  Primary Diagnosis: Cerebral Infarction (Left Hemisphere)  Treatment Diagnosis: Cognitive Impairment  Date: 09/11/2017  Start Time:  1:00 PM  Stop Time:  2:00 PM  Total Timed Minutes:  60 minutes     Past Medical History:           Past Medical History:   Diagnosis Date    Coronary artery disease 2002     stent    Hypertension      Kidney stones      MI (myocardial infarction)           SUBJECTIVE:  Patient was friendly and cooperated well with all tasks.      OBJECTIVE:  Patient to be treated for cognitive impairment 1x/week for 16 weeks to target STM deficits, higher-level problem solving, and executive function deficits in order to improve safety and overall quality of life.  Goals addressed this session:  1) Patient will identify problems and generate 3 solutions to each independently.  2) Patient will organize and sequence 5 step activities with min A with 90% accuracy.  3) Patient will demonstrate recall of functional information following a short-term delay with min A and 90% accuracy.     ASSESSMENT:  Progress towards goals addressed during today's session:  1) Patient identified problems utilizing verbal and visual picture scenarios and generated 2 solutions to each with Min A and 75% accuracy.  2) Patient organized and sequenced 4 step activities with mod A and 50% accuracy.  3) Patient demonstrated recall of 3 pieces of information given verbally, following a short-term delay, with min A and 50% accuracy.     Rehab Potential: good      PLAN:   Continue current POC  Certification Period:  07/31/2017 to 11/20/2017  Recommended Treatment Plan: 1 time per week for 16 weeks     Therapist's Name: Rubi Matias MS, CCC-SLP  Date: 09/11/2017

## 2017-09-11 NOTE — PROGRESS NOTES
Occupational Therapy - Treatment and Status Update  Date:  09/11/2017    Start Time:  1205  Stop Time:  1250      TIMED  Procedure Time Min.   TherAct (3) Start:  Stop: 45   Total Timed Minutes:  45  Total Timed Units:  3  Total Untimed Units:  0  Charges Billed/# of units:  3    Progress/Current Status    Subjective:     Patient ID: Neptali Gonzalez is a 66 y.o. male.  Diagnosis:   1. Impaired function of upper extremity     2. Loss of coordination     3. Decreased  strength of right hand     4. Stiffness of shoulder joint, right     5. Cognitive impairment       Pain: 0 /10  Pt asks about whether he should be working. OT referred him to MD.    Objective:     Marko gonzalez'd ability to lift 10# box with elsa UE mat<>table x2 and mat<>floor x2 w/ min cues for body mechanics.  Stretching to R hand/wrist w/ hand flat on mat, 10x10s.  Demo'd ability to lift 20# box with elsa UE mat<>table x2 and mat<>floor x3 w/ min cues for body mechanics.  Repeated stretching to R hand/wrist w/ hand flat on mat, 10x10s.  Mobilization of shoulder, including levator, anterior complex, lats and combined internal rotators.  Release of pec minor. Strain-counterstrain for release of subscap.  PROM ER@0*abd=47, ER@45*abd=44, ER@90*abd= NT. Sh flex=113p, 104p   Use of R UE for support, holding R UE to the front at various angles during L UE fxl task for 60s intervals x5.  Facilitation of active sh flexion, supine w/ wheel.  Lat stretch on table; stretch to ER on table.    Assessment:     This is pt's 1st visit since beginning of September for status update. Overall, Marko appears to be making small improvements in function, but demo's no significant change in ROM despite ed and review of HEP. He has made progress toward 3/5 LTG's. Marko can continue to make progress toward OT goals by improving muscle balance throughout the R side, lengthening and strengthening where appropriate and improving function to allow increased independence and  "participation in his valued activities.    LTG GOALS:  Time frame: 12 weeks (7/7/2017-9/29/2017)  1) Pt will be able to keep the right hand open during overhead reaching w/o cuing (progressing)  2) Pt will be able to use the R UE to lift large "light" objects (10#) requiring 2 hands to chest height to increase indep w/ work-related activity. (progressing)  3) Pt will be able to sign his name legibly with modified technique/ built-up pen as necessary. (not addressed)  4) Pt will demo >3/4 AROM throughout the R UE for increased function. (no change)  5) Pt will demo picking up a 25# weight w/ elsa UE to waist height w/ out stretched arms to increase indep w/ work- related activity (progressing)     Patient Education/Response:     Reviewed HEP for stretches, gave new handout. Pt v/u.    Plans and Goals:     Review HEP. Continue mobilization and facilitation of active movement as tolerated.    Krystle Holloway, ALEXANDRE, LOTR  9/11/2017    "

## 2017-09-18 ENCOUNTER — CLINICAL SUPPORT (OUTPATIENT)
Dept: REHABILITATION | Facility: HOSPITAL | Age: 67
End: 2017-09-18
Attending: INTERNAL MEDICINE
Payer: MEDICARE

## 2017-09-18 DIAGNOSIS — M25.611 STIFFNESS OF SHOULDER JOINT, RIGHT: ICD-10-CM

## 2017-09-18 DIAGNOSIS — R41.89 COGNITIVE IMPAIRMENT: Primary | ICD-10-CM

## 2017-09-18 DIAGNOSIS — R29.898 DECREASED GRIP STRENGTH OF RIGHT HAND: ICD-10-CM

## 2017-09-18 DIAGNOSIS — R68.89 IMPAIRED FUNCTION OF UPPER EXTREMITY: Primary | ICD-10-CM

## 2017-09-18 DIAGNOSIS — R27.8 LOSS OF COORDINATION: ICD-10-CM

## 2017-09-18 PROCEDURE — G9169 MEMORY GOAL STATUS: HCPCS | Mod: CI,PN

## 2017-09-18 PROCEDURE — 97530 THERAPEUTIC ACTIVITIES: CPT | Mod: PN

## 2017-09-18 PROCEDURE — G9168 MEMORY CURRENT STATUS: HCPCS | Mod: CJ,PN

## 2017-09-18 PROCEDURE — 92507 TX SP LANG VOICE COMM INDIV: CPT | Mod: PN

## 2017-09-18 NOTE — PROGRESS NOTES
"Occupational Therapy    Date:  09/18/2017  Start Time:  1320  Stop Time:  1420    TIMED  Procedure Time Min.   TherAct (4) Start:  Stop: 60   Total Timed Minutes:  60  Total Timed Units:  4  Total Untimed Units:  0  Charges Billed/# of units:  4    Progress/Current Status    Subjective:     Patient ID: Neptali Gonzalez is a 66 y.o. male.  Diagnosis:   1. Impaired function of upper extremity     2. Loss of coordination     3. Decreased  strength of right hand     4. 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 He said that he was glad we worked on lifting the 20# box at last visit because it "gave me strength."  At end of session, he asks, "am I getting any better?"    Objective:     Stretch to ER on table, lats to front, lats to side, ER @ door, 10x15s each.  Mobilization of shoulder, including levator, anterior complex, lats and combined internal rotators.  PROM ER@0*abd= 48 , ER@45*abd= 50, ER@90*abd= NT.  Stretch to ER@45*abd, 10x30s. Stretch to ER@~80*, 5x30s.  Supine for mobilization of shoulder to improve flex and ER with rotational movements of trunk on scapulae. PROM sh flex=110.  Facilitation of active shoulder flexion supine and seated.    Assessment:     Marko can continue to make progress toward OT goals by improving muscle balance throughout the R side, lengthening and strengthening where appropriate and improving function to allow increased independence and participation in his valued activities.    Patient Education/Response:     Continue HEP.    Plans and Goals:     Continue mobilization and facilitation of active movement as tolerated.    Krystle Holloway, ALEXANDRE, LOTR  9/19/2017      "

## 2017-09-18 NOTE — PROGRESS NOTES
SLP Outpatient Progress Note     Neptali Gonzalez  Primary Diagnosis: Cerebral Infarction (Left Hemisphere)  Treatment Diagnosis: Cognitive Impairment  Date: 09/18/2017  Start Time:  12:00 PM  Stop Time:  1:00 PM  Total Timed Minutes:  60 minutes     Past Medical History:           Past Medical History:   Diagnosis Date    Coronary artery disease 2002     stent    Hypertension      Kidney stones      MI (myocardial infarction)           SUBJECTIVE:  Patient was friendly and cooperated well with all tasks.      OBJECTIVE:  Patient to be treated for cognitive impairment 1x/week for 16 weeks to target STM deficits, higher-level problem solving, and executive function deficits in order to improve safety and overall quality of life.  Goals addressed this session:  1) Patient will identify problems and generate 3 solutions to each independently.  2) Patient will organize and sequence 5 step activities with min A with 90% accuracy.  3) Patient will demonstrate recall of functional information following a short-term delay with min A and 90% accuracy.     ASSESSMENT:  Progress towards goals addressed during today's session:  1) Patient identified problems utilizing verbal and visual picture scenarios and generated 3 solutions to each with Mod A and 50% accuracy.  2) Patient organized and sequenced 4 step activities with mod A and 75% accuracy.  3) Patient demonstrated recall of 3 pieces of information given verbally, following a short-term delay, with min A and 60% accuracy.     Rehab Potential: good      PLAN:   Continue current POC  Certification Period:  07/31/2017 to 11/20/2017  Recommended Treatment Plan: 1 time per week for 16 weeks     Therapist's Name: Rubi Matias MS, CCC-SLP  Date: 09/18/2017

## 2017-09-20 ENCOUNTER — OFFICE VISIT (OUTPATIENT)
Dept: PHYSICAL MEDICINE AND REHAB | Facility: CLINIC | Age: 67
End: 2017-09-20
Payer: MEDICARE

## 2017-09-20 VITALS
WEIGHT: 161.38 LBS | DIASTOLIC BLOOD PRESSURE: 74 MMHG | HEIGHT: 66 IN | SYSTOLIC BLOOD PRESSURE: 127 MMHG | HEART RATE: 59 BPM | BODY MASS INDEX: 25.94 KG/M2

## 2017-09-20 DIAGNOSIS — M25.511 RIGHT SHOULDER PAIN, UNSPECIFIED CHRONICITY: Primary | ICD-10-CM

## 2017-09-20 PROCEDURE — 1159F MED LIST DOCD IN RCRD: CPT | Mod: S$GLB,,, | Performed by: PHYSICAL MEDICINE & REHABILITATION

## 2017-09-20 PROCEDURE — 3078F DIAST BP <80 MM HG: CPT | Mod: S$GLB,,, | Performed by: PHYSICAL MEDICINE & REHABILITATION

## 2017-09-20 PROCEDURE — 99214 OFFICE O/P EST MOD 30 MIN: CPT | Mod: S$GLB,,, | Performed by: PHYSICAL MEDICINE & REHABILITATION

## 2017-09-20 PROCEDURE — 1125F AMNT PAIN NOTED PAIN PRSNT: CPT | Mod: S$GLB,,, | Performed by: PHYSICAL MEDICINE & REHABILITATION

## 2017-09-20 PROCEDURE — 3008F BODY MASS INDEX DOCD: CPT | Mod: S$GLB,,, | Performed by: PHYSICAL MEDICINE & REHABILITATION

## 2017-09-20 PROCEDURE — 99999 PR PBB SHADOW E&M-EST. PATIENT-LVL III: CPT | Mod: PBBFAC,,, | Performed by: PHYSICAL MEDICINE & REHABILITATION

## 2017-09-20 PROCEDURE — 3074F SYST BP LT 130 MM HG: CPT | Mod: S$GLB,,, | Performed by: PHYSICAL MEDICINE & REHABILITATION

## 2017-09-20 NOTE — PROGRESS NOTES
HPI:  Patient is a 66 y.o. year old male w. Right sided arm weakness d/t stroke. He is following up for right shouler pain. Last eval I performed a suprascap. Nerve block+hydrodissection w. Great improvement.(90% better painwise) He is finishing his OT and his rom of his shoulder has not yet improved. His mri did show a supraspinatus tear and djd. He has researched prp and would like to schedule this.    Imaging    TECHNIQUE: Axial PD FS, sagittal and coronal T2 FS, and sagittal and coronal T1 sequences through the RIGHT shoulder were acquired without contrast.    FINDINGS:    ROTATOR CUFF:  There is a partial-thickness bursal surface tear of the supraspinatus tendon involving greater than 50% of the tendon thickness and measuring approximately 2.3 x 2.0 cm AP by transverse.  Mild tendinosis of the infraspinatus and subscapularis tendons is also present.    LABRUM:  The labrum is diffusely degenerated.    LONG HEAD BICEPS TENDON:  Intact and located.    BONES:  No fracture or contusion. Moderate acromioclavicular joint arthrosis is present.    MUSCLES:  Rotator cuff muscle bulk is well maintained.    MISC:  No joint effusion.   Impression       Partial thickness bursal surface tear of the supraspinatus tendon     Labs  hgba1c 7.9 from 9  egfr dep 47, cr 1.5, gluc 246    Past Medical History:   Diagnosis Date    Coronary artery disease 2002    stent    Hypertension     Kidney stones     MI (myocardial infarction)      Past Surgical History:   Procedure Laterality Date    CARDIAC SURGERY      CORONARY STENT PLACEMENT      LITHOTRIPSY      TONSILLECTOMY       Family History   Problem Relation Age of Onset    Diabetes Mother     Dementia Mother     Other Father     Cystic fibrosis Son     Amblyopia Neg Hx     Blindness Neg Hx     Cataracts Neg Hx     Glaucoma Neg Hx     Macular degeneration Neg Hx     Retinal detachment Neg Hx     Strabismus Neg Hx      Social History     Social History    Marital  status:      Spouse name: N/A    Number of children: N/A    Years of education: N/A     Social History Main Topics    Smoking status: Former Smoker     Packs/day: 1.00     Years: 3.00     Start date: 1/1/1967    Smokeless tobacco: Never Used    Alcohol use No      Comment: OCC    Drug use: No    Sexual activity: Yes     Partners: Male     Birth control/ protection: Condom     Other Topics Concern    Not on file     Social History Narrative    No narrative on file       Review of patient's allergies indicates:  No Known Allergies    Current Outpatient Prescriptions:     amlodipine (NORVASC) 10 MG tablet, TAKE 1 TABLET(10 MG) BY MOUTH EVERY DAY, Disp: 90 tablet, Rfl: 3    aspirin (ECOTRIN) 81 MG EC tablet, Take 81 mg by mouth once daily., Disp: , Rfl:     atorvastatin (LIPITOR) 40 MG tablet, Take 1 tablet (40 mg total) by mouth once daily., Disp: 90 tablet, Rfl: 11    blood sugar diagnostic (ACCU-CHEK RODRIGO) Strp, 1 strip by Misc.(Non-Drug; Combo Route) route once daily., Disp: 200 strip, Rfl: 3    blood-glucose meter kit, Use as instructed, Disp: 1 each, Rfl: 0    glipiZIDE (GLUCOTROL) 5 MG TR24, Take 1 tablet (5 mg total) by mouth daily with breakfast., Disp: 90 tablet, Rfl: 3    ketoconazole (NIZORAL) 2 % cream, Apply topically once daily. toes, Disp: 30 g, Rfl: 1    lancets Misc, 1 lancet by Misc.(Non-Drug; Combo Route) route once daily at 6am., Disp: 200 each, Rfl: 3    lisinopril (PRINIVIL,ZESTRIL) 5 MG tablet, Take 1 tablet (5 mg total) by mouth once daily., Disp: 90 tablet, Rfl: 3    metformin (GLUCOPHAGE-XR) 500 MG 24 hr tablet, Take 1 tablet (500 mg total) by mouth daily with breakfast., Disp: 90 tablet, Rfl: 3    TRUEPLUS LANCETS 33 gauge Misc, , Disp: , Rfl:           Review of Systems  No nausea, vomiting, fevers, Chills , contipation, diarrhea or sweats    Physical Exam:      Vitals:    09/20/17 0937   BP: 127/74   Pulse: (!) 59     alert and oriented ×4 follows commands  answers all questions appropriately,affect wnl  Manual muscle test 5 out of 5 EXCEPT FOR SHOULDER ABDUCTORS AND FWD FLEXORS  Dec rom of right shoulder- abduction and fwd flexion 60deg  Nl gait  No C/C/E      Assessment:  SHOULDER DJD INCLUDING LABRUM  SUPRASPINATUS PARTIAL TEAR  DM2  CKD    Plan:  SCHEDULE REPEAT SUPRASCP NERVE BLOCK +HYDRODISSECTION RIGHT SHOULDER  SCHEDULE PRP RIGHT SHOULDER SUPRASPINATUS  IN FUTURE MAY ALSO NEED PRP OF LABRUM

## 2017-09-25 ENCOUNTER — CLINICAL SUPPORT (OUTPATIENT)
Dept: REHABILITATION | Facility: HOSPITAL | Age: 67
End: 2017-09-25
Attending: INTERNAL MEDICINE
Payer: MEDICARE

## 2017-09-25 DIAGNOSIS — M54.12 CERVICAL RADICULAR PAIN: ICD-10-CM

## 2017-09-25 DIAGNOSIS — R41.89 COGNITIVE IMPAIRMENT: Primary | ICD-10-CM

## 2017-09-25 DIAGNOSIS — R68.89 IMPAIRED FUNCTION OF UPPER EXTREMITY: ICD-10-CM

## 2017-09-25 DIAGNOSIS — R27.8 LOSS OF COORDINATION: ICD-10-CM

## 2017-09-25 DIAGNOSIS — M25.611 STIFFNESS OF SHOULDER JOINT, RIGHT: ICD-10-CM

## 2017-09-25 DIAGNOSIS — R29.898 DECREASED GRIP STRENGTH OF RIGHT HAND: ICD-10-CM

## 2017-09-25 PROCEDURE — 92507 TX SP LANG VOICE COMM INDIV: CPT | Mod: PN

## 2017-09-25 PROCEDURE — G9169 MEMORY GOAL STATUS: HCPCS | Mod: CI,PN

## 2017-09-25 PROCEDURE — G9168 MEMORY CURRENT STATUS: HCPCS | Mod: CJ,PN

## 2017-09-25 PROCEDURE — 97530 THERAPEUTIC ACTIVITIES: CPT | Mod: PN

## 2017-09-25 NOTE — PROGRESS NOTES
"Occupational Therapy    Date:  09/25/2017  Start Time:  1304  Stop Time:  1420    TIMED  Procedure Time Min.   TherAct (5) Start:  Stop: 74   Total Timed Minutes:  70  Total Timed Units:  5  Total Untimed Units:  0  Charges Billed/# of units:  5    Progress/Current Status    Subjective:     Patient ID: Neptali Gonzalez is a 66 y.o. male.  Diagnosis:   1. Cognitive impairment     2. Loss of coordination     3. Impaired function of upper extremity     4. Decreased  strength of right hand     5. Stiffness of shoulder joint, right     6. Cervical radicular pain       Pain: 0 /10  Marko says that he is "trying" to do his HEP daily.    Objective:     Handwriting with various pencil  (none, blue foam, cross-over , "the pencil ." - MF noted to position in MP and PIP flexion with all  unless Marko was specifically cued. Ended with "the pencil ." OT issued standard pen with "the pencil ." for practice at home. Legibility best with no  on 3.2cm lisa pen and with "the pencil " on 2.5 cm lisa pen.  Mobilization of metacarpals, carpals, radius and ulna to decrease edema and improve movement.  Placed flat paddle on pt's hand for duration of treatment to inhibit flexor tone and provide LLPS to long finger flexors. Facilitation of a/p pelvic tilt, R/L lateral pelvic tilt. Hands at sides in position of support to facilitate scapulo-pelvic rhythm.    Mobilization of shoulder, including levator, anterior complex, lats and combined internal rotators. Added 70* docking station and bivalve for elbow to inhibit flexor tone and provide LLPS to wrist flexors as well as long finger flexors.   Supine for mobilization of shoulder to improve flex and ER with rotational movements of trunk on scapulae. Facilitation of AAROM sh flexion supine w/ progressive angling toward upright. Sh flex=112.  Sit->supine w/ supervision; supine->sit w/ CGA. Seated EOM, perf'd digit extension after removal of paddle. " "Attempted MCP extension w/ IP flexion to improve ability to separate movements of finger joints, but pt unable to consistently perform without assist.    Assessment:     Marko demo'd ability to write name today w/ various degrees of legibility. Control of pen is limited by tendency of MF to position in MCP/PIP flexion and DIP extension, though he demo'd slight improvement with pencil .  Marko can continue to make progress toward OT goals by improving muscle balance throughout the R side, lengthening and strengthening where appropriate and improving function to allow increased independence and participation in his valued activities.    Patient Education/Response:     OT reviewed need to increase compliance with HEP for better outcomes. Updated exs with handout and copied older handout for stretches. Issued pencil  as noted, encouraged pt to practice on lined paper, and reinforced ed to keep MF positioned with P1 on pencil . He v/u.    Plans and Goals:      POC update.    LTG GOALS:  Time frame: 12 weeks (7/7/2017-9/29/2017)  1) Pt will be able to keep the right hand open during overhead reaching w/o cuing  2) Pt will be able to use the R UE to lift large "light" objects (10#) requiring 2 hands to chest height to increase indep w/ work-related activity.  3) Pt will be able to sign his name legibly with modified technique/ built-up pen as necessary.  4) Pt will demo >3/4 AROM throughout the R UE for increased function.   5) Pt will demo picking up a 25# weight w/ elsa UE to waist height w/ out stretched arms to increase indep w/ work- related activity     ALEXANDRE Rojo, LOTR  9/25/2017    "

## 2017-09-25 NOTE — PROGRESS NOTES
SLP Outpatient Progress Note     Neptali Gonzalez  Primary Diagnosis: Cerebral Infarction (Left Hemisphere)  Treatment Diagnosis: Cognitive Impairment  Date: 09/25/2017  Start Time:  12:00 PM  Stop Time:  1:00 PM  Total Timed Minutes:  60 minutes     Past Medical History:           Past Medical History:   Diagnosis Date    Coronary artery disease 2002     stent    Hypertension      Kidney stones      MI (myocardial infarction)           SUBJECTIVE:  Patient was friendly, alert, and cooperated well with all tasks.      OBJECTIVE:  Patient to be treated for cognitive impairment 1x/week for 16 weeks to target STM deficits, higher-level problem solving, and executive function deficits in order to improve safety and overall quality of life.  Goals addressed this session:  1) Patient will identify problems and generate 3 solutions to each independently.  2) Patient will organize and sequence 5 step activities with min A with 90% accuracy.  3) Patient will demonstrate recall of functional information following a short-term delay with min A and 90% accuracy.     ASSESSMENT:  Progress towards goals addressed during today's session:  1) Patient identified problems utilizing verbal and visual picture scenarios and generated 3 solutions to each with Mod A and 70% accuracy.  2) Patient organized and sequenced 4 step activities with mod A and 85% accuracy.  3) Patient demonstrated recall of 3 pieces of information given verbally, following a short-term delay, with min A and 70% accuracy.     Rehab Potential: good      PLAN:   Continue current POC  Certification Period:  07/31/2017 to 11/20/2017  Recommended Treatment Plan: 1 time per week for 16 weeks     Therapist's Name: Rubi Matias MS, CCC-SLP  Date: 09/25/2017

## 2017-09-28 ENCOUNTER — CLINICAL SUPPORT (OUTPATIENT)
Dept: REHABILITATION | Facility: HOSPITAL | Age: 67
End: 2017-09-28
Attending: INTERNAL MEDICINE
Payer: MEDICARE

## 2017-09-28 DIAGNOSIS — R29.898 DECREASED GRIP STRENGTH OF RIGHT HAND: ICD-10-CM

## 2017-09-28 DIAGNOSIS — M25.611 STIFFNESS OF SHOULDER JOINT, RIGHT: ICD-10-CM

## 2017-09-28 DIAGNOSIS — R68.89 IMPAIRED FUNCTION OF UPPER EXTREMITY: ICD-10-CM

## 2017-09-28 DIAGNOSIS — R27.8 LOSS OF COORDINATION: Primary | ICD-10-CM

## 2017-09-28 NOTE — PROGRESS NOTES
Occupational Therapy Treatment and POC update    Date:  09/28/2017  Start Time:  1215  Stop Time:  1310      TIMED  Procedure Time Min.   TherAct (4) Start:  Stop: 55   Total Timed Minutes:  55  Total Timed Units:  4  Total Untimed Units:  0  Charges Billed/# of units:  4    Progress/Current Status    Subjective:     Patient ID: Neptali Gonzalez is a 66 y.o. male.  Diagnosis:   1. Loss of coordination     2. Impaired function of upper extremity     3. Decreased  strength of right hand     4. Stiffness of shoulder joint, right       Pain: 0 /10 at rest.  Marko states that he wants to continue improving the functional use of the R UE and does not want to stop coming to therapy.    Objective:     GMC: Box & block: R=23 (improved from 20)    Hand Strength     (lbs) Right On eval   1 38 31   2 31 26   3 37 27   avg 35.33 28     Pinch Right On eval   Lateral 17 17.5   Tip (2 point) 10 11   3 jaw phyllis 8.5 11     Sh flex=95a, 112p (105a after stretching)  Abd=93a, 95p  Ext=50a  PROM SLIR=10 (pre-stretching) and 20 (post stretching)  PROM ER@0*abd= 60, ER@45*abd= 48, ER@90*abd= 24     SLIR, 10x15s  Forward reaching. He demo'd ability to reach up to head level with hand open for about 5 reps. However, as he fatigued, he required cues to focus on keeping hand open. Continued forward reaching w/ facilitation of scapular rotation.  Picking up 10# box to chest height x3 w/ good body mechanics.  Picking up 26# box to waist height x3 w/ good body mechanics.  He is able to write his name legibly with a pencil .    Assessment:     Marko has met 4 of 5 LTG's. He says that he has noticed improvement in function, but wants to continue to improve. Additionally, his problem solving and memory seem to have improved some since he started ST. He can continue to make progress toward OT goals by improving muscle balance throughout the R side, lengthening and strengthening where appropriate and improving function to allow  "increased independence and participation in his valued activities and roles, especially to allow success with his multiple employment endeavors. Will update POC.    Patient Education/Response:     Review of HEP.    Plans and Goals:     Please see attached updated POC.    LTG GOALS:  Time frame: 12 weeks (7/7/2017-9/29/2017)  1) Pt will be able to keep the right hand open during overhead reaching w/o cuing (MET)  2) Pt will be able to use the R UE to lift large "light" objects (10#) requiring 2 hands to chest height to increase indep w/ work-related activity. (MET)  3) Pt will be able to sign his name legibly with modified technique/ built-up pen as necessary. (MET)  4) Pt will demo >3/4 AROM throughout the R UE for increased function. (not met)  5) Pt will demo picking up a 25# weight w/ elsa UE to waist height w/ out stretched arms to increase indep w/ work- related activity (MET)       "

## 2017-09-29 ENCOUNTER — TELEPHONE (OUTPATIENT)
Dept: PHYSICAL MEDICINE AND REHAB | Facility: CLINIC | Age: 67
End: 2017-09-29

## 2017-09-29 ENCOUNTER — CLINICAL SUPPORT (OUTPATIENT)
Dept: PHYSICAL MEDICINE AND REHAB | Facility: CLINIC | Age: 67
End: 2017-09-29
Payer: MEDICARE

## 2017-09-29 VITALS
BODY MASS INDEX: 25.94 KG/M2 | HEIGHT: 66 IN | DIASTOLIC BLOOD PRESSURE: 68 MMHG | HEART RATE: 49 BPM | SYSTOLIC BLOOD PRESSURE: 118 MMHG | WEIGHT: 161.38 LBS

## 2017-09-29 DIAGNOSIS — M25.511 RIGHT SHOULDER PAIN, UNSPECIFIED CHRONICITY: Primary | ICD-10-CM

## 2017-09-29 PROCEDURE — 76942 ECHO GUIDE FOR BIOPSY: CPT | Mod: S$GLB,,, | Performed by: PHYSICAL MEDICINE & REHABILITATION

## 2017-09-29 PROCEDURE — 64450 NJX AA&/STRD OTHER PN/BRANCH: CPT | Mod: S$GLB,,, | Performed by: PHYSICAL MEDICINE & REHABILITATION

## 2017-09-29 PROCEDURE — 99999 PR PBB SHADOW E&M-EST. PATIENT-LVL III: CPT | Mod: PBBFAC,,, | Performed by: PHYSICAL MEDICINE & REHABILITATION

## 2017-09-29 PROCEDURE — 99499 UNLISTED E&M SERVICE: CPT | Mod: S$GLB,,, | Performed by: PHYSICAL MEDICINE & REHABILITATION

## 2017-09-29 RX ORDER — LIDOCAINE HYDROCHLORIDE 10 MG/ML
5 INJECTION INFILTRATION; PERINEURAL ONCE
Status: DISCONTINUED | OUTPATIENT
Start: 2017-09-29 | End: 2018-01-30

## 2017-09-29 NOTE — PROGRESS NOTES
"  pROCEDURE NOTE: risk and benefit of right suprascapular NERVE BLOCK AND HYDRODISEECTION injection reviewed with. patient. Verbal consent was obtained. Injection was performed after sterile prep w. Betadine. MEDIAL TO LATERAL approach used. PT. WAS IN A SIDE LAYING POSITION. INJECTION WAS PERFORMED ALONG  EDGE OF SPINOGLENOID NOTCH. 21g 2" needle used hydrodissecting along SPINOGLENOID NOTCH.     Ultrasound guidance was utilized for needle visualization. A TOTAL OF 5ML OF LIDOCAINE 1% AND 5ML OF STERILE NORMAL SALINE WAS UTILIZED. No complications. iMMEDIATE IMPROVEMENT OF SHOULDER PAIN POST    Ultrasound interpretation was performed prior to the procedure to identify the target and any adjacent neurovascular structures.  Subsequently, interpretation was performed during real- time needle guidance confirming placement. Post- intervention interpretation was also performed confirming appropriate injectate flow and hemostasis.  Images indicating needle placement have been saved in [facility].      "

## 2017-09-29 NOTE — TELEPHONE ENCOUNTER
----- Message from Qi Warner sent at 9/29/2017  9:06 AM CDT -----  Patient is scheduled for injection at 1:40/stated is out of town and needs to come for 2:20 instead/please call back at [phone] to advise.

## 2017-10-03 NOTE — PLAN OF CARE
"    Date: 9/28/2017    OCCUPATIONAL THERAPY UPDATED PLAN OF TREATMENT    Patient name: Neptali Gonzalez  Onset Date:  Exact date unknown  SOC Date:  7/7/2017    Primary Diagnosis:  L MCA infarct, R supraspinatus tear  Encounter Diagnoses   Name Primary?   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 He wasn't writing and reported difficulty with lifting and grabbing. He complained of less power in the right arm than the left.  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 He hasn't reported doing any pony rides. He states that he has been practicing his writing since the previous session. He states that the R arm is working better, but he still wants to improve his handwriting, his coordination and the overall movement throughout the arm. He has met 4/5 LTG's but continues to have difficulty with ROM and coordination. Please see attached treatment note     Previous Goals' Status:   LTG GOALS:  Time frame: 12 weeks (7/7/2017-9/29/2017)  1) Pt will be able to keep the right hand open during overhead reaching w/o cuing (MET)  2) Pt will be able to use the R UE to lift large "light" objects (10#) requiring 2 hands " to chest height to increase indep w/ work-related activity. (MET)  3) Pt will be able to sign his name legibly with modified technique/ built-up pen as necessary. (MET)  4) Pt will demo >3/4 AROM throughout the R UE for increased function. (not met)  5) Pt will demo picking up a 25# weight w/ elsa UE to waist height w/ out stretched arms to increase indep w/ work- related activity (MET)    New Goals:  Short Term Goals: 4 weeks (9/29/2017-10/27/2017)  1) Marko will be able to keep hand open and use the R UE for support on the wall, at head height, while reaching overhead with the L UE during functional activity for 10 reps.  2) Marko will demo the ability to coordinate the R UE for management of a 15# bag to simulate pouring pony feed.    Long Term Goals: 12 weeks (9/29/2017-12/22/2017)  1) Marko will be able to manage a 30# bag of horse feed to transfer it to trunk of car and to simulate pouring pony feed.   2) Marko demo sufficient ability to sustain coordination to write 5 lines legibly, with pencil  as necessary.  3) Marko will demo improved ROM throughout the R UE by at least 20* in all passive rotation and 10* in active flexion and abduction.  4) Marko will demo improved GMC in the R UE as evidenced by a box/block score of at least 30.  4) pt will be indep w/ progressive strengthening HEP.    Reasons for Recertification of Therapy:  Marko has met 4/5 LTG's, demonstrating improvement in overall function of the R UE. Additionally, his memory and problem-solving seem to have improved since he started ST, leading to a slight improvement in HEP compliance. As Marko has demonstrated to meet 80% of his LTG's, it is expected that he will be able to continue progressing to maximize the functional use of his R UE.    Recommended Treatment Plan: Therapeutic Exercise, Functional Activities, Patient Education, Home Exercise Program, ADL Training, Transfer/Mobility Training, Sensory/Neuromuscular Reeducation, Functional Standing,  Cognitive Preceptual Retraining, Manual Therapy and any other approaches deemed necessary to assist Mr. Gonzalez in meeting his goals.    Therapist's Name: ALEXANDRE Rojo LOTR        Date: 9/28/2017      I CERTIFY THE NEED FOR THESE SERVICES FURNISHED UNDER THIS PLAN OF TREATMENT AND WHILE UNDER MY CARE    Physician's comments: ________________________________________________________________________________________________________________________________________________      Physician's Name (print): ___________________________________    Physician's Signature: _______________________________________________    Date: ________________________

## 2017-10-06 ENCOUNTER — IMMUNIZATION (OUTPATIENT)
Dept: INTERNAL MEDICINE | Facility: CLINIC | Age: 67
End: 2017-10-06
Payer: MEDICARE

## 2017-10-06 ENCOUNTER — TELEPHONE (OUTPATIENT)
Dept: INTERNAL MEDICINE | Facility: CLINIC | Age: 67
End: 2017-10-06

## 2017-10-06 ENCOUNTER — OFFICE VISIT (OUTPATIENT)
Dept: INTERNAL MEDICINE | Facility: CLINIC | Age: 67
End: 2017-10-06
Payer: MEDICARE

## 2017-10-06 VITALS
DIASTOLIC BLOOD PRESSURE: 64 MMHG | HEIGHT: 66 IN | SYSTOLIC BLOOD PRESSURE: 128 MMHG | HEART RATE: 58 BPM | BODY MASS INDEX: 24.87 KG/M2 | WEIGHT: 154.75 LBS

## 2017-10-06 DIAGNOSIS — I77.1 ILIAC ARTERY STENOSIS, RIGHT: ICD-10-CM

## 2017-10-06 DIAGNOSIS — E78.2 DYSLIPIDEMIA WITH LOW HIGH DENSITY LIPOPROTEIN (HDL) CHOLESTEROL WITH HYPERTRIGLYCERIDEMIA DUE TO TYPE 2 DIABETES MELLITUS: ICD-10-CM

## 2017-10-06 DIAGNOSIS — I77.819 ECTATIC AORTA: ICD-10-CM

## 2017-10-06 DIAGNOSIS — I69.351 HEMIPLEGIA AND HEMIPARESIS FOLLOWING CEREBRAL INFARCTION AFFECTING RIGHT DOMINANT SIDE: ICD-10-CM

## 2017-10-06 DIAGNOSIS — M47.812 CERVICAL ARTHRITIS: ICD-10-CM

## 2017-10-06 DIAGNOSIS — E11.69 DYSLIPIDEMIA WITH LOW HIGH DENSITY LIPOPROTEIN (HDL) CHOLESTEROL WITH HYPERTRIGLYCERIDEMIA DUE TO TYPE 2 DIABETES MELLITUS: ICD-10-CM

## 2017-10-06 DIAGNOSIS — Z00.00 ENCOUNTER FOR PREVENTIVE HEALTH EXAMINATION: Primary | ICD-10-CM

## 2017-10-06 DIAGNOSIS — C18.9 MALIGNANT NEOPLASM OF COLON, UNSPECIFIED PART OF COLON: Primary | ICD-10-CM

## 2017-10-06 DIAGNOSIS — I70.0 AORTIC ARCH ATHEROSCLEROSIS: ICD-10-CM

## 2017-10-06 DIAGNOSIS — I10 ESSENTIAL HYPERTENSION: ICD-10-CM

## 2017-10-06 DIAGNOSIS — M54.12 CERVICAL RADICULAR PAIN: ICD-10-CM

## 2017-10-06 DIAGNOSIS — R48.2 APRAXIA: ICD-10-CM

## 2017-10-06 DIAGNOSIS — E11.59 TYPE 2 DIABETES MELLITUS WITH OTHER CIRCULATORY COMPLICATION, WITHOUT LONG-TERM CURRENT USE OF INSULIN: ICD-10-CM

## 2017-10-06 DIAGNOSIS — D69.2 SENILE PURPURA: ICD-10-CM

## 2017-10-06 DIAGNOSIS — I77.9 BILATERAL CAROTID ARTERY DISEASE: ICD-10-CM

## 2017-10-06 PROCEDURE — 90662 IIV NO PRSV INCREASED AG IM: CPT | Mod: S$GLB,,, | Performed by: INTERNAL MEDICINE

## 2017-10-06 PROCEDURE — G0008 ADMIN INFLUENZA VIRUS VAC: HCPCS | Mod: S$GLB,,, | Performed by: INTERNAL MEDICINE

## 2017-10-06 PROCEDURE — G0439 PPPS, SUBSEQ VISIT: HCPCS | Mod: S$GLB,,, | Performed by: NURSE PRACTITIONER

## 2017-10-06 PROCEDURE — 99999 PR PBB SHADOW E&M-EST. PATIENT-LVL IV: CPT | Mod: PBBFAC,,, | Performed by: NURSE PRACTITIONER

## 2017-10-06 NOTE — TELEPHONE ENCOUNTER
Please let patient know that I ordered a colonoscopy per his HRA appointment. This is the most thorough way to screen for colon cancer. He can also do a stool card to screen since his last colonoscopy was normal. We ordered this at his last appointment on 8/22/17 but he did not turn in. We can order this again if he would rather do the stool card.    Thanks,  MICK

## 2017-10-06 NOTE — PROGRESS NOTES
"Neptali Gonzalez presented for a  Medicare AWV and comprehensive Health Risk Assessment today. The following components were reviewed and updated:    · Medical history  · Family History  · Social history  · Allergies and Current Medications  · Health Risk Assessment  · Health Maintenance  · Care Team     ** See Completed Assessments for Annual Wellness Visit within the encounter summary.**       The following assessments were completed:  · Living Situation  · CAGE  · Depression Screening  · Timed Get Up and Go  · Whisper Test  · Cognitive Function Screening - unable to administer - unable to write/draw clock  · Nutrition Screening  · ADL Screening  · PAQ Screening    Vitals:    10/06/17 1049   BP: 128/64   BP Location: Left arm   Patient Position: Sitting   BP Method: Medium (Manual)   Pulse: (!) 58   Weight: 70.2 kg (154 lb 12.2 oz)   Height: 5' 6" (1.676 m)     Body mass index is 24.98 kg/m².  Physical Exam   Constitutional: He is oriented to person, place, and time. He appears well-developed and well-nourished.   HENT:   Head: Normocephalic.   Cardiovascular: Normal rate and regular rhythm.    Pulmonary/Chest: Effort normal and breath sounds normal.   Abdominal: Soft. Bowel sounds are normal.   Musculoskeletal: Normal range of motion. He exhibits no edema.   Neurological: He is alert and oriented to person, place, and time.   Right hemiparesis   Skin: Skin is warm and dry.   Psychiatric: He has a normal mood and affect.   Nursing note and vitals reviewed.        Diagnoses and health risks identified today and associated recommendations/orders:    1. Encounter for preventive health examination  Here for Health Risk Assessment/Annual Wellness Visit.  Follow up in one year.  Reports he is unable to obtain stool per Fit Kit. PCP notified.    2. Essential hypertension  Chronic, stable on current medications. Followed by PCP.    3. Aortic arch atherosclerosis  Chronic, stable on current medications. Noted AAA U/S " 5/25/17. Followed by PCP.    4. Ectatic aorta  Chronic, stable on current medications. Followed by PCP.    5. Iliac artery stenosis, right  Chronic, stable on current medications. Followed by PCP.    6. Bilateral carotid artery disease  Chronic, stable on current medications. 40-49% stenosis right, 20-39% stenosis left noted on CTA 5/15/17. Followed by PCP.    7. Dyslipidemia with low high density lipoprotein (HDL) cholesterol with hypertriglyceridemia due to type 2 diabetes mellitus  Chronic, stable on current medications. Followed by PCP.    8. Uncontrolled secondary diabetes mellitus with stage 3 CKD (GFR 30-59)  Chronic, A1C trending downward on current medications. Followed by PCP.    9. Type 2 diabetes mellitus with other circulatory complication, without long-term current use of insulin  Chronic, A1C trending downwar on current medications. Followed by PCP.    10. Hemiplegia and hemiparesis following cerebral infarction affecting right dominant side  Chronic, stable. Followed by PCP, Physical Medicine.    11. Apraxia  Chronic, stable. Followed by PCP, Physical Medicine.    12. Cervical arthritis  Chronic, stable.. Followed by PCP, Physical Medicine.    13. Cervical radicular pain  Chronic, stable. Followed by PCP, Physical Medicine, Pain Management.    14. Senile purpura  Chronic, stable. Followed by PCP.      Provided Neptali with a 5-10 year written screening schedule and personal prevention plan. Recommendations were developed using the USPSTF age appropriate recommendations. Education, counseling, and referrals were provided as needed. After Visit Summary printed and given to patient which includes a list of additional screenings\tests needed.    Return in 7 weeks (on 11/27/2017).with PCP    Alexa Guillaume NP

## 2017-10-06 NOTE — Clinical Note
Here for HRA. Reported that he was unable to do the Fit Kit. He said that he was willing to undergo a colonoscopy, but her would no let me schedule it. He stated that he wanted you to order it. Thanks

## 2017-10-06 NOTE — PATIENT INSTRUCTIONS
Counseling and Referral of Other Preventative  (Italic type indicates deductible and co-insurance are waived)    Patient Name: Neptali Gonzalez  Today's Date: 10/6/2017      SERVICE LIMITATIONS RECOMMENDATION    Vaccines    · Pneumococcal (once after 65)    · Influenza (annually)    · Hepatitis B (if medium/high risk)    · Prevnar 13      Hepatitis B medium/high risk factors:       - End-stage renal disease       - Hemophiliacs who received Factor VII or         IX concentrates       - Clients of institutions for the mentally             retarded       - Persons who live in the same house as          a HepB carrier       - Homosexual men       - Illicit injectable drug abusers     Pneumococcal: Due after 8/21/18     Influenza: Done, repeat in one year     Hepatitis B: N/A     Prevnar 13: Done, no repeat necessary    Prostate cancer screening (annually to age 75)     Prostate specific antigen (PSA) Shared decision making with Provider. Sometimes a co-pay may be required if the patient decides to have this test. The USPSTF no longer recommends prostate cancer screening routinely in medicine: per PCP    Colorectal cancer screening (to age 75)    · Fecal occult blood test (annual)  · Flexible sigmoidoscopy (5y)  · Screening colonoscopy (10y)  · Barium enema   Need to schedule    Diabetes self-management training (no USPSTF recommendations)  Requires referral by treating physician for patient with diabetes or renal disease. 10 hours of initial DSMT sessions of no less than 30 minutes each in a continuous 12-month period. 2 hours of follow-up DSMT in subsequent years.  per PCP    Glaucoma screening (no USPSTF recommendation)  Diabetes mellitus, family history   , age 50 or over    American, age 65 or over  Done this year, repeat every year    Medical nutrition therapy for diabetes or renal disease (no recommended schedule)  Requires referral by treating physician for patient with diabetes or renal  disease or kidney transplant within the past 3 years.  Can be provided in same year as diabetes self-management training (DSMT), and CMS recommends medical nutrition therapy take place after DSMT. Up to 3 hours for initial year and 2 hours in subsequent years.  per PCP    Cardiovascular screening blood tests (every 5 years)  · Fasting lipid panel  Order as a panel if possible  Done this year, repeat every year    Diabetes screening tests (at least every 3 years, Medicare covers annually or at 6-month intervals for prediabetic patients)  · Fasting blood sugar (FBS) or glucose tolerance test (GTT)  Patient must be diagnosed with one of the following:       - Hypertension       - Dyslipidemia       - Obesity (BMI 30kg/m2)       - Previous elevated impaired FBS or GTT       ... or any two of the following:       - Overweight (BMI 25 but <30)       - Family history of diabetes       - Age 65 or older       - History of gestational diabetes or birth of baby weighing more than 9 pounds  Last done 8/2017, recommend to repeat every 6  months    Abdominal aortic aneurysm screening (once)  · Sonogram   Limited to patients who meet one of the following criteria:       - Men who are 65-75 years old and have smoked more than 100 cigarette in their lifetime       - Anyone with a family history of abdominal aortic aneurysm       - Anyone recommended for screening by the USPSTF  Done, no repeat necessary    HIV screening (annually for increased risk patients)  · HIV-1 and HIV-2 by EIA, or MARCOS, rapid antibody test or oral mucosa transudate  Patients must be at increased risk for HIV infection per USPSTF guidelines or pregnant. Tests covered annually for patient at increased risk or as requested by the patient. Pregnant patients may receive up to 3 tests during pregnancy.  Risks discussed, screening is not recommended    Smoking cessation counseling (up to 8 sessions per year)  Patients must be asymptomatic of tobacco-related  conditions to receive as a preventative service.  Non-smoker    Subsequent annual wellness visit  At least 12 months since last AWV  Return in one year     The following information is provided to all patients.  This information is to help you find resources for any of the problems found today that may be affecting your health:                Living healthy guide: www.[facility].[place].[facility]      Understanding Diabetes: www.diabetes.org      Eating healthy: www.cdc.gov/healthyweight      CDC home safety checklist: www.cdc.gov/steadi/patient.html      Agency on Aging: www.goea.[place].[facility]      Alcoholics anonymous (AA): www.aa.org      Physical Activity: www.thad.nih.gov/tq2mvvc      Tobacco use: www.quitwithusla.org

## 2017-10-09 ENCOUNTER — PATIENT MESSAGE (OUTPATIENT)
Dept: INTERNAL MEDICINE | Facility: CLINIC | Age: 67
End: 2017-10-09

## 2017-10-09 ENCOUNTER — CLINICAL SUPPORT (OUTPATIENT)
Dept: REHABILITATION | Facility: HOSPITAL | Age: 67
End: 2017-10-09
Attending: FAMILY MEDICINE
Payer: MEDICARE

## 2017-10-09 DIAGNOSIS — R41.89 COGNITIVE IMPAIRMENT: Primary | ICD-10-CM

## 2017-10-09 PROCEDURE — G9168 MEMORY CURRENT STATUS: HCPCS | Mod: CJ,PN

## 2017-10-09 PROCEDURE — 92507 TX SP LANG VOICE COMM INDIV: CPT | Mod: PN

## 2017-10-09 PROCEDURE — G9169 MEMORY GOAL STATUS: HCPCS | Mod: CI,PN

## 2017-10-09 NOTE — TELEPHONE ENCOUNTER
Patient stated that he would like to just go a head and do the colonoscopy .    Scheduling number will be sent Via Patient Portal .

## 2017-10-09 NOTE — PROGRESS NOTES
SLP Outpatient Progress Note     Neptali Gonzalez  Primary Diagnosis: Cerebral Infarction (Left Hemisphere)  Treatment Diagnosis: Cognitive Impairment  Date: 10/09/2017  Start Time:  12:00 PM  Stop Time:  1:00 PM  Total Timed Minutes:  60 minutes     Past Medical History:           Past Medical History:   Diagnosis Date    Coronary artery disease 2002     stent    Hypertension      Kidney stones      MI (myocardial infarction)           SUBJECTIVE:  Patient was friendly, alert, and cooperated well with all tasks.      OBJECTIVE:  Patient to be treated for cognitive impairment 1x/week for 16 weeks to target STM deficits, higher-level problem solving, and executive function deficits in order to improve safety and overall quality of life.  Goals addressed this session:  1) Patient will identify problems and generate 3 solutions to each independently.  2) Patient will organize and sequence 5 step activities with min A with 90% accuracy.  3) Patient will demonstrate recall of functional information following a short-term delay with min A and 90% accuracy.     ASSESSMENT:  Progress towards goals addressed during today's session:  1) Patient identified problems utilizing verbal and visual picture scenarios and generated 3 solutions to each with Mod A and 70% accuracy.  2) Patient organized and sequenced 4 step activities with mod A and 85% accuracy.  3) Patient demonstrated recall of 3 pieces of information given verbally, following a short-term delay, with min A and 70% accuracy.     Rehab Potential: good      PLAN:   Continue current POC  Certification Period:  07/31/2017 to 11/20/2017  Recommended Treatment Plan: 1 time per week for 16 weeks     Therapist's Name: Rubi Matias MS, CCC-SLP  Date: 10/09/2017

## 2017-10-16 ENCOUNTER — CLINICAL SUPPORT (OUTPATIENT)
Dept: REHABILITATION | Facility: HOSPITAL | Age: 67
End: 2017-10-16
Attending: FAMILY MEDICINE
Payer: MEDICARE

## 2017-10-16 DIAGNOSIS — R41.89 COGNITIVE IMPAIRMENT: Primary | ICD-10-CM

## 2017-10-16 DIAGNOSIS — R68.89 IMPAIRED FUNCTION OF UPPER EXTREMITY: ICD-10-CM

## 2017-10-16 DIAGNOSIS — R27.8 LOSS OF COORDINATION: Primary | ICD-10-CM

## 2017-10-16 DIAGNOSIS — M25.611 STIFFNESS OF SHOULDER JOINT, RIGHT: ICD-10-CM

## 2017-10-16 DIAGNOSIS — R29.898 DECREASED GRIP STRENGTH OF RIGHT HAND: ICD-10-CM

## 2017-10-16 PROCEDURE — 97530 THERAPEUTIC ACTIVITIES: CPT | Mod: PN

## 2017-10-16 PROCEDURE — 92507 TX SP LANG VOICE COMM INDIV: CPT | Mod: PN

## 2017-10-16 PROCEDURE — G9169 MEMORY GOAL STATUS: HCPCS | Mod: CI,PN

## 2017-10-16 PROCEDURE — G9168 MEMORY CURRENT STATUS: HCPCS | Mod: CJ,PN

## 2017-10-16 NOTE — PROGRESS NOTES
SLP Outpatient Progress Note     Neptali Gonzalez  Primary Diagnosis: Cerebral Infarction (Left Hemisphere)  Treatment Diagnosis: Cognitive Impairment  Date: 10/16/2017  Start Time:  12:00 PM  Stop Time:  1:00 PM  Total Timed Minutes:  60 minutes    SUBJECTIVE:  Patient was friendly, alert, and cooperated well with all tasks.      OBJECTIVE:  Patient to be treated for cognitive impairment 1x/week for 16 weeks to target STM deficits, higher-level problem solving, and executive function deficits in order to improve safety and overall quality of life.  Goals addressed this session:  1) Patient will identify problems and generate 3 solutions to each independently.  2) Patient will organize and sequence 5 step activities with min A with 90% accuracy.  3) Patient will demonstrate recall of functional information following a short-term delay with min A and 90% accuracy.     ASSESSMENT:  Progress towards goals addressed during today's session:  1) Patient identified problems utilizing verbal and visual picture scenarios and generated 3 solutions to each with Mod A and 75% accuracy in 4/5 opportunities.  2) Patient organized and sequenced 4 step activities with mod A and 90% accuracy.  3) Patient demonstrated recall of 3 pieces of information given verbally, following a short-term delay, with min A and 75% accuracy.     Rehab Potential: good      PLAN:   Continue current POC  Certification Period:  07/31/2017 to 11/20/2017  Recommended Treatment Plan: 1 time per week for 16 weeks     Therapist's Name: Rubi Matias MS, CCC-SLP  Date: 10/16/2017

## 2017-10-16 NOTE — PROGRESS NOTES
Occupational Therapy    Date:  10/16/2017  Start Time:  1305  Stop Time:  1400    TIMED  Procedure Time Min.   TherAct (4) Start:  Stop: 55   Total Timed Minutes:  55  Total Timed Units:  4  Total Untimed Units:  0  Charges Billed/# of units:  4    Progress/Current Status    Subjective:     Patient ID: Neptali Gonzalez is a 67 y.o. male.  Diagnosis:   1. Cognitive impairment     2. Loss of coordination     3. Impaired function of upper extremity     4. Decreased  strength of right hand     5. Stiffness of shoulder joint, right       Pain: 0 /10  Marko reports slight improvement in R UE function. He had 2 pony rides this weekend and was able to lift kids on to the pony, lift feed and lift hay. He says he has been working on HEP, except for handwriting.  Pt agreeable to OT today (instead of Friday) (due to unplanned availability in OT's schedule.) When OT asked if he took the Friday appt out of his phone schedule, he reported that he did.    Objective:     Mobilization of metacarpals, carpals, radius and ulna to decrease edema and improve movement.  Placed flat paddle on pt's hand for duration of treatment to inhibit flexor tone and provide LLPS to long finger flexors. Added 70* docking station as appropriate to inhibit flexor tone and provide LLPS to wrist flexors as well as long finger flexors.   Facilitation of a/p pelvic tilt, R/L lateral pelvic tilt. Hands at sides in position of support to facilitate scapulo-pelvic rhythm.   Used deflated therapy ball on table to promote self PROM sh flexion.   Mobilization of shoulder, including levator, anterior complex, lats and combined internal rotators.   Sliding hand on incline for facilitation of active forward flexion, additional attention given to ensuring active external rotation and elbow extension, facilitation of scapular rotation during sliding. AAROM to raise hand from incline to facilitate forward reach, multiple trials.    Assessment:     Marko reports  small improvements in function. He can continue to make progress toward OT goals by improving muscle balance throughout the R side, lengthening and strengthening where appropriate and improving function to allow increased independence and participation in his valued activities.    Patient Education/Response:     Continue HEP.  OT confirmed w/ pt that he had OT appt Wednesday, but not Friday due to reschedule. He verified and noted that he took Friday's appointment out of his phone schedule.    Plans and Goals:     Work on use of R UE for support in elevated positions.    ALEXANDRE Rojo LOTR  10/16/2017      Addendum:  Pt did not arrive for 2pm appt on Wednesday 10/18. OT called to check on pt and he reported that he could not find his truck in the quarter and that the police were helping him to look for it. OT notified .  ALEXANDRE Rojo LOTR 10/18/2017 2:32 PM     Addendum:  No show on 10/24  No show on 10/27  OT called pt 10/27 @10:30 to inquire and LM regarding next appt times: 10/30 at 12p and 1p.  ALEXANDRE Rojo LOTR 10/27/2017 10:32 AM

## 2017-10-23 ENCOUNTER — CLINICAL SUPPORT (OUTPATIENT)
Dept: REHABILITATION | Facility: HOSPITAL | Age: 67
End: 2017-10-23
Attending: FAMILY MEDICINE
Payer: MEDICARE

## 2017-10-23 ENCOUNTER — TELEPHONE (OUTPATIENT)
Dept: PHYSICAL MEDICINE AND REHAB | Facility: CLINIC | Age: 67
End: 2017-10-23

## 2017-10-23 DIAGNOSIS — R41.89 COGNITIVE IMPAIRMENT: Primary | ICD-10-CM

## 2017-10-23 PROCEDURE — 92507 TX SP LANG VOICE COMM INDIV: CPT | Mod: PN

## 2017-10-23 PROCEDURE — G9169 MEMORY GOAL STATUS: HCPCS | Mod: CI,PN

## 2017-10-23 PROCEDURE — G9168 MEMORY CURRENT STATUS: HCPCS | Mod: CJ,PN

## 2017-10-23 NOTE — TELEPHONE ENCOUNTER
----- Message from Agnes Santana sent at 10/21/2017 10:40 AM CDT -----  Contact: patient  Patient calling to cancel his appt to get an injection on 10/31/17. He will be out of town. He will call back to reschedule. Please advise.  Call back   Thanks!

## 2017-10-23 NOTE — PROGRESS NOTES
SLP Outpatient Progress Note     Neptali Gonzalez  Primary Diagnosis: Cerebral Infarction (Left Hemisphere)  Treatment Diagnosis: Cognitive Impairment  Date: 10/23/2017  Start Time:  12:00 PM  Stop Time:  1:00 PM  Total Timed Minutes:  60 minutes     SUBJECTIVE:  Patient was friendly, alert, and cooperated well with all tasks. He recalled events from last week with min A and was able to verbally sequence events in order from Monday-Friday with little to no assistance.      OBJECTIVE:  Patient to be treated for cognitive impairment 1x/week for 16 weeks to target STM deficits, higher-level problem solving, and executive function deficits in order to improve safety and overall quality of life.  Goals addressed this session:  1) Patient will identify problems and generate 3 solutions to each independently.  2) Patient will organize and sequence 5 step activities with min A with 90% accuracy.  3) Patient will demonstrate recall of functional information following a short-term delay with min A and 90% accuracy.     ASSESSMENT:  Progress towards goals addressed during today's session:  1) Patient identified problems utilizing verbal and visual picture scenarios and generated 3 solutions to each with Mod A and 80% accuracy.  2) Patient organized and sequenced 4 step activities with mod A and 90% accuracy.  3) Patient demonstrated recall of 3 pieces of information given verbally, following a short-term delay, with min A and 75% accuracy.     Rehab Potential: good      PLAN:   Continue current POC  Certification Period:  07/31/2017 to 11/20/2017  Recommended Treatment Plan: 1 time per week for 16 weeks     Therapist's Name: Rubi Matias MS, CCC-SLP  Date: 10/23/2017

## 2017-11-20 ENCOUNTER — CLINICAL SUPPORT (OUTPATIENT)
Dept: REHABILITATION | Facility: HOSPITAL | Age: 67
End: 2017-11-20
Attending: FAMILY MEDICINE
Payer: MEDICARE

## 2017-11-20 DIAGNOSIS — R41.89 COGNITIVE IMPAIRMENT: Primary | ICD-10-CM

## 2017-11-20 PROCEDURE — G9168 MEMORY CURRENT STATUS: HCPCS | Mod: CJ,PN

## 2017-11-20 PROCEDURE — G9169 MEMORY GOAL STATUS: HCPCS | Mod: CI,PN

## 2017-11-20 PROCEDURE — 92507 TX SP LANG VOICE COMM INDIV: CPT | Mod: PN

## 2017-11-20 NOTE — PLAN OF CARE
SPEECH THERAPY UPDATED PLAN OF TREATMENT    Patient name: Neptali Gonzalez  SOC Date:  07/31/2017  Primary Diagnosis: Cerebral Infarction (Left Hemisphere)  Treatment Diagnosis: Cognitive Impairment  Certification Period:  11/20/2017 to 02/05/2018    Referring Provider:  Karen Chacon MD  [id number] [place] [address]     SUBJECTIVE:  Neptali Gonzalez is a 67 y/o male who presents with mild-moderate cognitive impairment. Deficits noted include decreased short term memory, higher-level problem solving deficits, decreased executive planning and organization, and decreased safety awareness. Patient has exhibited progress towards all goals with skilled therapy sessions and has the potential to meet all goals with continued therapy.     OBJECTIVE:  Previous Short Term Goals Status:  Progressing/Continue  Short Term Goals:  1) Patient will identify problems and generate 3 solutions to each independently. Progressing/Continue  2) Patient will organize and sequence 5 step activities with min A with 90% accuracy.  Progressing/Continue  3) Patient will demonstrate recall of functional information following a short-term delay with min A and 90% accuracy. Progressing/Continue  4) Patient will complete complex reasoning tasks to improve problem solving and safety awareness with 90% accuracy and min A. Progressing/Continue  5) Patient will complete checkbook/financial balancing tasks with 90% accuracy and min A.  Progressing/Continue    Long Term Goal Status:  Progressing/Continue  1) Patient will improve and demonstrate increased cognitive and executive function skills (planning, organizing, self-awareness, problem solving) during daily activities to maximize safety and function in current living environment.       Rehab Potential: good     ASSESSMENT:  Reasons for Recertification of Therapy:    Patient to continue to be treated for cognitive impairment 1x/week for 12 weeks to target STM deficits,  higher-level problem solving, and executive function deficits in order to maximize safety and function. Without continued therapy, patient at risk for further decline, decreased safety awareness and overall decreased quality of life.    PLAN:  Recommended Treatment Plan: 1 time per week for 12 weeks  Certification Period:  11/20/2017 to 02/05/2018    Therapist's Name: Rubi Matias, MS CCC-SLP  Date: 11/20/2017    I CERTIFY THE NEED FOR THESE SERVICES FURNISHED UNDER THIS PLAN OF TREATMENT AND WHILE UNDER MY CARE    Physician's comments: ________________________________________________________________________________________________________________________________________________      Physician's Name: ___________________________________

## 2017-11-21 ENCOUNTER — CLINICAL SUPPORT (OUTPATIENT)
Dept: REHABILITATION | Facility: HOSPITAL | Age: 67
End: 2017-11-21
Attending: FAMILY MEDICINE
Payer: MEDICARE

## 2017-11-21 DIAGNOSIS — R29.898 DECREASED GRIP STRENGTH OF RIGHT HAND: ICD-10-CM

## 2017-11-21 DIAGNOSIS — R41.89 COGNITIVE IMPAIRMENT: Primary | ICD-10-CM

## 2017-11-21 DIAGNOSIS — R68.89 IMPAIRED FUNCTION OF UPPER EXTREMITY: ICD-10-CM

## 2017-11-21 DIAGNOSIS — M25.611 STIFFNESS OF SHOULDER JOINT, RIGHT: ICD-10-CM

## 2017-11-21 DIAGNOSIS — R27.8 LOSS OF COORDINATION: ICD-10-CM

## 2017-11-21 PROCEDURE — 97530 THERAPEUTIC ACTIVITIES: CPT | Mod: PN

## 2017-11-21 PROCEDURE — G8984 CARRY CURRENT STATUS: HCPCS | Mod: CJ,PN

## 2017-11-21 PROCEDURE — G8985 CARRY GOAL STATUS: HCPCS | Mod: CI,PN

## 2017-11-21 NOTE — PROGRESS NOTES
"Occupational Therapy - Treatment and Status Update    Date:  11/21/2017  Start Time:  1110  Stop Time:  1155    TIMED  Procedure Time Min.   TherAct (3) Start:  Stop: 45   Total Timed Minutes:  45  Total Timed Units:  3  Total Untimed Units:  0  Charges Billed/# of units:  3    Progress/Current Status    Subjective:     Patient ID: Neptali Gonzalez is a 67 y.o. male.  Diagnosis:   1. Cognitive impairment     2. Loss of coordination     3. Impaired function of upper extremity     4. Decreased  strength of right hand     5. Stiffness of shoulder joint, right       Pain: 0 /10  Has been working consistently since our last visit. The right arm has been "doing a little bit more" and he has been incorporating it into daily function. He does not notice any increase in stiffness. Marko states that he has not been completing stretching to ER every day as previously instructed. He does the stretches "from time to time."    Objective:     Positioned R UE on wall at neck level for L UE contralateral reaching x10. Repeated w/ R hand at head level. Repeated w/ L UE reaching for fxl task.   Facilitation of a/p pelvic tilt, R/L lateral pelvic tilt.  Hands at sides in position of support to facilitate scapulo-pelvic rhythm - due to stiffness in shoulder, had to progressively move arms into extension for max scapular glide during exercise.  Box&block=20 (decline of 3 from 9/28/17)  Mobilization of shoulder, including levator, anterior complex, lats and combined internal rotators.  Repeated pelvic tilting.     Sh flex=95a, 102p   Abd=92a w/ shoulder shrug in slight scaption, 89p supine, full abduction  Ext=50a  PROM ER@0*abd= 58, ER@45*abd= 47, ER@90*abd=30    Stretch to ER at table top x~12 reps. Held the first for 10s and then increased each by 5 sec to complete stretches for 30s. Then completed 5x30s. Min cues for positioning.  Ed pt that he needs to increase external rotation in order to increase movement of the shoulder " above 90*. He v/u, but this is repeated education and has been explained before.    Assessment:     This is Marko's first visit in a month due to work obligations. He demo'd slight decrease in shoulder ROM and GMC, but overall, measurements are static. He states that he understands need to work on stretch to ER and progress in overhead movement will be dependent on completion of stretches to ER.  Marko can continue to make progress toward OT goals by improving muscle balance throughout the R side, lengthening and strengthening where appropriate and improving function to allow increased independence and participation in his valued activities.    Short Term Goals: 4 weeks (9/29/2017-10/27/2017)  1) Marko will be able to keep hand open and use the R UE for support on the wall, at head height, while reaching overhead with the L UE during functional activity for 10 reps. (MET)  2) Marko will demo the ability to coordinate the R UE for management of a 15# bag to simulate pouring pony feed.     Long Term Goals: 12 weeks (9/29/2017-12/22/2017)  1) Marko will be able to manage a 30# bag of horse feed to transfer it to trunk of car and to simulate pouring pony feed.   2) Marko demo sufficient ability to sustain coordination to write 5 lines legibly, with pencil  as necessary.  3) Marko will demo improved ROM throughout the R UE by at least 20* in all passive rotation and 10* in active flexion and abduction.  4) Marko will demo improved GMC in the R UE as evidenced by a box/block score of at least 30.  4) pt will be indep w/ progressive strengthening HEP.    Patient Education/Response:     OT ed re: necessity to complete stretches to ER and that without this, he will likely not regain overhead use of the R UE. He v/u and handout given for stretch to ER.    Plans and Goals:     Work on use of R UE for support in elevated positions; assess SLIR     G-Codes: carry/handle (ROM/GMC/skilled observation)  Current: 20%-40% (CJ)  Goal:  1%-20% (CI)    ALEXANDRE Rojo, LOTR  11/21/2017

## 2017-11-27 ENCOUNTER — LAB VISIT (OUTPATIENT)
Dept: LAB | Facility: HOSPITAL | Age: 67
End: 2017-11-27
Attending: INTERNAL MEDICINE
Payer: MEDICARE

## 2017-11-27 ENCOUNTER — TELEPHONE (OUTPATIENT)
Dept: INTERNAL MEDICINE | Facility: CLINIC | Age: 67
End: 2017-11-27

## 2017-11-27 ENCOUNTER — OFFICE VISIT (OUTPATIENT)
Dept: INTERNAL MEDICINE | Facility: CLINIC | Age: 67
End: 2017-11-27
Payer: MEDICARE

## 2017-11-27 VITALS
HEIGHT: 66 IN | OXYGEN SATURATION: 98 % | DIASTOLIC BLOOD PRESSURE: 66 MMHG | HEART RATE: 66 BPM | SYSTOLIC BLOOD PRESSURE: 119 MMHG | WEIGHT: 164.88 LBS | BODY MASS INDEX: 26.5 KG/M2

## 2017-11-27 DIAGNOSIS — E11.59 HYPERTENSION ASSOCIATED WITH DIABETES: ICD-10-CM

## 2017-11-27 DIAGNOSIS — I15.2 HYPERTENSION ASSOCIATED WITH DIABETES: ICD-10-CM

## 2017-11-27 DIAGNOSIS — I77.1 ILIAC ARTERY STENOSIS, RIGHT: ICD-10-CM

## 2017-11-27 DIAGNOSIS — E78.2 DYSLIPIDEMIA WITH LOW HIGH DENSITY LIPOPROTEIN (HDL) CHOLESTEROL WITH HYPERTRIGLYCERIDEMIA DUE TO TYPE 2 DIABETES MELLITUS: ICD-10-CM

## 2017-11-27 DIAGNOSIS — E11.69 DYSLIPIDEMIA WITH LOW HIGH DENSITY LIPOPROTEIN (HDL) CHOLESTEROL WITH HYPERTRIGLYCERIDEMIA DUE TO TYPE 2 DIABETES MELLITUS: ICD-10-CM

## 2017-11-27 DIAGNOSIS — D69.2 SENILE PURPURA: ICD-10-CM

## 2017-11-27 DIAGNOSIS — L82.1 SEBORRHEIC KERATOSES: ICD-10-CM

## 2017-11-27 DIAGNOSIS — I69.351 HEMIPLEGIA AND HEMIPARESIS FOLLOWING CEREBRAL INFARCTION AFFECTING RIGHT DOMINANT SIDE: ICD-10-CM

## 2017-11-27 LAB
ANION GAP SERPL CALC-SCNC: 8 MMOL/L
BUN SERPL-MCNC: 21 MG/DL
CALCIUM SERPL-MCNC: 9.5 MG/DL
CHLORIDE SERPL-SCNC: 106 MMOL/L
CO2 SERPL-SCNC: 27 MMOL/L
CREAT SERPL-MCNC: 1.5 MG/DL
EST. GFR  (AFRICAN AMERICAN): 55 ML/MIN/1.73 M^2
EST. GFR  (NON AFRICAN AMERICAN): 47 ML/MIN/1.73 M^2
ESTIMATED AVG GLUCOSE: 128 MG/DL
GLUCOSE SERPL-MCNC: 104 MG/DL
HBA1C MFR BLD HPLC: 6.1 %
POTASSIUM SERPL-SCNC: 4.3 MMOL/L
SODIUM SERPL-SCNC: 141 MMOL/L

## 2017-11-27 PROCEDURE — 99999 PR PBB SHADOW E&M-EST. PATIENT-LVL III: CPT | Mod: PBBFAC,,, | Performed by: INTERNAL MEDICINE

## 2017-11-27 PROCEDURE — 83036 HEMOGLOBIN GLYCOSYLATED A1C: CPT

## 2017-11-27 PROCEDURE — 36415 COLL VENOUS BLD VENIPUNCTURE: CPT

## 2017-11-27 PROCEDURE — 80048 BASIC METABOLIC PNL TOTAL CA: CPT

## 2017-11-27 PROCEDURE — 99215 OFFICE O/P EST HI 40 MIN: CPT | Mod: S$GLB,,, | Performed by: INTERNAL MEDICINE

## 2017-11-27 NOTE — TELEPHONE ENCOUNTER
Patient has been informed about his lab results , and stressed great understanding of results.    Patient also verified medications that he is currently taking :    metformin (GLUCOPHAGE-XR) 500 MG 24 hr tablet -  Take 1 tablet (500 mg total) by mouth daily with breakfast.    lisinopril (PRINIVIL,ZESTRIL) 5 MG tablet-  Take 1 tablet (5 mg total) by mouth once daily.    glipiZIDE (GLUCOTROL) 5 MG -  Take 1 tablet (5 mg total) by mouth daily with breakfast.    atorvastatin (LIPITOR) 40 MG -  Take 1 tablet (40 mg total) by mouth once daily.    aspirin (ECOTRIN) 81 MG EC -  Take 81 mg by mouth once daily.    amlodipine (NORVASC) 10 MG-  TAKE 1 TABLET(10 MG) BY MOUTH EVERY DAY

## 2017-11-27 NOTE — ASSESSMENT & PLAN NOTE
R iliac artery stenosis seen on AA US in 5/2017, stent placed at [facility] in 2007  · Cardiology referral once acute issues addressed  · Patient prefers in 2018  · Continue tertiary prevention with APT, statin, BP control, DM control

## 2017-11-27 NOTE — ASSESSMENT & PLAN NOTE
2007 stroke at [facility] with residual weakness and apraxia of R hand, large chronic L hemisphere infarction on MRI  · F/U Neuro  · Continue aggressive OT/PT/ST

## 2017-11-27 NOTE — PROGRESS NOTES
"Primary Care Provider Appointment    Subjective:      Patient ID: Neptali Gonzalez is a 67 y.o. male with h/o CVA, HTN, DM    Chief Complaint: Follow-up (Patient is here for a over all 3 month check up " Patient stated that he is feeling great")    He has history of CVA L hemisphere stroke in 2007, now with R-sided deficiencies. Patient participating in PT/OT/ST for post-stroke care, with improvement in functional status. He states that he has greater ease in retrieving information while speaking, and improvement in dexterity of UE bilaterally. He continues to have decreased control of the R fingers.     He states he takes "5 meds", but doesn't remember which ones. He states he takes "aspirin, 3 blood pressure meds, metformin," but can not confirm he is taking glipizide. "If it's on there I'm taking it." He forgot his meds at home today.    He has not been checking his glucose because he does not the dexterity for the blood draws. Last A1c was 7.9 in 8/2017. He is compliant with metformin 500mg once daily, and glipizide 5mg (but patient can not confirm that he is taking this).    He is interested in getting a colonoscopy in the near future. He did not turn in his iFOBT cards. His last colonoscopy was 30 years ago, for an unknown reason. He reports that he had polyps at that time that were removed.    Patient is concerned about growths on his back, that he scratches. He does not indorse pruritis.     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     Past Surgical History:   Procedure Laterality Date    CARDIAC SURGERY      CORONARY STENT PLACEMENT      LITHOTRIPSY      TONSILLECTOMY         Past Medical History:   Diagnosis Date    Coronary artery disease 2002    stent    Hypertension     Kidney stones     MI (myocardial infarction)        Review of Systems   Constitutional: Negative for activity change, appetite change, fatigue and " "fever.   Respiratory: Negative for shortness of breath.    Cardiovascular: Negative for chest pain and leg swelling.   Musculoskeletal: Positive for joint swelling.        R shoulder pain  R hand weakness   Neurological: Positive for tremors, weakness and numbness. Negative for dizziness, seizures and speech difficulty.   Psychiatric/Behavioral: Negative for confusion and decreased concentration. The patient is not nervous/anxious.        Objective:   /66 (BP Location: Left arm, Patient Position: Sitting, BP Method: Medium (Manual))   Pulse 66   Ht 5' 6" (1.676 m)   Wt 74.8 kg (164 lb 14.5 oz)   SpO2 98%   BMI 26.62 kg/m²     Physical Exam   Constitutional: He is oriented to person, place, and time. He appears well-developed and well-nourished.   HENT:   Head: Normocephalic.   Eyes: EOM are normal.   Musculoskeletal: Normal range of motion.   RUE weakness  Decreased ROM of R shoulder   Neurological: He is alert and oriented to person, place, and time.   Mild resting tremor in R hand (improved since last exam)   Skin: Skin is warm and dry.   Ecchymoses and purpura on UE bilaterally   Psychiatric: He has a normal mood and affect. His behavior is normal. Judgment and thought content normal.   Nursing note and vitals reviewed.      Lab Results   Component Value Date    WBC 10.05 05/25/2016    HGB 14.7 05/25/2016    HCT 42.9 05/25/2016     05/25/2016    CHOL 176 05/05/2017    TRIG 189 (H) 05/05/2017    HDL 36 (L) 05/05/2017    ALT 29 05/25/2016    AST 24 05/25/2016     08/22/2017    K 4.1 08/22/2017     08/22/2017    CREATININE 1.2 08/22/2017    BUN 20 08/22/2017    CO2 27 08/22/2017    INR 1.1 05/25/2016    HGBA1C 7.9 (H) 08/22/2017         Assessment:   67 y.o. 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  · Monitor  · Advised to stop scratching it  · Informed of option of derm consult  · Patient postponed            Cardiac/Vascular    Dyslipidemia with low high density lipoprotein (HDL) cholesterol with hypertriglyceridemia due to type 2 diabetes mellitus     HDL 37, but ASCVD risk high, compliant with atorvastatin 40mg  · Continue statin   · Continue metformin 500mg   · Increase to BID pending kidney function and A1c  · Continue glipizide 5mg         Relevant Orders    Basic metabolic panel    Hemoglobin A1c    Iliac artery stenosis, right     R iliac artery stenosis seen on AA US in 5/2017, stent placed at [facility] in 2007  · Cardiology referral once acute issues addressed  · Patient prefers in 2018  · Continue tertiary prevention with APT, statin, BP control, DM control         Hypertension associated with diabetes     BP controlled on ACE, CCB  · Continue amlodipine 10mg, lisinopril 5mg  · Continue statin & ASA         Relevant Orders    Basic metabolic panel    Hemoglobin A1c       Hematology    Senile purpura     Ecchymoses and pupura on UE bilaterally  · Continue APT  · Advised mindfulness of movements               Health Maintenance       Date Due Completion Date    Colonoscopy 12/30/2017 (Originally 10/12/1999) ---in 2018    TETANUS VACCINE 10/05/2018 (Originally 10/12/1968) ---    Hemoglobin A1c 02/22/2018 8/22/2017- TODAY    Lipid Panel 05/05/2018 5/5/2017    Foot Exam 06/27/2018 6/27/2017    Eye Exam 07/18/2018 7/18/2017    Override on 5/23/2017: Done (Eye Cam done)    Pneumococcal (65+) (2 of 2 - PPSV23) 08/22/2018 8/22/2017          Return in about 2  months (around 1/27/2018). . One hour spent with this patient today, half of that in counseling.    Karen Chacon MD/MPH  Internal Medicine  [facility]  [phone]

## 2017-11-27 NOTE — PATIENT INSTRUCTIONS
TODAY:  - labs  - inform PCP of home meds  - may increase metformin based on lab results    NEXT:  - Cardiology consult  - colonoscopy  - bring ALL meds  - consider derm consult

## 2017-11-27 NOTE — TELEPHONE ENCOUNTER
Please let patient know that his A1c is 6.1 and his kidney function is at baseline. We do not need to increase the metformin. He should continue it at 500mg once daily and the glipizide 5mg.    Thanks,  KJ

## 2017-11-27 NOTE — ASSESSMENT & PLAN NOTE
HDL 37, but ASCVD risk high, compliant with atorvastatin 40mg  · Continue statin   · Continue metformin 500mg   · Increase to BID pending kidney function and A1c  · Continue glipizide 5mg

## 2017-11-27 NOTE — ASSESSMENT & PLAN NOTE
Multiple located on back, some inflamed from scratching but no evidence of infection.  · Monitor  · Advised to stop scratching it  · Informed of option of derm consult  · Patient postponed

## 2017-11-28 ENCOUNTER — TELEPHONE (OUTPATIENT)
Dept: INTERNAL MEDICINE | Facility: CLINIC | Age: 67
End: 2017-11-28

## 2017-12-06 ENCOUNTER — CLINICAL SUPPORT (OUTPATIENT)
Dept: REHABILITATION | Facility: HOSPITAL | Age: 67
End: 2017-12-06
Payer: MEDICARE

## 2017-12-06 DIAGNOSIS — R41.89 COGNITIVE IMPAIRMENT: Primary | ICD-10-CM

## 2017-12-06 DIAGNOSIS — M25.611 STIFFNESS OF SHOULDER JOINT, RIGHT: ICD-10-CM

## 2017-12-06 DIAGNOSIS — R68.89 IMPAIRED FUNCTION OF UPPER EXTREMITY: ICD-10-CM

## 2017-12-06 DIAGNOSIS — R29.898 DECREASED GRIP STRENGTH OF RIGHT HAND: ICD-10-CM

## 2017-12-06 DIAGNOSIS — R27.8 LOSS OF COORDINATION: ICD-10-CM

## 2017-12-06 PROCEDURE — 97530 THERAPEUTIC ACTIVITIES: CPT | Mod: KX,PN

## 2017-12-06 NOTE — PROGRESS NOTES
Occupational Therapy    Date:  12/06/2017  Start Time:  1210  Stop Time:  1310    TIMED  Procedure Time Min.   TherAct (4) Start:  Stop: 60   Total Timed Minutes:  60  Total Timed Units:  4  Total Untimed Units:  0  Charges Billed/# of units:  4    Progress/Current Status    Subjective:     Patient ID: Neptali Gonzalez is a 67 y.o. male.  Diagnosis:   1. Cognitive impairment     2. Loss of coordination     3. Impaired function of upper extremity     4. Decreased  strength of right hand     5. Stiffness of shoulder joint, right       Pain: 0 /10  He states he can't come next week because he will be in [place] for work. He says he has been working on HEP.    Objective:     Stretch to ER on table 10x15s each.  Use of R UE for support on wall to improve coordination throughout shoulder, including step back/pivot to allow for passive movement to ER in R shldr; multiple trials.   Mobilization of shoulder, including levator, anterior complex, lats and combined internal rotators.  Repeated R UE coordination for support on wall.  AROM sh flex=105  Supine for mobilization of shoulder to improve flex and ER with rotational movements of trunk on scapulae - only able to get to about 90* despite increased AROM noted and mobilization of joint.  SLIR, 10x15s, =27  Coordination of digits/hand for isolated digit extension, focus on relaxing hand to prevent over-recruitment.  Coordination of IP flex/MP ext (like towel crunch) for index finger only. Attempted other digits, but due to over-recruitment, he had increased tightness throughout the hand.    Assessment:     Slight increase in shoulder flexion AROM compared to previous visit with at least partial compliance to HEP, though tightness remains throughout R shoulder. Marko can continue to make progress toward OT goals by improving muscle balance throughout the R side, lengthening and strengthening where appropriate and improving function to allow increased  independence and participation in his valued activities.      Short Term Goals: 4 weeks (9/29/2017-10/27/2017)  1) Marko will be able to keep hand open and use the R UE for support on the wall, at head height, while reaching overhead with the L UE during functional activity for 10 reps. (MET)  2) Marko will demo the ability to coordinate the R UE for management of a 15# bag to simulate pouring pony feed.     Long Term Goals: 12 weeks (9/29/2017-12/22/2017)  1) Marko will be able to manage a 30# bag of horse feed to transfer it to trunk of car and to simulate pouring pony feed.   2) Marko demo sufficient ability to sustain coordination to write 5 lines legibly, with pencil  as necessary.  3) Marko will demo improved ROM throughout the R UE by at least 20* in all passive rotation and 10* in active flexion and abduction.  4) Marko will demo improved GMC in the R UE as evidenced by a box/block score of at least 30.  4) pt will be indep w/ progressive strengthening HEP.    Patient Education/Response:     Updated HEP and new handouts given.    Plans and Goals:     Continue stretching and mobilization of shoulder as well as coordination throughout the arm.    ALEXANDRE Rojo, CASANDRA  12/6/2017

## 2018-01-05 ENCOUNTER — DOCUMENTATION ONLY (OUTPATIENT)
Dept: REHABILITATION | Facility: HOSPITAL | Age: 68
End: 2018-01-05

## 2018-01-05 NOTE — DISCHARGE SUMMARY
REHAB SERVICES OUTPATIENT DISCHARGE SUMMARY  Occupational Therapy      Name:  Neptali Gonzalez  Date:  1/5/2018   Date of Evaluation:  7/7/2017  Physician:  Dr. Chacon  Total # Of Visits:  18  Cancelled:  3  No Shows:  2  Diagnosis: apraxia; impaired R UE function; hx CVA    Physical/Functional Status: Please see EMR    The patient is to be discharged from our Therapy service for the following reason(s):  Patient has not attended therapy since 12/6/2017 due to his work schedule and his POC ended on 12/22/2017.    Degree of Goal Achievement:  Patient has partially met goals    Patient Education:  Pt given HEP w/ handouts throughout his course of treatment.    Discharge Plan:  Continue HEP. F/U w/ MD as necessary.    ALEXANDRE Rojo, CASANDRA  1/5/2018

## 2018-01-08 ENCOUNTER — CLINICAL SUPPORT (OUTPATIENT)
Dept: REHABILITATION | Facility: HOSPITAL | Age: 68
End: 2018-01-08
Payer: MEDICARE

## 2018-01-08 DIAGNOSIS — R41.89 COGNITIVE IMPAIRMENT: Primary | ICD-10-CM

## 2018-01-08 PROCEDURE — 92507 TX SP LANG VOICE COMM INDIV: CPT | Mod: PN

## 2018-01-08 PROCEDURE — G9168 MEMORY CURRENT STATUS: HCPCS | Mod: CK,PN

## 2018-01-08 PROCEDURE — G9169 MEMORY GOAL STATUS: HCPCS | Mod: CI,PN

## 2018-01-08 NOTE — PROGRESS NOTES
SLP Outpatient Progress Note    Patient name: Neptali Gonzalez  SOC Date:  07/31/2017  Primary Diagnosis: Cerebral Infarction (Left Hemisphere)  Treatment Diagnosis: Cognitive Impairment  Certification Period:  11/20/2017 to 02/05/2018     Subjective:  Patient was alert and oriented to day and time. He was confused on location, stating he was in [place], [place] but he has been out of state working for most of the last two weeks. He participated well in all activities and was very cooperative.  Patient's response to therapy: Good     Objective:  Goals addressed during today's session:  1) Patient will identify problems and generate 3 solutions to each independently.   2) Patient will organize and sequence 5 step activities with min A with 90% accuracy.    3) Patient will demonstrate recall of functional information following a short-term delay with min A and 90% accuracy.   4) Patient will complete complex reasoning tasks to improve problem solving and safety awareness with 90% accuracy and min A.   5) Patient will complete checkbook/financial balancing tasks with 90% accuracy and min A.      Assessment:  Progress towards goals addressed today:  1) Patient identified problems utilizing verbal and visual picture scenarios and generated 2 solutions to each with Mod A and 70% accuracy.  2) Patient organized and sequenced 3 step activities with mod A and 70% accuracy.  3) Patient demonstrated recall of 3 pieces of information given verbally, following a short-term delay, with mod A and 70% accuracy.   * Patient has missed several weeks of therapy due to out of town work. Patient's decreased performance may be, in part, because of this.  Progress toward previous goals: Continue STG/LTG      Plan:  Continue current POC   Treatment        Principle of treatment/treatment today: Cognitive-Linguistic Therapy        Treatment time: 12:00-1:00 PM    Follow Up  Follow up in:  1 week     Therapist: Rubi Matias  MS/CCC-SLP  Date: 01/08/2018

## 2018-01-29 ENCOUNTER — CLINICAL SUPPORT (OUTPATIENT)
Dept: REHABILITATION | Facility: HOSPITAL | Age: 68
End: 2018-01-29
Attending: INTERNAL MEDICINE
Payer: MEDICARE

## 2018-01-29 DIAGNOSIS — R41.89 COGNITIVE IMPAIRMENT: Primary | ICD-10-CM

## 2018-01-29 PROCEDURE — G9168 MEMORY CURRENT STATUS: HCPCS | Mod: CJ,PN

## 2018-01-29 PROCEDURE — G9169 MEMORY GOAL STATUS: HCPCS | Mod: CI,PN

## 2018-01-29 PROCEDURE — 92507 TX SP LANG VOICE COMM INDIV: CPT | Mod: PN

## 2018-01-29 NOTE — PROGRESS NOTES
SLP Outpatient Progress Note     Patient name: Neptali Gonzalez  SOC Date:  07/31/2017  Primary Diagnosis: Cerebral Infarction (Left Hemisphere)  Treatment Diagnosis: Cognitive Impairment  Certification Period:  11/20/2017 to 02/05/2018     Subjective:  Patient was alert and oriented to place, day and time. He participated well in all activities and was very cooperative. SLUMS assessment was given to determine progress since beginning UP. Patient has missed multiple sessions due to work out of state.   Patient's response to therapy: Good     Objective:  Goals addressed during today's session:  1) Patient will identify problems and generate 3 solutions to each independently.   2) Patient will organize and sequence 5 step activities with min A with 90% accuracy.    3) Patient will demonstrate recall of functional information following a short-term delay with min A and 90% accuracy.   4) Patient will complete complex reasoning tasks to improve problem solving and safety awareness with 90% accuracy and min A.   5) Patient will complete checkbook/financial balancing tasks with 90% accuracy and min A.      Assessment:  Progress towards goals addressed today:  1) Patient identified problems utilizing verbal and visual picture scenarios and generated 2 solutions to each with Mod A and 60% accuracy.  2) Patient organized and sequenced 3 step activities with mod A and 60% accuracy.  3) Patient demonstrated recall of 3 pieces of information given verbally, following a short-term delay, with mod A and 70% accuracy.   * SLUMS assessment administered with score of 12/30, indicating severe cognitive impairment/Dementia  Progress toward previous goals: Continue STG/LTG      Plan:  Continue current POC   Treatment        Principle of treatment/treatment today: Cognitive-Linguistic Therapy        Treatment time: 12:00-1:00 PM     Follow Up  Follow up in:  1 week      Therapist: Rubi Matias MS/ASA-SLP  Date:  01/29/2018

## 2018-01-30 ENCOUNTER — OFFICE VISIT (OUTPATIENT)
Dept: INTERNAL MEDICINE | Facility: CLINIC | Age: 68
End: 2018-01-30
Payer: MEDICARE

## 2018-01-30 VITALS
DIASTOLIC BLOOD PRESSURE: 60 MMHG | TEMPERATURE: 98 F | HEIGHT: 66 IN | BODY MASS INDEX: 25.22 KG/M2 | SYSTOLIC BLOOD PRESSURE: 138 MMHG | HEART RATE: 62 BPM | WEIGHT: 156.94 LBS

## 2018-01-30 DIAGNOSIS — M54.31 BILATERAL SCIATICA: ICD-10-CM

## 2018-01-30 DIAGNOSIS — M54.32 BILATERAL SCIATICA: ICD-10-CM

## 2018-01-30 DIAGNOSIS — E78.2 DYSLIPIDEMIA WITH LOW HIGH DENSITY LIPOPROTEIN (HDL) CHOLESTEROL WITH HYPERTRIGLYCERIDEMIA DUE TO TYPE 2 DIABETES MELLITUS: ICD-10-CM

## 2018-01-30 DIAGNOSIS — R48.2 APRAXIA: ICD-10-CM

## 2018-01-30 DIAGNOSIS — E11.69 DYSLIPIDEMIA WITH LOW HIGH DENSITY LIPOPROTEIN (HDL) CHOLESTEROL WITH HYPERTRIGLYCERIDEMIA DUE TO TYPE 2 DIABETES MELLITUS: ICD-10-CM

## 2018-01-30 DIAGNOSIS — I70.0 AORTIC ARCH ATHEROSCLEROSIS: ICD-10-CM

## 2018-01-30 DIAGNOSIS — D69.2 SENILE PURPURA: ICD-10-CM

## 2018-01-30 DIAGNOSIS — I77.1 ILIAC ARTERY STENOSIS, RIGHT: ICD-10-CM

## 2018-01-30 DIAGNOSIS — I15.2 HYPERTENSION ASSOCIATED WITH DIABETES: ICD-10-CM

## 2018-01-30 DIAGNOSIS — I69.351 HEMIPLEGIA AND HEMIPARESIS FOLLOWING CEREBRAL INFARCTION AFFECTING RIGHT DOMINANT SIDE: ICD-10-CM

## 2018-01-30 DIAGNOSIS — E11.59 HYPERTENSION ASSOCIATED WITH DIABETES: ICD-10-CM

## 2018-01-30 DIAGNOSIS — I77.819 ECTATIC AORTA: ICD-10-CM

## 2018-01-30 DIAGNOSIS — M47.812 CERVICAL ARTHRITIS: ICD-10-CM

## 2018-01-30 PROCEDURE — 99999 PR PBB SHADOW E&M-EST. PATIENT-LVL IV: CPT | Mod: PBBFAC,,, | Performed by: INTERNAL MEDICINE

## 2018-01-30 PROCEDURE — 99215 OFFICE O/P EST HI 40 MIN: CPT | Mod: S$GLB,,, | Performed by: INTERNAL MEDICINE

## 2018-01-30 RX ORDER — ACETAMINOPHEN 500 MG
500 TABLET ORAL EVERY 6 HOURS PRN
Qty: 90 TABLET | Refills: 0 | Status: SHIPPED | OUTPATIENT
Start: 2018-01-30

## 2018-01-30 RX ORDER — LISINOPRIL 10 MG/1
10 TABLET ORAL DAILY
Qty: 90 TABLET | Refills: 3 | Status: SHIPPED | OUTPATIENT
Start: 2018-01-30 | End: 2018-12-18 | Stop reason: SDUPTHER

## 2018-01-30 NOTE — PROGRESS NOTES
"Primary Care Provider Appointment    Subjective:      Patient ID: Neptali Gonzalez is a 67 y.o. male with HTN, HLD, DM, cervical arthritis, h/o R cerebral infarction    Chief Complaint: Follow-up (need P.T. orders for right arm hemiparesis.) and Low-back Pain (left buttock pain ,onset 1 week pain 8/10)    Patient has new onset sciatic pain in L buttock radiating down the back of the leg. He has been traveling more and sitting for extended periods of time (driving 400 miles, etc).     He does not check his glucose due to poor control of UE. Last A1c was 6.1. He is compliant with metformin once daily. Kidney function is worsening, is on ACE.     He experienced stroke with residual apraxia and RUE weakness, which he was previously participating in OT but was discharged. He states speech therapy is going "really well." He states he has improved his word fluency tremendously following his stroke.    His home BP readings are around 135/90 per patient (no log presented).    Past Surgical History:   Procedure Laterality Date    CARDIAC SURGERY      CORONARY STENT PLACEMENT      LITHOTRIPSY      TONSILLECTOMY         Past Medical History:   Diagnosis Date    Coronary artery disease 2002    stent    Hypertension     Kidney stones     MI (myocardial infarction)        Review of Systems   Constitutional: Negative for activity change, appetite change, fatigue and fever.   Respiratory: Negative for shortness of breath.    Cardiovascular: Negative for chest pain and leg swelling.   Musculoskeletal: Positive for arthralgias, back pain and joint swelling.        R shoulder pain  R hand weakness   Neurological: Positive for tremors, weakness and numbness. Negative for dizziness, seizures and speech difficulty.   Psychiatric/Behavioral: Negative for confusion and decreased concentration. The patient is not nervous/anxious.        Objective:   /60 (BP Location: Left arm, Patient Position: Sitting, BP Method: Medium " "(Manual))   Pulse 62   Temp 98.4 °F (36.9 °C) (Oral)   Ht 5' 6" (1.676 m)   Wt 71.2 kg (156 lb 15.5 oz)   BMI 25.34 kg/m²     Physical Exam   Constitutional: He is oriented to person, place, and time. He appears well-developed and well-nourished.   HENT:   Head: Normocephalic.   Eyes: EOM are normal.   Musculoskeletal: Normal range of motion.   Pain with prolonged sitting during exam   Neurological: He is alert and oriented to person, place, and time.   Mild resting tremor in R hand (improved since last exam)   Skin: Skin is warm and dry.   Ecchymoses and purpura on UE bilaterally   Psychiatric: He has a normal mood and affect. His behavior is normal. Judgment and thought content normal.   Nursing note and vitals reviewed.      Lab Results   Component Value Date    WBC 10.05 05/25/2016    HGB 14.7 05/25/2016    HCT 42.9 05/25/2016     05/25/2016    CHOL 176 05/05/2017    TRIG 189 (H) 05/05/2017    HDL 36 (L) 05/05/2017    ALT 29 05/25/2016    AST 24 05/25/2016     11/27/2017    K 4.3 11/27/2017     11/27/2017    CREATININE 1.5 (H) 11/27/2017    BUN 21 11/27/2017    CO2 27 11/27/2017    INR 1.1 05/25/2016    HGBA1C 6.1 (H) 11/27/2017         Assessment:   67 y.o. male with multiple co-morbid illnesses here to continue work-up of chronic issues notably HTN, HLD, DM, cervical arthritis, h/o R cerebral infarction      Plan:     Problem List Items Addressed This Visit        Neuro    Apraxia     Apraxia of R hand  · Follow-up Neuro  · Continue tertiary stroke prevention         Relevant Orders    Ambulatory Referral to Physical/Occupational Therapy    Hemiplegia and hemiparesis following cerebral infarction affecting right dominant side     MRI L hemisphere infarction  · Continue teritary stroke prevention  · Continue OT and speech therapy  · F/U Neuro         Relevant Medications    acetaminophen (TYLENOL) 500 MG tablet    Other Relevant Orders    Ambulatory Referral to Physical/Occupational " Therapy       Cardiac/Vascular    Dyslipidemia with low high density lipoprotein (HDL) cholesterol with hypertriglyceridemia due to type 2 diabetes mellitus     HDL 37, but ASCVD risk high, compliant with atorvastatin 40mg  · Continue statin   · Continue metformin 500mg   · Increase to BID pending kidney function and A1c  · Continue glipizide 5mg         Aortic arch atherosclerosis     Aortic arch atherosclerosis on CTA head/neck on 5/5/17. Compliant with high intensity statin, ASA  · Continue statin, ASA  · Improve BP control  · Bring BP log to appts  · Consider cardiology consult once acute issues addressed         Ectatic aorta     Abdominal aorta ectasis seen on AA US 5/2017  · Continue secondary prevention with APT, statin, BP, DM control  · Will refer to Cardiology in future         Iliac artery stenosis, right     R iliac artery stenosis seen on AA US in 5/2017, stent placed [place] in 2007  · Cardiology referral once acute issues addressed  · Patient prefers in 2018  · Continue tertiary prevention with APT, statin, BP control, DM control         Hypertension associated with diabetes     BP controlled on ACE, CCB  · Continue amlodipine 10mg, lisinopril 5mg  · Continue statin & ASA         Relevant Medications    lisinopril 10 MG tablet       Hematology    Senile purpura     Ecchymoses and pupura on UE bilaterally  · Continue APT  · Advised mindfulness of movements            Endocrine    Uncontrolled secondary diabetes mellitus with stage 3 CKD (GFR 30-59)     GFR 51.6, A1c 6.1 on 11/2017  · Continue metformin 500mg XR  · Monitor kidney function q3 mos  · Continue ACE  · Check urine protein/cr ratio         Relevant Medications    lisinopril 10 MG tablet       Orthopedic    Cervical arthritis     Cervical spine degenerative disc disease at C3-C4, C5-C6, and C6-C7.  Uncovertebral joint osteophytes bilaterally at C3-C4 and C5-C6 and C6-C7. Weakness of R hand attributed to cervical disease,  minimal neck pain  · Continue OT per Neuro  · Discharged with improvement in symptoms         Relevant Medications    acetaminophen (TYLENOL) 500 MG tablet    Bilateral sciatica     Acute sciatica from extended periods of immobility  · PT referral to [facility]  · Supportive care: heat therapy, continue activity, tylenol for pain   · Avoid NSAIDs due to CKD         Relevant Medications    acetaminophen (TYLENOL) 500 MG tablet    Other Relevant Orders    Ambulatory Referral to Physical/Occupational Therapy          Health Maintenance       Date Due Completion Date    Colonoscopy 10/12/1999 ---NEXT, did not tolerate iFOBT    TETANUS VACCINE 10/05/2018 (Originally 10/12/1967) ---    Lipid Panel 05/05/2018 5/5/2017    Hemoglobin A1c 05/27/2018 11/27/2017    Foot Exam 06/27/2018 6/27/2017    Eye Exam 07/18/2018 7/18/2017    Override on 5/23/2017: Done (Eye Cam done)    Pneumococcal (65+) (2 of 2 - PPSV23) 08/22/2018 8/22/2017          No Follow-up on file. . One hour spent with this patient today, half of that in counseling.    Karen Chacon MD/MPH  Internal Medicine  [facility]  [phone]

## 2018-01-30 NOTE — PATIENT INSTRUCTIONS
TODAY:  - increase lisinopril to 10mg  - physical therapy in [place] for low back  - occupational therapy in [place] for right arm weakness  - continue speech therapy  - tylenol script sent to pharmacy  - avoid NSAIDs (ibuprofen, aleve) due to kidney disease  - bring blood pressure log to next appt    NEXT:  - consider colonoscopy

## 2018-01-30 NOTE — ASSESSMENT & PLAN NOTE
GFR 51.6, A1c 6.1 on 11/2017  · Continue metformin 500mg XR  · Monitor kidney function q3 mos  · Continue ACE  · Check urine protein/cr ratio

## 2018-01-30 NOTE — ASSESSMENT & PLAN NOTE
Acute sciatica from extended periods of immobility  · PT referral to [facility]  · Supportive care: heat therapy, continue activity, tylenol for pain   · Avoid NSAIDs due to CKD

## 2018-01-30 NOTE — ASSESSMENT & PLAN NOTE
R iliac artery stenosis seen on AA US in 5/2017, stent placed at [facility] in 2007  · Cardiology referral once acute issues addressed  · Patient prefers in 2018  · Continue tertiary prevention with APT, statin, BP control, DM control

## 2018-01-30 NOTE — ASSESSMENT & PLAN NOTE
MRI L hemisphere infarction  · Continue teritary stroke prevention  · Continue OT and speech therapy  · F/U Neuro

## 2018-02-05 ENCOUNTER — CLINICAL SUPPORT (OUTPATIENT)
Dept: REHABILITATION | Facility: HOSPITAL | Age: 68
End: 2018-02-05
Attending: INTERNAL MEDICINE
Payer: MEDICARE

## 2018-02-05 DIAGNOSIS — R41.89 COGNITIVE IMPAIRMENT: Primary | ICD-10-CM

## 2018-02-05 PROCEDURE — G9169 MEMORY GOAL STATUS: HCPCS | Mod: CI,PN

## 2018-02-05 PROCEDURE — 92507 TX SP LANG VOICE COMM INDIV: CPT | Mod: PN

## 2018-02-05 PROCEDURE — G9168 MEMORY CURRENT STATUS: HCPCS | Mod: CJ,PN

## 2018-02-05 NOTE — PLAN OF CARE
SPEECH THERAPY UPDATED PLAN OF TREATMENT     Patient name: Neptali Gonzalez  SOC Date:  07/31/2017  Primary Diagnosis: Cerebral Infarction (Left Hemisphere)  Treatment Diagnosis: Cognitive Impairment  Certification Period:   02/05/2018  to 05/28/2018     Referring Provider:  Karen Chacon MD  [id number] [place] [address]      SUBJECTIVE:  Neptali Gonzalez is a 67 y/o male who presents with mild-moderate cognitive impairment. Deficits noted include decreased short term memory, higher-level problem solving deficits, decreased executive planning and organization, and decreased safety awareness. Patient has attended majority of sessions, with the exception of holidays and recent out of town work. He has no further events scheduled to impede his ability to attend and is very motivated to improve. Patient has exhibited progress towards all goals with skilled therapy sessions and has the potential to meet all goals with continued therapy.      OBJECTIVE:  Previous Short Term Goals Status:  Progressing/Continue  Short Term Goals:  1) Patient will identify problems and generate 3 solutions to each independently. Progressing/Continue  2) Patient will organize and sequence 5 step activities with min A with 90% accuracy.  Progressing/Continue  3) Patient will demonstrate recall of functional information following a short-term delay with min A and 90% accuracy. Progressing/Continue  4) Patient will complete complex reasoning tasks to improve problem solving and safety awareness with 90% accuracy and min A. Progressing/Continue  5) Patient will complete checkbook/financial balancing tasks with 90% accuracy and min A.  Progressing/Continue     Long Term Goal Status:  Progressing/Continue  1) Patient will improve and demonstrate increased cognitive and executive function skills (planning, organizing, self-awareness, problem solving) during daily activities to maximize safety and function in  current living environment.       Rehab Potential: good      ASSESSMENT:  Reasons for Recertification of Therapy:    Patient to continue to be treated for cognitive impairment 1x/week for 16 weeks to target STM deficits, higher-level problem solving, and executive function deficits in order to maximize safety and function. Without continued therapy, patient at risk for further decline, decreased safety awareness and overall decreased quality of life.     PLAN:  Recommended Treatment Plan: 1 time per week for 16 weeks  Certification Period:  02/05/2018  to 05/28/2018     Therapist's Name: Rubi Matias MS CCC-SLP  Date: 02/05/2018     I CERTIFY THE NEED FOR THESE SERVICES FURNISHED UNDER THIS PLAN OF TREATMENT AND WHILE UNDER MY CARE     Physician's comments: ________________________________________________________________________________________________________________________________________________        Physician's Name:  Karen Chacon MD

## 2018-02-19 ENCOUNTER — CLINICAL SUPPORT (OUTPATIENT)
Dept: REHABILITATION | Facility: HOSPITAL | Age: 68
End: 2018-02-19
Attending: INTERNAL MEDICINE
Payer: MEDICARE

## 2018-02-19 DIAGNOSIS — R41.89 COGNITIVE IMPAIRMENT: Primary | ICD-10-CM

## 2018-02-19 PROCEDURE — G9169 MEMORY GOAL STATUS: HCPCS | Mod: CI,PN

## 2018-02-19 PROCEDURE — 92507 TX SP LANG VOICE COMM INDIV: CPT | Mod: PN

## 2018-02-19 PROCEDURE — G9168 MEMORY CURRENT STATUS: HCPCS | Mod: CJ,PN

## 2018-02-26 ENCOUNTER — CLINICAL SUPPORT (OUTPATIENT)
Dept: REHABILITATION | Facility: HOSPITAL | Age: 68
End: 2018-02-26
Attending: INTERNAL MEDICINE
Payer: MEDICARE

## 2018-02-26 DIAGNOSIS — M62.89 MUSCLE HYPERTONICITY: ICD-10-CM

## 2018-02-26 DIAGNOSIS — R27.8 COORDINATION IMPAIRMENT: ICD-10-CM

## 2018-02-26 DIAGNOSIS — R68.89 IMPAIRED FUNCTION OF UPPER EXTREMITY: ICD-10-CM

## 2018-02-26 DIAGNOSIS — M25.611 STIFFNESS OF JOINT, SHOULDER REGION, RIGHT: ICD-10-CM

## 2018-02-26 DIAGNOSIS — R41.89 COGNITIVE IMPAIRMENT: Primary | ICD-10-CM

## 2018-02-26 DIAGNOSIS — R29.898 RIGHT ARM WEAKNESS: ICD-10-CM

## 2018-02-26 PROCEDURE — G8985 CARRY GOAL STATUS: HCPCS | Mod: CJ,PN

## 2018-02-26 PROCEDURE — G8984 CARRY CURRENT STATUS: HCPCS | Mod: CK,PN

## 2018-02-26 PROCEDURE — 92507 TX SP LANG VOICE COMM INDIV: CPT | Mod: PN

## 2018-02-26 PROCEDURE — G9168 MEMORY CURRENT STATUS: HCPCS | Mod: CJ,PN

## 2018-02-26 PROCEDURE — 97166 OT EVAL MOD COMPLEX 45 MIN: CPT | Mod: PN

## 2018-02-26 PROCEDURE — G9169 MEMORY GOAL STATUS: HCPCS | Mod: CI,PN

## 2018-02-26 NOTE — PROGRESS NOTES
SLP Outpatient Progress Note    Patient name: Neptali Gonzalez  SOC Date:  07/31/2017  Primary Diagnosis: Cerebral Infarction (Left Hemisphere)  Treatment Diagnosis: Cognitive Impairment  Certification Period:   02/05/2018  to 05/28/2018     SUBJECTIVE:  Neptali Gonzalez is a 67 y/o male who presents with mild-moderate cognitive impairment. Deficits noted include decreased short term memory, higher-level problem solving deficits, decreased executive planning and organization, and decreased safety awareness. Patient was alert and cooperative. He participated well in all therapy activities, and exhibited improvements in sequencing and organizing tasks.     OBJECTIVE:  Goals Addressed During Today's Session:  1) Patient will identify problems and generate 3 solutions to each independently.   2) Patient will organize and sequence 5 step activities with min A with 90% accuracy.   3) Patient will demonstrate recall of functional information following a short-term delay with min A and 90% accuracy.  4) Patient will complete complex reasoning tasks to improve problem solving and safety awareness with 90% accuracy and min A.  5) Patient will complete checkbook/financial balancing tasks with 90% accuracy and min A.     ASSESSMENT:  Progress Towards Goals Addressed Today:  1.1) Patient identified problems present in verbal and visual picture scenarios and generated 2 solutions to each with Mod A and 65% accuracy.  2) Patient organized and sequenced 3 step activities with mod A and 65% accuracy.  3) Patient demonstrated recall of 3 pieces of information given verbally, following a short-term delay, with mod A and 70% accuracy.      PLAN:  Continue current POC  Recommended Treatment Plan: 1 time per week for 16 weeks  Certification Period:  02/05/2018  to 05/28/2018     Therapist's Name: Rubi Matias MS CCC-SLP  Date: 02/26/2018

## 2018-02-26 NOTE — PROGRESS NOTES
Occupational Therapy   Evaluation    Neptali Gonzalez   MRN: [id number]     OT eval complete. Please see attached POC for details. Thank you for consult!    G-Codes: carry/handle (R UE ROM, MMT, /pinch strength, coordination)  Current: 40%-60% (CK)  Goal: 20%-40% (CJ)      Fabi Gillis, ALEXANDRES  2/26/18      I certify that I was present in the room directing the student in service delivery and guiding them using my skilled judgment. As the co-signing therapist I have reviewed the students documentation and am responsible for the treatment, assessment, and plan.   Krystle Holloway, ALEXANDRE, LOTR  2/26/2016

## 2018-02-28 PROBLEM — R27.8 COORDINATION IMPAIRMENT: Status: ACTIVE | Noted: 2018-02-26

## 2018-02-28 PROBLEM — M62.89 MUSCLE HYPERTONICITY: Status: ACTIVE | Noted: 2018-02-26

## 2018-02-28 PROBLEM — M25.611 STIFFNESS OF JOINT, SHOULDER REGION, RIGHT: Status: ACTIVE | Noted: 2018-02-26

## 2018-02-28 PROBLEM — R68.89 IMPAIRED FUNCTION OF UPPER EXTREMITY: Status: ACTIVE | Noted: 2018-02-26

## 2018-02-28 PROBLEM — R29.898 RIGHT ARM WEAKNESS: Status: ACTIVE | Noted: 2018-02-26

## 2018-03-01 NOTE — PLAN OF CARE
Occupational Therapy   Evaluation    Neptali Gonzalez   MRN: [id number]   Referring Physician:  Karen Chacon MD    Date: 02/26/2018  Start Time:  1212  Stop Time:  1307       UNTIMED  Procedure Time Min.   Eval (mod) Start:  Stop: 55     Total Timed Minutes:  0  Total Timed Units:  0  Total Untimed Units:  1  Charges Billed/# of units:  1    Past Medical History:   Diagnosis Date    Coronary artery disease 2002    stent    Hypertension     Kidney stones     MI (myocardial infarction)      Past Surgical History:   Procedure Laterality Date    CARDIAC SURGERY      CORONARY STENT PLACEMENT      LITHOTRIPSY      TONSILLECTOMY       Review of patient's allergies indicates: No Known Allergies    Current Outpatient Prescriptions:     acetaminophen (TYLENOL) 500 MG tablet, Take 1 tablet (500 mg total) by mouth every 6 (six) hours as needed for Pain., Disp: 90 tablet, Rfl: 0    amlodipine (NORVASC) 10 MG tablet, TAKE 1 TABLET(10 MG) BY MOUTH EVERY DAY, Disp: 90 tablet, Rfl: 3    aspirin (ECOTRIN) 81 MG EC tablet, Take 81 mg by mouth once daily., Disp: , Rfl:     atorvastatin (LIPITOR) 40 MG tablet, Take 1 tablet (40 mg total) by mouth once daily., Disp: 90 tablet, Rfl: 11    blood sugar diagnostic (ACCU-CHEK RODRIGO) Strp, 1 strip by Misc.(Non-Drug; Combo Route) route once daily., Disp: 200 strip, Rfl: 3    blood-glucose meter kit, Use as instructed, Disp: 1 each, Rfl: 0    glipiZIDE (GLUCOTROL) 5 MG TR24, Take 1 tablet (5 mg total) by mouth daily with breakfast., Disp: 90 tablet, Rfl: 3    ketoconazole (NIZORAL) 2 % cream, Apply topically once daily. toes, Disp: 30 g, Rfl: 1    lancets Misc, 1 lancet by Misc.(Non-Drug; Combo Route) route once daily at 6am., Disp: 200 each, Rfl: 3    lisinopril 10 MG tablet, Take 1 tablet (10 mg total) by mouth once daily., Disp: 90 tablet, Rfl: 3    metformin (GLUCOPHAGE-XR) 500 MG 24 hr tablet, Take 1 tablet (500 mg total) by mouth daily with breakfast., Disp:  "90 tablet, Rfl: 3    TRUEPLUS LANCETS 33 gauge Misc, , Disp: , Rfl:     Signs of Abuse: None evident    Nutrition: Appears WFL    Diagnosis:   Encounter Diagnoses   Name Primary?    Stiffness of joint, shoulder region, right     Coordination impairment     Impaired function of upper extremity     Muscle hypertonicity     Right arm weakness      Onset date: 2016/2017; specific date unknown.  Orders: Eval and Treat    Precautions: Standard  MRI: "Abnormal cortical T2/flair hyperintensity is identified involving the left frontal, parietal and superior temporal lobes along with focal volume loss in the left posterior parietal lobe consistent with a chronic left MCA territory infarct. Small foci of magnetic susceptibility are identified in the parietal cortex, consistent with small areas of chronic cortical laminar necrosis. Chronic left caudate infarct also noted."-4/20/17  Dominant hand: right    Subjective:     Chief Complaint: R arm and hand are "still giving me trouble. Can't really do a bunch of stuff with it."     Past treatment includes: OP OT with [facility] for "a few months now."   He received therapy in the hospital as well.     Social Hx: Pt lives alone in a trailer w/ 3 JOANNA and handrails on both sides.  He has a good friend that lives in [place] that checks up on him.  Bathroom setup is tub/shower combo.     Prior Level of Function: Pt independent w/ BADL's and IADL's.  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     Current Level of Function: Pt independent w/ BADL's and IADL's, but with difficulty. Pt was working for [employer] and [employer] doing A/V work, but is not currently working for [employer].  Pt still has pony.       Occupation/hobbies/homemaking: Pt enjoys playing guitar, piano, and his pony.      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  A/V work for [employer] & [employer]; tasks " "related to pony rides including care of pony, transportation of pony, and lifting children on/off animal  Driving status: Yes    DME: None  Home access: Pt lives alone in a trailer w/ 3 STO and handrails on both sides.    Pain:  5/10 in R hand and forearm (constant, pt w/ [facility] CTS).      Patient goals: "To get my hand working again.  Working so I can play guitar and write."     Objective:     Cognition/Perception:   Hearing:  intact  Communication:  intact  Vision: See Below  Oriented: Person, Place, Time and Situation (Pt oriented to day of week, month, year, but incorrect date.)  Attention:  intact  Follows Commands: Follows one-step commands  Coping skills/emotional control: Appropriate to situation    Visual/perceptual:  No recent changes in vision.  Pt went to see an optometrist 3-4 months ago, and eyesight "was still ok."     Physical:  Posture: Posterior/neutral pelvic tilt, shoulder slightly forward, L scap abducted (R scap to spine ~4 fingers, L scap to spine ~5 fingers), L scap depressed compared to R, rounded shoulders.   Joint integrity/ Subluxation: No sublux noted in either shoulder.      Sensation: Neptali reports intact sensation on both sides.  Light touch: elsa intact, same on both sides   Proprioception: R UE impaired- 1 on Thumb Localization Test (TLT).  Temperature: Pt reports ability to distinguish b/w cold and hot.    UE Movement and Coordination:  Hand Domination:  right  Affected Extremity:  right   Coordination: Finger to Nose Test=R UE slightly impaired, hit top of nose and slid to tip. L UE intact    Tone:  Hypertonic- pt unable to relax arm   Modified Marialuisa Scale: 1+ Slight increases in muscle tone, manifested by a catch, followed by minimal resistance throughout the remainder (less than half) of the ROM.    GMC: R UE Max; L UE Mod Impaired   Box & Block Test: L UE=41, R UE=23    FMC: L UE mod impaired; R UE Max impaired  9-Hole Peg Test: L UE=37.9s, R UE= placed 5 pegs in 2 min, used L " UE to assist w/ 1st peg     Range of Motion/ Strength:  R UE: active movement in (shrugs, pro/retract, sh flex/ext, sh abd, elbow flex/ext (although not full ext ~4/5) pro/sup (~4/5 supination), wrist flex/ext, radial/ulnar deviation (ulnar dev coordination difficult), ER behind head (palm just post to ear w/ head turned slightly to left), FROM IR= L4, digit flex/ext    L UE AROM: WFL    Hand Strength (pt self positioned R hand)   (lbs) Right Left    1 40   45     2 40   46     3 30  54    AVG 36.6 48.3         Pinch Right Left   Lateral 8   11   Tip (2 point) 5 11.5   Tip (3 point) 7 11.5     Balance:  Static Sitting:  good   Static Standing:  good   Dynamic Sitting:  good   Dynamic Standing:  fair     ADL's: (per pt report)  Feeding: I  Grooming: I  Hygiene: I  UB Dressing: I  LB Dressing: I  Toileting: I  Bathing: I    Assessment:     Neptali Gonzalez presents with a moderate complexity OT evaluation. He required expanded review of medical history and occupational profile. Pt w/ deficits as noted below limit his ability to engage at his prior level of function. Clinical decision making required moderate analytical complexity due to occupational profile factors, data from detailed assessments, several treatment options and comorbidities affecting occupational performance, including (but not limited to) kidney stones, cervical radicular pain, apraxia, atherosclerosis, R hemiparesis, cervical arthritis, uncontrolled DM II w/ stage 3 CKD, ectatic aorta, iliac artery stenosis, R RC injury, HTN, elsa coronary artery disease, seborrheic keratosis, and elsa sciatica. Mod modifications were required to complete the assessment. 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        PROBLEM LIST/IMPAIRMENTS:   weakness, impaired  endurance, impaired self care skills, impaired functional mobility, decreased coordination, decreased upper extremity function, abnormal tone, decreased ROM, impaired coordination, impaired fine motor and impaired joint extensibility    Patient Education/Response:     1. Neptali performed and was provided with a written copy of ER stretches on tabletop to perform at home, 10x30s 3x/day. Exercises were reviewed and Neptali was able to demonstrate them prior to the end of the session.     Plans and Goals:     LTG GOALS:  Time frame: 12 weeks (2/26/18-5/21)  1) Pt will demonstrate ability to write The cat jumped over the lazy dog legibly w/ the R hand.  2) Pt will demonstrate ability to complete the 9HPT with the R UE in under 1:30  3) Pt will demonstrate improvement in R UE gross motor coordination, as evidenced by a B&B score of at least 40.  4) Pt will report ability to play a 4-bar ana maria on the guitar, picking the correct strings w/ the R UE.  5) Pt will demonstrate increased coordination of the R UE, as evidenced by ability to play a chord on the piano w/ the R UE.      STG Goals:  Time frame: 4 weeks (2/26/18-3/26/18)  1) Pt will report at least 75% compliance w/ HEP.  2) Pt will report ability to play a chord on the guitar, strumming the correct strings w/ the R UE.  3) Pt will report ability to play a one-octave scale on the guitar, picking the correct strings w/ the R UE.  4) Pt will demonstrate ability to play a one-octave scale on the piano w/ the R UE.   5) Pt will demonstrate ability to legibly write name w/ R UE.    Patient/caregiver understands and agrees with plan of care.    Certification Period: 12 weeks (2/26/18-5/21)  Outpatient occupational therapy 1  time weekly for 12 weeks (2/26/18-5/21) to include: Therapeutic Exercise, Functional Activities, Patient Education, Home Exercise Program, ADL Training, Transfer/Mobility Training, Ultrasound/Phonophoresis, Electrical Stimulation/TENS/Interferential,  Moist Heat/Ice/Paraffin, Sensory/Neuromuscular Reeducation, Functional Standing, Cognitive Preceptual Retraining and Manual Therapy  and any other treatment approaches deemed necessary to assist Neptali in meeting his goals.      ALEXANDRE MarshS  2/26/18    I certify that I was present in the room directing the student in service delivery and guiding them using my skilled judgment. As the co-signing therapist I have reviewed the students documentation and am responsible for the treatment, assessment, and plan.   Krystle Holloway, ALEXANDRE, LOTR  2/26/2018    I certify the need for these services furnished under this plan of treatment and while under my care.  ____________________________________ Physician/Referring     ____________________   Practitioner Date of Signature

## 2018-03-01 NOTE — PATIENT INSTRUCTIONS
Stretch to External Rotation on Table        Place affected arm on table,  with elbow slightly bent. Gently lean forward until you feel a stretch in the shoulder. You should only feel a stretch at no pain. Hold 30 seconds. 10 reps per set, 2 sets per day, 7 days per week

## 2018-03-16 ENCOUNTER — CLINICAL SUPPORT (OUTPATIENT)
Dept: REHABILITATION | Facility: HOSPITAL | Age: 68
End: 2018-03-16
Attending: INTERNAL MEDICINE
Payer: MEDICARE

## 2018-03-16 DIAGNOSIS — R27.8 COORDINATION IMPAIRMENT: ICD-10-CM

## 2018-03-16 DIAGNOSIS — R29.898 RIGHT ARM WEAKNESS: ICD-10-CM

## 2018-03-16 DIAGNOSIS — M25.611 STIFFNESS OF JOINT, SHOULDER REGION, RIGHT: ICD-10-CM

## 2018-03-16 DIAGNOSIS — R68.89 IMPAIRED FUNCTION OF UPPER EXTREMITY: ICD-10-CM

## 2018-03-16 DIAGNOSIS — M62.89 MUSCLE HYPERTONICITY: ICD-10-CM

## 2018-03-16 PROCEDURE — 97112 NEUROMUSCULAR REEDUCATION: CPT | Mod: PN

## 2018-03-16 PROCEDURE — 97140 MANUAL THERAPY 1/> REGIONS: CPT | Mod: PN

## 2018-03-16 NOTE — PATIENT INSTRUCTIONS
Sit on a hard surface and position your hands at your sides. Try and keep both hands flat on the surface at your side.  (Elevate them if necessary using yoga blocks or shoe boxes stuffed with a pillow or blanket and taped up. Place a piece of  under and over the box/block to make sure it doesnt slip.)     Anterior and Posterior Pelvic Tilt  Roll your pelvis forward so that you sit up tall (anterior tilt) then roll your pelvis backward so youre slouched (posterior tilt). Make your movements as big as possible. Repeat about 20 times. Do 5 sets throughout the day.                                                Lateral Pelvic Tilt  Sitting up tall with an anterior pelvic tilt, lift your right hip up toward your right armpit (DO NOT lean way over to the side). Then return to neutral. Lift the left hip up toward your left armpit. Repeat about 20 times. Do 5 sets throughout the day.

## 2018-03-16 NOTE — PROGRESS NOTES
"Occupational Therapy-Treatment    Date:  3/16/2018  Start Time: 1300  Stop Time: 1355    TIMED  Procedure Time Min.   Manual (1)  8   Neuro Re-ed (3)  47   Total Timed Minutes:  55  Total Timed Units:  4  Total Untimed Units:  0  Charges Billed/# of units:  4    Progress/Current Status    Subjective:     Patient ID: Neptali Gonzalez is a 67 y.o. male.  Diagnosis:   Encounter Diagnoses   Name Primary?    Stiffness of joint, shoulder region, right     Coordination impairment     Impaired function of upper extremity     Muscle hypertonicity     Right arm weakness       Pain: 0/10. "I feel fine."  Pt just travelled in from [facility] for work.   He reports work has been busy, but has been able to perform R UE ER stretches on tabletop.     Objective:     Mobilization of shoulder, including levator, anterior complex, lats and combined internal rotators.  Facilitation of a/p pelvic tilt, R/L lateral pelvic tilt. 1x20 each. Points of contact at sternum/midback for ant/post tilt, and R/L QL for lateral tilts. Hands at sides pushing through blocks in position of support to facilitate scapulo-pelvic rhythm.  Mobilization of pelvis to improve R lateral pelvic tilt.  Repeated facilitation of R/L lateral pelvic tilt. 1x25. Hands at sides in position of support to facilitate scapulo-pelvic rhythm.  Sitting @ EOM, forward/backward unilateral scooting w/ hands @ sides on blocks in position of support, 1x10. Cues given to increase lateral hip tilt and decrease compensatory trunk leaning.  Sitting @ EOM, forward/backward elsa scooting w/ hands @ sides on blocks in position of support, 1x10.  Tactile facilitation given @ R distal quad & R QL.  Core exercise sitting @ EOM with hands at sides pushing through blocks in position of support, elsa leg lifts onto 2" block, 3x10.  Difficulty increased by increasing size of block as well as maintaining good posture w/o backwards leaning to increase core involvement, 1x10 & 1x15.  "   Core exercise sitting @ EOM with hands at sides pushing through blocks in position of support, elsa LE straight leg lifts, 2x10. Cues for postural awareness.  Facilitation of R UE ER standing with tilt stick, 5x10. To increase difficulty, added resistance of rubber bands.  Cues to decrease compensatory trunk rotation and for slow, controlled movements.   Seated @ chair, facilitation of coordinated sh flex, elbow ext and ER for effective overhead reach w/ bike flag for AAROM, with bivalve to facilitate maintenance of elbow ext throughout ROM.    Assessment:     Benjy did well in therapy today, and reported completing his tabletop ER HEP.  He showed improvement with pelvic exercises and scooting with repetition, but still required cues to increase R lateral hip hike and decrease compensatory trunk movements.  He did well with core exercises, but still demonstrates weakness in his core, limiting his ability to functionally use his R UE.  During ER, his R UE would waiver 2^ decreased coordination. Moreover, during facilitated sh flex, pt was only able to keep ROM arc b/w 45*-110* sh flex.  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        LTG GOALS:  Time frame: 12 weeks (2/26/18-5/21)  1) Pt will demonstrate ability to write The cat jumped over the lazy dog legibly w/ the R hand.  2) Pt will demonstrate ability to complete the 9HPT with the R UE in under 1:30  3) Pt will demonstrate improvement in R UE gross motor coordination, as evidenced by a B&B score of at least 40.  4) Pt will report ability to play a 4-bar ana maria on the guitar, picking the correct strings w/ the R UE.  5) Pt will demonstrate increased coordination of the R UE, as evidenced by ability to play a chord on the piano w/ the R UE.        STG Goals:  Time  frame: 4 weeks (2/26/18-3/26/18)  1) Pt will report at least 75% compliance w/ HEP.  2) Pt will report ability to play a chord on the guitar, strumming the correct strings w/ the R UE.  3) Pt will report ability to play a one-octave scale on the guitar, picking the correct strings w/ the R UE.  4) Pt will demonstrate ability to play a one-octave scale on the piano w/ the R UE.   5) Pt will demonstrate ability to legibly write name w/ R UE.    Patient Education/Response:     Continue HEP. Added pelvic exercises 5x20/day. Pt v/u, received handout, and returned demonstration.     Plans and Goals:     Continue core strengthening, R UE AROM, assess for possibility of planks/quadruped.    ALEXANDRE MarshS  3/16/2018    I certify that I was present in the room directing the student in service delivery and guiding them using my skilled judgment. As the co-signing therapist I have reviewed the students documentation and am responsible for the treatment, assessment, and plan.   Krystle Holloway, ALEXANDRE, LOTR  3/16/2018

## 2018-03-23 ENCOUNTER — CLINICAL SUPPORT (OUTPATIENT)
Dept: REHABILITATION | Facility: HOSPITAL | Age: 68
End: 2018-03-23
Attending: INTERNAL MEDICINE
Payer: MEDICARE

## 2018-03-23 DIAGNOSIS — M25.611 STIFFNESS OF JOINT, SHOULDER REGION, RIGHT: ICD-10-CM

## 2018-03-23 DIAGNOSIS — R27.8 COORDINATION IMPAIRMENT: ICD-10-CM

## 2018-03-23 DIAGNOSIS — R68.89 IMPAIRED FUNCTION OF UPPER EXTREMITY: ICD-10-CM

## 2018-03-23 DIAGNOSIS — M62.89 MUSCLE HYPERTONICITY: ICD-10-CM

## 2018-03-23 DIAGNOSIS — R29.898 RIGHT ARM WEAKNESS: ICD-10-CM

## 2018-03-23 PROCEDURE — G8984 CARRY CURRENT STATUS: HCPCS | Mod: CK,PN

## 2018-03-23 PROCEDURE — G8985 CARRY GOAL STATUS: HCPCS | Mod: CJ,PN

## 2018-03-23 PROCEDURE — 97140 MANUAL THERAPY 1/> REGIONS: CPT | Mod: PN

## 2018-03-23 PROCEDURE — 97112 NEUROMUSCULAR REEDUCATION: CPT | Mod: PN

## 2018-03-23 NOTE — PROGRESS NOTES
"Occupational Therapy-Treatment & Status Update    Date:  3/23/2018  Start Time: 1315  Stop Time: 1400    TIMED  Procedure Time Min.   Manual (1)  20   Neuro Re-ed (2)  25   Total Timed Minutes:  45  Total Timed Units:  3  Total Untimed Units:  0  Charges Billed/# of units:  3    Progress/Current Status    Subjective:     Patient ID: Neptali Gonzalez is a 67 y.o. male.  Diagnosis:   Encounter Diagnoses   Name Primary?    Stiffness of joint, shoulder region, right     Coordination impairment     Impaired function of upper extremity     Muscle hypertonicity     Right arm weakness       Pain:   Marko states the right arm is "working about the same."  He states he has been doing the stretches on the table.    Objective:     Table stretch to ER x5, cues for technique, positioning and time to hold stretch  Mobilization to improve ROM toward scap adduction, 2x5; abduction appears WFL  Facilitation of active scap retraction  fxl IR reaching behind back w/ facilitation of scap retraction  Mobilization of shoulder, including levator, anterior complex, lats and combined internal rotators.  Facilitation of active ER seated EOM w/ tilt-stick. Progressed from ROM to resistance w/ 2 purple rubber band chains.   Facilitation of a/p pelvic tilt, R/L lateral pelvic tilt w/ 2" fulcrum on tilt board. Hands at sides in position of support to facilitate scapulo-pelvic rhythm.  sidelying reach w/ facilitation of scap rotation  Supine for mobilization of shoulder to improve flex and ER with rotational movements of trunk on scapulae.  Supine facilitation of overhead reach w/ wheel, but only to about 90* sh flexion due to PROM limitations    Assessment:     This is only Marko's 2nd visit since eval. He continues to require at least min cues for performance during stretch to ER @ table. Due to decreased memory for HEP completion and correct techniques, Marko gonzalez's progress more slowly than would be optimal. 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      LTG GOALS:  Time frame: 12 weeks (2/26/18-5/21)  1) Pt will demonstrate ability to write The cat jumped over the lazy dog legibly w/ the R hand.  2) Pt will demonstrate ability to complete the 9HPT with the R UE in under 1:30  3) Pt will demonstrate improvement in R UE gross motor coordination, as evidenced by a B&B score of at least 40.  4) Pt will report ability to play a 4-bar ana maria on the guitar, picking the correct strings w/ the R UE.  5) Pt will demonstrate increased coordination of the R UE, as evidenced by ability to play a chord on the piano w/ the R UE.      STG Goals:  Time frame: 4 weeks (2/26/18-3/26/18)  1) Pt will report at least 75% compliance w/ HEP.  2) Pt will report ability to play a chord on the guitar, strumming the correct strings w/ the R UE.  3) Pt will report ability to play a one-octave scale on the guitar, picking the correct strings w/ the R UE.  4) Pt will demonstrate ability to play a one-octave scale on the piano w/ the R UE.   5) Pt will demonstrate ability to legibly write name w/ R UE.    Patient Education/Response:     Continue HEP, including shoulder exs and pelvic exs.     Plans and Goals:     Continue core strengthening, R UE AROM, assess for possibility of planks/quadruped.    Krystle Holloway, MOT, LOTR   3/23/2018

## 2018-04-06 ENCOUNTER — CLINICAL SUPPORT (OUTPATIENT)
Dept: REHABILITATION | Facility: HOSPITAL | Age: 68
End: 2018-04-06
Attending: INTERNAL MEDICINE
Payer: MEDICARE

## 2018-04-06 DIAGNOSIS — M25.611 STIFFNESS OF JOINT, SHOULDER REGION, RIGHT: ICD-10-CM

## 2018-04-06 DIAGNOSIS — M62.89 MUSCLE HYPERTONICITY: ICD-10-CM

## 2018-04-06 DIAGNOSIS — R68.89 IMPAIRED FUNCTION OF UPPER EXTREMITY: ICD-10-CM

## 2018-04-06 DIAGNOSIS — R27.8 COORDINATION IMPAIRMENT: ICD-10-CM

## 2018-04-06 DIAGNOSIS — R29.898 RIGHT ARM WEAKNESS: ICD-10-CM

## 2018-04-06 PROCEDURE — G8984 CARRY CURRENT STATUS: HCPCS | Mod: CK,PN

## 2018-04-06 PROCEDURE — 97112 NEUROMUSCULAR REEDUCATION: CPT | Mod: PN

## 2018-04-06 PROCEDURE — G8985 CARRY GOAL STATUS: HCPCS | Mod: CJ,PN

## 2018-04-06 NOTE — PROGRESS NOTES
"Occupational Therapy    Date:  4/6/2018  Start Time: 1312  Stop Time: 1400    TIMED  Procedure Time Min.   Manual (0)  10   Neuro Re-ed (3)   38   Total Timed Minutes:  45  Total Timed Units:  3  Total Untimed Units:  0  Charges Billed/# of units:  3    Progress/Current Status    Subjective:     Patient ID: Neptali Gonzalez is a 67 y.o. male.  Diagnosis:   Encounter Diagnoses   Name Primary?    Stiffness of joint, shoulder region, right     Coordination impairment     Impaired function of upper extremity     Muscle hypertonicity     Right arm weakness       Pain:   Marko states the right arm is a little less tingly and his fingers seem to be working "a little better."  He feels like he is getting a little more motion and was able to play the guitar a little, strumming with his thumb.     Objective:     Mobilization of shoulder, including levator, anterior complex, lats and combined internal rotators, as well as to increase ROM toward scap retraction and protraction w/ focus on increasing ROM toward scap retraction. AROM elsa scap retraction w/ tactile facilitation and cues to increase amplitude of movement.  R UE: PROM ER@0*abd=50, ER@45*abd=60, ER@90*abd=40; SLIR=30  SLIR, 5x30s  Sit<>supine x5 trials w/ mod cues to actively use the R UE for support.  Facilitation of active ER seated EOM w/ tilt-stick. Progressed from AROM to resistance of multiple rubber-band chains of varying strength.  Facilitation of active ER via "tucking" elbow w/ hand on table, min A to SBA.  Coordination of forward reach with tilt-stick, "tucking" elbow and then pushing forward into sh flex and elbow ext. Progressed from AROM to light resistance against rubber-band chains.   R UE handwriting, name is illegible.  Playing a c-scale on the piano x4 trials. Ease of movement increased with practice, though he was not able to use the "correct" fingers for each key. Attempted chords, but pt unable to coordinate MCP ext w/ IP flex to " position fingers correctly.    Assessment:     Marko reports improved function at home but continues to require at least mod cues to incorporate the R UE during supine<>sit and transfers. At this time, he shows progress toward 3/5 STGs.  He does appear to be complying with HEP as ROM toward ER has improved in 2 of 3 planes. Due to decreased memory for HEP completion and correct techniques, Marko gonzalez's progress more slowly than would be optimal. 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      LTG GOALS:  Time frame: 12 weeks (2/26/18-5/21)  1) Pt will demonstrate ability to write The cat jumped over the lazy dog legibly w/ the R hand.  2) Pt will demonstrate ability to complete the 9HPT with the R UE in under 1:30  3) Pt will demonstrate improvement in R UE gross motor coordination, as evidenced by a B&B score of at least 40.  4) Pt will report ability to play a 4-bar ana maria on the guitar, picking the correct strings w/ the R UE.  5) Pt will demonstrate increased coordination of the R UE, as evidenced by ability to play a chord on the piano w/ the R UE.      STG Goals:  Time frame: 4 weeks (2/26/18-3/26/18)  1) Pt will report at least 75% compliance w/ HEP. (progress)  2) Pt will report ability to play a chord on the guitar, strumming the correct strings w/ the R UE.  3) Pt will report ability to play a one-octave scale on the guitar, picking the correct strings w/ the R UE.  4) Pt will demonstrate ability to play a one-octave scale on the piano w/ the R UE. (progressing)  5) Pt will demonstrate ability to legibly write name w/ R UE. (progressing)    Patient Education/Response:     Continue HEP w/ focus on shoulder stretch to ER, SLIR, tucking elbow. Handout given.    Plans and Goals:      Continue core strengthening, R UE AROM, assess for possibility of planks/quadruped.    G-Codes: carry/handle R UE (ROM, function, skilled observation)  Current: 40%-60% (CK)  Goal: 20%-40% (CJ)     Krystle Holloway, ALEXANDRE, LOTR   4/6/2018

## 2018-04-06 NOTE — PATIENT INSTRUCTIONS
"Stretch to External Rotation on Table        Place affected arm on table,  with elbow slightly bent. Gently lean forward until you feel a stretch in the shoulder. You should only feel a stretch at no pain. Hold 30 seconds. 10 reps per set, 2 sets per day, 7 days per week    Sidelying Internal Rotation - Stretch         Start by lying on your right side with the affected arm on the bottom.Your right arm should be bent at the elbow and forearm pointed upwards towards the ceiling as shown. Next, use your left arm to gently push your right forearm towards the table or bed (in the direction of your navel). You should only feel a stretch, no pain. Hold 30 seconds. 10 reps per set, 2 sets per day, 7 days per week.    External Rotation "Tucking Elbow"        Make sure to keep your hand in the same position throughout the exercise. Support it on a block next to you, in front of you on a table, or in front of you on the wall (depending on your current level of function and discussion with your therapist).     1. Start with your arm straight and your elbow crease facing inwards.   2. Turn your elbow crease forward or towards the ceiling (depending on your position).  3. Relax & repeat 20 times. Do 3 sets.        "

## 2018-04-13 ENCOUNTER — CLINICAL SUPPORT (OUTPATIENT)
Dept: REHABILITATION | Facility: HOSPITAL | Age: 68
End: 2018-04-13
Attending: INTERNAL MEDICINE
Payer: MEDICARE

## 2018-04-13 DIAGNOSIS — R29.898 RIGHT ARM WEAKNESS: ICD-10-CM

## 2018-04-13 DIAGNOSIS — R68.89 IMPAIRED FUNCTION OF UPPER EXTREMITY: ICD-10-CM

## 2018-04-13 DIAGNOSIS — R27.8 COORDINATION IMPAIRMENT: ICD-10-CM

## 2018-04-13 DIAGNOSIS — R41.89 COGNITIVE IMPAIRMENT: Primary | ICD-10-CM

## 2018-04-13 DIAGNOSIS — M25.611 STIFFNESS OF JOINT, SHOULDER REGION, RIGHT: ICD-10-CM

## 2018-04-13 DIAGNOSIS — M62.89 MUSCLE HYPERTONICITY: ICD-10-CM

## 2018-04-13 PROCEDURE — 97140 MANUAL THERAPY 1/> REGIONS: CPT | Mod: PN

## 2018-04-13 PROCEDURE — 97112 NEUROMUSCULAR REEDUCATION: CPT | Mod: PN

## 2018-04-13 NOTE — PROGRESS NOTES
Occupational Therapy    Date:  4/13/2018  Start Time: 1305  Stop Time: 1405    TIMED  Procedure Time Min.   Manual (1)  15   Neuro Re-ed (3)   45   Total Timed Minutes: 60  Total Timed Units:  4  Total Untimed Units:  0  Charges Billed/# of units:  4    Progress/Current Status    Subjective:     Patient ID: Neptali Gonzalez is a 67 y.o. male.  Diagnosis:   Encounter Diagnoses   Name Primary?    Stiffness of joint, shoulder region, right     Coordination impairment     Impaired function of upper extremity     Muscle hypertonicity     Right arm weakness     Cognitive impairment Yes      Pain:   No c/o pain. He states he has been working on HEP.    Objective:       Cues to let arm relax during gait.  Mobilization of shoulder, including levator, anterior complex, lats and combined internal rotators. Also completed mobilization to improve ROM towards scap abd and add.  AROM elsa retraction 3x10, sets spread throughout session. Also perf'd 1 set against min resistance.  SLIR, 10x30s; after stretches, PROM=34  Stretch to ER @ table top x1 rep, pt w/ WFL ROM in this position.  Facilitation of active ER standing w/ tilt-stick, 4x15 against 1/2-width yellow t-band.  facilitation of active ER via tucking elbow.  Facilitation of forward reach w/ tilt-stick, assist for tucking elbow and scap rotation, AROM 2x15, orange t-band 2x10.  Quadruped, rocking to/fro to increase use of the R UE for support; assist to keep the R elbow extended. Also worked on lifting the L UE, L LE and R LE (1 at a time) 2x5s each w/ same assist.  Facilitation of scapular rotation during overhead sh flex w/ pulley, ~20 reps.    Assessment:     Marko continues to require max cues to use the R UE for support while he is in the clinic, as well as to allow the R UE to relax and swing during gait. PROM towards ER does appear to have improved (although not measured today) and he demo's small improvements in SLIR. Due to decreased memory for HEP  completion and correct techniques, Marko gonzalez's progress more slowly than would be optimal. 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      LTG GOALS:  Time frame: 12 weeks (2/26/18-5/21)  1) Pt will demonstrate ability to write The cat jumped over the lazy dog legibly w/ the R hand.  2) Pt will demonstrate ability to complete the 9HPT with the R UE in under 1:30  3) Pt will demonstrate improvement in R UE gross motor coordination, as evidenced by a B&B score of at least 40.  4) Pt will report ability to play a 4-bar ana maria on the guitar, picking the correct strings w/ the R UE.  5) Pt will demonstrate increased coordination of the R UE, as evidenced by ability to play a chord on the piano w/ the R UE.      STG Goals:  Time frame: 4 weeks (2/26/18-3/26/18)  1) Pt will report at least 75% compliance w/ HEP.  2) Pt will report ability to play a chord on the guitar, strumming the correct strings w/ the R UE.  3) Pt will report ability to play a one-octave scale on the guitar, picking the correct strings w/ the R UE.  4) Pt will demonstrate ability to play a one-octave scale on the piano w/ the R UE. (progressing)  5) Pt will demonstrate ability to legibly write name w/ R UE. (progressing)    Patient Education/Response:     Continue HEP w/ focus on shoulder stretch to ER, SLIR, tucking elbow. He v/u.    Plans and Goals:     Facilitation of scap rotation, ER, core strengthening. Continue quadruped.    Krystle Holloway, MOT, LOTR   4/13/2018

## 2018-04-23 ENCOUNTER — CLINICAL SUPPORT (OUTPATIENT)
Dept: REHABILITATION | Facility: HOSPITAL | Age: 68
End: 2018-04-23
Attending: INTERNAL MEDICINE
Payer: MEDICARE

## 2018-04-23 DIAGNOSIS — R41.89 COGNITIVE IMPAIRMENT: Primary | ICD-10-CM

## 2018-04-23 PROCEDURE — G9168 MEMORY CURRENT STATUS: HCPCS | Mod: CJ,PN

## 2018-04-23 PROCEDURE — 92507 TX SP LANG VOICE COMM INDIV: CPT | Mod: PN

## 2018-04-23 PROCEDURE — G9169 MEMORY GOAL STATUS: HCPCS | Mod: CI,PN

## 2018-04-27 ENCOUNTER — CLINICAL SUPPORT (OUTPATIENT)
Dept: REHABILITATION | Facility: HOSPITAL | Age: 68
End: 2018-04-27
Attending: INTERNAL MEDICINE
Payer: MEDICARE

## 2018-04-27 DIAGNOSIS — M25.611 STIFFNESS OF JOINT, SHOULDER REGION, RIGHT: ICD-10-CM

## 2018-04-27 DIAGNOSIS — R27.8 LOSS OF COORDINATION: Primary | ICD-10-CM

## 2018-04-27 DIAGNOSIS — R68.89 IMPAIRED FUNCTION OF UPPER EXTREMITY: ICD-10-CM

## 2018-04-27 DIAGNOSIS — R29.898 DECREASED GRIP STRENGTH OF RIGHT HAND: ICD-10-CM

## 2018-04-27 DIAGNOSIS — R29.898 RIGHT ARM WEAKNESS: ICD-10-CM

## 2018-04-27 DIAGNOSIS — R27.8 COORDINATION IMPAIRMENT: ICD-10-CM

## 2018-04-27 DIAGNOSIS — M62.89 MUSCLE HYPERTONICITY: ICD-10-CM

## 2018-04-27 PROCEDURE — 97530 THERAPEUTIC ACTIVITIES: CPT | Mod: PN

## 2018-04-27 PROCEDURE — 97112 NEUROMUSCULAR REEDUCATION: CPT | Mod: PN

## 2018-04-27 NOTE — PROGRESS NOTES
"Occupational Therapy - Treatment and Status Update    Date:  4/27/2018  Start Time: 1408  Stop Time: 1502    TIMED  Procedure Time Min.   Manual (0)  5   TherAct (1)  9   Neuro Re-ed (3)   40   Total Timed Minutes: 54  Total Timed Units:  4  Total Untimed Units:  0  Charges Billed/# of units:  4    Progress/Current Status    Subjective:     Patient ID: Neptali Gonzalez is a 67 y.o. male.  Diagnosis:   Encounter Diagnoses   Name Primary?    Stiffness of joint, shoulder region, right     Coordination impairment     Impaired function of upper extremity     Muscle hypertonicity     Right arm weakness     Loss of coordination Yes    Decreased  strength of right hand       Pain:   "I'm having a problem because I can't access my account as I can't remember my username and password." (Re: [facility])  No c/o pain. He is unclear about HEP compliance due to preoccupation with technology difficulty.    Objective:     Encouraged pt to position R UE to the side for support while we addressed access to [facility] account. Walked pt through process of re-obtaining username and password. Pt denied having a place to store passwords. When I asked how he was going to remember his username and password this time, he simply stated, "oh, I made it an easy one."   Mobilization of shoulder, including levator, anterior complex, lats and combined internal rotators, as well as increasing ROM toward scap retraction.  AROM elsa scap retraction x10.  Facilitation of active ER standing in doorway w/ tilt-stick, progressed through rubber band chains from yellow to all 3 chains together x20 at each resistance (~120 reps total).   Facilitation of scap rotation and ER during forward reach at progressive heights to ~90* sh flex  Supine for sh circles in each direction, max tactile and verbal cues for keeping elbow extended; eccentric lowering of arm x5.  UBE x5 min forward/ 5 min back w/ R UE only to improve coordination throughout R UE, " level 1.0.     Assessment:     Due to work schedule, this is only Marko's 6th visit during this POC. His PROM to external rotation has improved during stretching (though not formally measured) and he did demo improved arm swing during gait today. However, at this point, while he has demonstrated progress, he has not met any goals.  Due to decreased memory for HEP completion and correct techniques, Marko gonzalez's progress more slowly than would be optimal. 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      LTG GOALS:  Time frame: 12 weeks (2/26/18-5/21)  1) Pt will demonstrate ability to write The cat jumped over the lazy dog legibly w/ the R hand.  2) Pt will demonstrate ability to complete the 9HPT with the R UE in under 1:30  3) Pt will demonstrate improvement in R UE gross motor coordination, as evidenced by a B&B score of at least 40.  4) Pt will report ability to play a 4-bar ana maria on the guitar, picking the correct strings w/ the R UE.  5) Pt will demonstrate increased coordination of the R UE, as evidenced by ability to play a chord on the piano w/ the R UE.      STG Goals:  Time frame: 4 weeks (2/26/18-3/26/18)  1) Pt will report at least 75% compliance w/ HEP. (progressing)  2) Pt will report ability to play a chord on the guitar, strumming the correct strings w/ the R UE. (he reports he hasn't tried playing the guitar)  3) Pt will report ability to play a one-octave scale on the guitar, picking the correct strings w/ the R UE. (he reports he hasn't tried playing the guitar)  4) Pt will demonstrate ability to play a one-octave scale on the piano w/ the R UE. (progressing)  5) Pt will demonstrate ability to legibly write name w/ R UE. (progressing)    Patient Education/Response:      Continue HEP w/ focus on shoulder stretch to ER, SLIR, tucking elbow. Added supine shoulder circles w/ handout. He v/u.    Plans and Goals:     Facilitation of scap rotation, ER, core strengthening. Return to quadruped. Continue UBE w/ R UE only.    Krystle Holloway, MOT, LOTR   4/27/2018

## 2018-05-01 DIAGNOSIS — E11.8 TYPE 2 DIABETES MELLITUS WITH COMPLICATION, WITHOUT LONG-TERM CURRENT USE OF INSULIN: ICD-10-CM

## 2018-05-01 RX ORDER — AMLODIPINE BESYLATE 10 MG/1
TABLET ORAL
Qty: 90 TABLET | Refills: 3 | Status: SHIPPED | OUTPATIENT
Start: 2018-05-01 | End: 2019-07-10 | Stop reason: SDUPTHER

## 2018-05-01 NOTE — PROGRESS NOTES
Updated Plan of Care completed. See Treatment section for additional information.     Therapist's Name: Rubi Matias MS CCC-SLP  Date: 04/23/2018

## 2018-05-10 DIAGNOSIS — E11.9 TYPE 2 DIABETES MELLITUS WITHOUT COMPLICATION: ICD-10-CM

## 2018-05-11 ENCOUNTER — CLINICAL SUPPORT (OUTPATIENT)
Dept: REHABILITATION | Facility: HOSPITAL | Age: 68
End: 2018-05-11
Attending: INTERNAL MEDICINE
Payer: MEDICARE

## 2018-05-11 DIAGNOSIS — R29.898 RIGHT ARM WEAKNESS: ICD-10-CM

## 2018-05-11 DIAGNOSIS — M25.611 STIFFNESS OF JOINT, SHOULDER REGION, RIGHT: ICD-10-CM

## 2018-05-11 DIAGNOSIS — R68.89 IMPAIRED FUNCTION OF UPPER EXTREMITY: ICD-10-CM

## 2018-05-11 DIAGNOSIS — M62.89 MUSCLE HYPERTONICITY: ICD-10-CM

## 2018-05-11 DIAGNOSIS — R27.8 COORDINATION IMPAIRMENT: ICD-10-CM

## 2018-05-11 PROCEDURE — 97140 MANUAL THERAPY 1/> REGIONS: CPT | Mod: PN

## 2018-05-11 PROCEDURE — 97112 NEUROMUSCULAR REEDUCATION: CPT | Mod: PN

## 2018-05-15 NOTE — PROGRESS NOTES
Occupational Therapy     Date:  5/11/2018  Start Time: 1010  Stop Time: 1100    TIMED  Procedure Time Min.   Manual (1)  15   Neuro Re-ed (2)   35   Total Timed Minutes: 50  Total Timed Units:  3  Total Untimed Units:  0  Charges Billed/# of units:  3    Progress/Current Status    Subjective:     Patient ID: Neptali Gonzalez is a 67 y.o. male.  Diagnosis:   Encounter Diagnoses   Name Primary?    Stiffness of joint, shoulder region, right     Coordination impairment     Impaired function of upper extremity     Muscle hypertonicity     Right arm weakness       Pain:   No c/o. Marko reports that he is improving in ST, memory is improving.    Objective:     Stretch to ER@table top - 10x30s  Mobilization of shoulder, including levator, anterior complex, lats and combined internal rotators.  Prone on forearms for cat/camel to improve scapular glide; prone on forearms for forward reaching to improve strength and coordination of scapular stabilizers.  SLIR - 10x30s  Supine for R UE chest press, circles with bivalve to control elbow to address scapular control and stabilization.    Assessment:     Continued tightness in the shoulder limits improvements in function. Reinforced to pt that he needs to be doing HEP. He v/u. Due to decreased memory for HEP completion and correct techniques, Marko gonzalez's progress more slowly than would be optimal. 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      LTG GOALS:  Time frame: 12 weeks (2/26/18-5/21)  1) Pt will demonstrate ability to write The cat jumped over the lazy dog legibly w/ the R hand.  2) Pt will demonstrate ability to complete the 9HPT with the R UE in under 1:30  3) Pt will demonstrate improvement in R UE gross motor coordination, as  evidenced by a B&B score of at least 40.  4) Pt will report ability to play a 4-bar ana maria on the guitar, picking the correct strings w/ the R UE.  5) Pt will demonstrate increased coordination of the R UE, as evidenced by ability to play a chord on the piano w/ the R UE.      STG Goals:  Time frame: 4 weeks (2/26/18-3/26/18)  1) Pt will report at least 75% compliance w/ HEP. (progressing)  2) Pt will report ability to play a chord on the guitar, strumming the correct strings w/ the R UE. (he reports he hasn't tried playing the guitar)  3) Pt will report ability to play a one-octave scale on the guitar, picking the correct strings w/ the R UE. (he reports he hasn't tried playing the guitar)  4) Pt will demonstrate ability to play a one-octave scale on the piano w/ the R UE. (progressing)  5) Pt will demonstrate ability to legibly write name w/ R UE. (progressing)    Patient Education/Response:     Continue HEP w/ focus on shoulder stretch to ER, SLIR, tucking elbow, supine shoulder circles w/ handout.  Added prone cat/camel. Handout given. He v/u.    Plans and Goals:     Facilitation of scap rotation, ER, core strengthening. Return to quadruped. Continue UBE w/ R UE only.    Krystle Holloway, MOT, LOTR   5/11/2018

## 2018-05-18 ENCOUNTER — CLINICAL SUPPORT (OUTPATIENT)
Dept: REHABILITATION | Facility: HOSPITAL | Age: 68
End: 2018-05-18
Attending: INTERNAL MEDICINE
Payer: MEDICARE

## 2018-05-18 DIAGNOSIS — R68.89 IMPAIRED FUNCTION OF UPPER EXTREMITY: ICD-10-CM

## 2018-05-18 DIAGNOSIS — R29.898 RIGHT ARM WEAKNESS: ICD-10-CM

## 2018-05-18 DIAGNOSIS — M25.611 STIFFNESS OF JOINT, SHOULDER REGION, RIGHT: ICD-10-CM

## 2018-05-18 DIAGNOSIS — M62.89 MUSCLE HYPERTONICITY: ICD-10-CM

## 2018-05-18 DIAGNOSIS — R27.8 COORDINATION IMPAIRMENT: ICD-10-CM

## 2018-05-18 PROCEDURE — 97112 NEUROMUSCULAR REEDUCATION: CPT | Mod: PN

## 2018-05-18 NOTE — PROGRESS NOTES
Occupational Therapy     Date:  5/11/2018  Start Time: 1005  Stop Time:  1100    TIMED  Procedure Time Min.   Manual (0)  5   Neuro Re-ed (2)   25   Total Timed Minutes: 55  Total Timed Units:  2  Total Untimed Units:  0  Charges Billed/# of units:  2    Pt seen 1:1 x30 minutes and in group of 2 x25 minutes  Progress/Current Status    Subjective:     Patient ID: Neptali Gonzalez is a 67 y.o. male.  Diagnosis:   Encounter Diagnoses   Name Primary?    Stiffness of joint, shoulder region, right     Coordination impairment     Impaired function of upper extremity     Muscle hypertonicity     Right arm weakness       Pain:   I've been gone all week. I was able to do 'a little bit, not a whole bunch' re: HEP.     Objective:     SLIR - 10x30s; PROM=40  Mobilization of shoulder, including levator, anterior complex, lats and combined internal rotators, as well as to increase ROM towards scap retraction.  Prone on forearms for cat/camel to improve scapular glide, as well as to increase strength/coordination of scapular stabilizers.  Prone on forearms for forward reach 2x10 each UE, alternating UE to improve strength and coordination of scapular stabilizers. Max cues for taking the time to correctly position each arm during support positioning for most benefit.  UBE x5 min forward/ 5 min back w/ R UE, level 1.0 to improve R scapulo-humeral rhythm and allow for continued AROM/AAROM throughout the arm.   Using R UE for support on table, incline & wall while reaching down to  phone, hold to ear as if conversing and then reaching down to return it to the mat. Completed 5-10 sets in each position of support - improving the function of the R UE.  Cues re: R UE positioning when seated in waiting room and during amb to relax arm, resting it on the seat, allowing for arm swing during gait. Slight improvements after cueing.    Assessment:     Slight improvements in ROM of SLIR may contribute to increased ease of active  movement and function. However, limited compliance with HEP due to decreased memory for correct techniques and for actual completion of the HEP lead to Marko progressing more slowly than would be optimal. 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      LTG GOALS:  Time frame: 12 weeks (2/26/18-5/21)  1) Pt will demonstrate ability to write The cat jumped over the lazy dog legibly w/ the R hand.  2) Pt will demonstrate ability to complete the 9HPT with the R UE in under 1:30  3) Pt will demonstrate improvement in R UE gross motor coordination, as evidenced by a B&B score of at least 40.  4) Pt will report ability to play a 4-bar ana maria on the guitar, picking the correct strings w/ the R UE.  5) Pt will demonstrate increased coordination of the R UE, as evidenced by ability to play a chord on the piano w/ the R UE.      STG Goals:  Time frame: 4 weeks (2/26/18-3/26/18)  1) Pt will report at least 75% compliance w/ HEP. (progressing)  2) Pt will report ability to play a chord on the guitar, strumming the correct strings w/ the R UE. (he reports he hasn't tried playing the guitar)  3) Pt will report ability to play a one-octave scale on the guitar, picking the correct strings w/ the R UE. (he reports he hasn't tried playing the guitar)  4) Pt will demonstrate ability to play a one-octave scale on the piano w/ the R UE. (progressing)  5) Pt will demonstrate ability to legibly write name w/ R UE. (progressing)    Patient Education/Response:     OT reviewed need to figure out how to complete HEP when traveling to increase potential for progress.  Continue HEP w/ focus on shoulder stretch to ER, SLIR, tucking elbow, supine shoulder circles w/ handout and prone cat/camel. He v/u.    Plans and  Goals:     POC update vs d/c based on progress.     ALEXANDRE Rojo, LOTR   5/18/2018

## 2018-05-18 NOTE — PATIENT INSTRUCTIONS
Stretch to External Rotation on Table        Place affected arm on table,  with elbow slightly bent. Gently lean forward until you feel a stretch in the shoulder. You should only feel a stretch at no pain. Hold 30 seconds. 10 reps per set, 2 sets per day, 7 days per week      Sidelying Internal Rotation - Stretch         Start by lying on your right side with the affected arm on the bottom.Your right arm should be bent at the elbow and forearm pointed upwards towards the ceiling as shown. Next, use your left arm to gently push your right forearm towards the table or bed (in the direction of your navel). You should only feel a stretch, no pain. Hold 30 seconds. 10 reps per set, 3 sets per day, 7 days per week.    QUADRUPED CAT & CAMEL            Lay on your belly with your forearms supporting you. Raise up your back and arch in towards the ceiling. Next return to a lowered position and squeeze your shoulder blades together.  Hold each position for 3 seconds.  Complete 3 sets of 10.    Prone Reaching      Lie on your stomach.   Prop up on your elbows/forearms so the chest is elevated.  If you have items to work with, then pile them in front of you. If not, just work on reaching for a target (like the low cross-support on a chair or fireplace hearth, etc). Alternating arms, reach 10 times with each arm. Do 3 sets.

## 2018-05-25 ENCOUNTER — CLINICAL SUPPORT (OUTPATIENT)
Dept: REHABILITATION | Facility: HOSPITAL | Age: 68
End: 2018-05-25
Attending: INTERNAL MEDICINE
Payer: MEDICARE

## 2018-05-25 DIAGNOSIS — R27.8 COORDINATION IMPAIRMENT: ICD-10-CM

## 2018-05-25 DIAGNOSIS — M25.611 STIFFNESS OF JOINT, SHOULDER REGION, RIGHT: ICD-10-CM

## 2018-05-25 DIAGNOSIS — R68.89 IMPAIRED FUNCTION OF UPPER EXTREMITY: ICD-10-CM

## 2018-05-25 DIAGNOSIS — R29.898 RIGHT ARM WEAKNESS: ICD-10-CM

## 2018-05-25 DIAGNOSIS — M62.89 MUSCLE HYPERTONICITY: ICD-10-CM

## 2018-05-25 PROCEDURE — 97112 NEUROMUSCULAR REEDUCATION: CPT | Mod: PN

## 2018-05-25 PROCEDURE — G8984 CARRY CURRENT STATUS: HCPCS | Mod: CK,PN

## 2018-05-25 PROCEDURE — G8985 CARRY GOAL STATUS: HCPCS | Mod: CJ,PN

## 2018-05-25 PROCEDURE — 97110 THERAPEUTIC EXERCISES: CPT | Mod: PN

## 2018-05-25 NOTE — PROGRESS NOTES
Occupational Therapy  Updated Plan of Care    Neptali Gonzalez  MRN: [id number]    Plan of care update completed. Please see attached POC for details. Thank you for consult!    G-Codes: carry/handle (ROM, coordination)  Current: 40%-60% (CK)  Goal: 20%-40% (CJ)    ALEXANDRE Rojo LOTR  5/25/2018         Addendum:  Pt no show for appt on 6/15/18.  OT LM to inquire and confirm next appt for 6/22 at 11am.  ALEXANDRE Rojo, LOTR 6/15/2018 1055am.
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
## 2018-05-28 NOTE — PLAN OF CARE
SPEECH THERAPY UPDATED PLAN OF TREATMENT    Patient name: Neptali Gonzalez  SOC Date:  07/31/2017  Primary Diagnosis: Cerebral Infarction (Left Hemisphere)  Treatment Diagnosis: Cognitive Impairment  Certification Period: 05/28/2018- 09/17/2018      Referring Provider:  Karen Chacon MD  [id number] [place] [address]      SUBJECTIVE:  Neptali Gonzalez is a 67 y/o male who presents with mild-moderate cognitive impairment. Deficits noted include decreased short term memory, higher-level problem solving deficits, decreased executive planning and organization, and decreased safety awareness. Patient has exhibited progress towards all goals with skilled therapy sessions and has the potential to meet all goals with continued therapy.     OBJECTIVE:  Previous Short Term Goals Status:  Progressing/Continue  Short Term Goals:  1) Patient will identify problems and generate 3 solutions to each independently. Progressing/Continue  2) Patient will organize and sequence 5 step activities with min A with 90% accuracy.  Progressing/Continue  3) Patient will demonstrate recall of functional information following a short-term delay with min A and 90% accuracy. Progressing/Continue  4) Patient will complete complex reasoning tasks to improve problem solving and safety awareness with 90% accuracy and min A. Progressing/Continue  5) Patient will complete checkbook/financial balancing tasks with 90% accuracy and min A.  Progressing/Continue     Long Term Goal Status:  Progressing/Continue  1) Patient will improve and demonstrate increased cognitive and executive function skills (planning, organizing, self-awareness, problem solving) during daily activities to maximize safety and function in current living environment.       Rehab Potential: good      ASSESSMENT:  Reasons for Recertification of Therapy:    Patient to continue to be treated for cognitive impairment 1x/week for 12 weeks to target STM  deficits, higher-level problem solving, and executive function deficits in order to maximize safety and function. Without continued therapy, patient at risk for further decline, decreased safety awareness and overall decreased quality of life.     PLAN:  Recommended Treatment Plan: 1 time per week for 16 weeks  Certification Period:  05/28/2018- 09/17/2018     Therapist's Name: Rubi Matias MS CCC-SLP  Date: 04/23/2018     I CERTIFY THE NEED FOR THESE SERVICES FURNISHED UNDER THIS PLAN OF TREATMENT AND WHILE UNDER MY CARE     Physician's comments: ________________________________________________________________________________________________________________________________________________        Physician's Name: ___________________________________

## 2018-05-29 ENCOUNTER — PATIENT OUTREACH (OUTPATIENT)
Dept: ADMINISTRATIVE | Facility: HOSPITAL | Age: 68
End: 2018-05-29
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
## 2018-06-04 ENCOUNTER — CLINICAL SUPPORT (OUTPATIENT)
Dept: REHABILITATION | Facility: HOSPITAL | Age: 68
End: 2018-06-04
Attending: INTERNAL MEDICINE
Payer: MEDICARE

## 2018-06-04 DIAGNOSIS — R47.01 APHASIA: Primary | ICD-10-CM

## 2018-06-04 PROCEDURE — G9168 MEMORY CURRENT STATUS: HCPCS | Mod: CJ,PN

## 2018-06-04 PROCEDURE — G9169 MEMORY GOAL STATUS: HCPCS | Mod: CI,PN

## 2018-06-04 PROCEDURE — 92507 TX SP LANG VOICE COMM INDIV: CPT | Mod: PN

## 2018-06-04 NOTE — PROGRESS NOTES
SLP Outpatient Progress Note    Patient name: Neptali Gonzalez  SOC Date:  07/31/2017  Primary Diagnosis: Cerebral Infarction (Left Hemisphere)  Treatment Diagnosis: Cognitive Impairment  Certification Period: 05/28/2018- 09/17/2018     SUBJECTIVE:  Patient was seen for speech therapy to address Aphasia and cognitive deficits. He arrived a few minutes early and was alert and friendly. Patient completed all tasks with min-mod verbal and visual cues, and continues to exhibit progress towards all goals at this time.     OBJECTIVE:  Goals Addressed During Today's Session:  1) Patient will identify problems and generate 3 solutions to each independently.   2) Patient will organize and sequence 5 step activities with min A with 90% accuracy.   3) Patient will demonstrate recall of functional information following a short-term delay with min A and 90% accuracy.  4) Patient will complete complex reasoning tasks to improve problem solving and safety awareness with 90% accuracy and min A.  5) Patient will complete checkbook/financial balancing tasks with 90% accuracy and min A.      ASSESSMENT:  Money management activities utilizing actual change for counting and worksheets to supplement tasks, were completed with mod verbal and visual cues. Verbal and written problem solving scenarios were used for sequencing and solution generation tasks. Progress towards goals addressed today is as follows:  1.1) Patient identified problems present in verbal and visual picture scenarios and generated 2 solutions to each with Mod A and 50% accuracy.  2) Patient organized and sequenced 3 step activities with mod A and 50% accuracy.  3) Patient demonstrated recall of 3 pieces of information given verbally, following a short-term delay, with mod A and 25% accuracy.      PLAN:  Continue current POC  Recommended Treatment Plan: 1 time per week for 16 weeks  Certification Period:  05/28/2018- 09/17/2018     Therapist's Name: Rubi Matias,  MS CCC-SLP  Date: 06/04/2018

## 2018-06-25 ENCOUNTER — LAB VISIT (OUTPATIENT)
Dept: LAB | Facility: HOSPITAL | Age: 68
End: 2018-06-25
Attending: INTERNAL MEDICINE
Payer: MEDICARE

## 2018-06-25 ENCOUNTER — OFFICE VISIT (OUTPATIENT)
Dept: PRIMARY CARE CLINIC | Facility: CLINIC | Age: 68
End: 2018-06-25
Payer: MEDICARE

## 2018-06-25 VITALS
BODY MASS INDEX: 26.26 KG/M2 | DIASTOLIC BLOOD PRESSURE: 66 MMHG | HEART RATE: 69 BPM | OXYGEN SATURATION: 98 % | SYSTOLIC BLOOD PRESSURE: 126 MMHG | HEIGHT: 66 IN | WEIGHT: 163.38 LBS

## 2018-06-25 DIAGNOSIS — E78.2 DYSLIPIDEMIA WITH LOW HIGH DENSITY LIPOPROTEIN (HDL) CHOLESTEROL WITH HYPERTRIGLYCERIDEMIA DUE TO TYPE 2 DIABETES MELLITUS: ICD-10-CM

## 2018-06-25 DIAGNOSIS — E11.8 TYPE 2 DIABETES MELLITUS WITH COMPLICATION, WITHOUT LONG-TERM CURRENT USE OF INSULIN: ICD-10-CM

## 2018-06-25 DIAGNOSIS — R20.0 NUMBNESS OF RIGHT HAND: ICD-10-CM

## 2018-06-25 DIAGNOSIS — I77.9 BILATERAL CAROTID ARTERY DISEASE: ICD-10-CM

## 2018-06-25 DIAGNOSIS — R48.2 APRAXIA: Primary | ICD-10-CM

## 2018-06-25 DIAGNOSIS — E11.69 DYSLIPIDEMIA WITH LOW HIGH DENSITY LIPOPROTEIN (HDL) CHOLESTEROL WITH HYPERTRIGLYCERIDEMIA DUE TO TYPE 2 DIABETES MELLITUS: ICD-10-CM

## 2018-06-25 DIAGNOSIS — R68.89 IMPAIRED FUNCTION OF UPPER EXTREMITY: ICD-10-CM

## 2018-06-25 DIAGNOSIS — I69.351 HEMIPLEGIA AND HEMIPARESIS FOLLOWING CEREBRAL INFARCTION AFFECTING RIGHT DOMINANT SIDE: ICD-10-CM

## 2018-06-25 LAB
25(OH)D3+25(OH)D2 SERPL-MCNC: 23 NG/ML
ALBUMIN SERPL BCP-MCNC: 4.1 G/DL
ALP SERPL-CCNC: 87 U/L
ALT SERPL W/O P-5'-P-CCNC: 21 U/L
ANION GAP SERPL CALC-SCNC: 7 MMOL/L
AST SERPL-CCNC: 15 U/L
BASOPHILS # BLD AUTO: 0.03 K/UL
BASOPHILS NFR BLD: 0.4 %
BILIRUB SERPL-MCNC: 0.6 MG/DL
BUN SERPL-MCNC: 14 MG/DL
CALCIUM SERPL-MCNC: 9.9 MG/DL
CHLORIDE SERPL-SCNC: 103 MMOL/L
CHOLEST SERPL-MCNC: 175 MG/DL
CHOLEST/HDLC SERPL: 4.3 {RATIO}
CO2 SERPL-SCNC: 28 MMOL/L
CREAT SERPL-MCNC: 1.3 MG/DL
DIFFERENTIAL METHOD: ABNORMAL
EOSINOPHIL # BLD AUTO: 0.1 K/UL
EOSINOPHIL NFR BLD: 1.3 %
ERYTHROCYTE [DISTWIDTH] IN BLOOD BY AUTOMATED COUNT: 12.8 %
EST. GFR  (AFRICAN AMERICAN): >60 ML/MIN/1.73 M^2
EST. GFR  (NON AFRICAN AMERICAN): 56 ML/MIN/1.73 M^2
ESTIMATED AVG GLUCOSE: 166 MG/DL
GLUCOSE SERPL-MCNC: 140 MG/DL
HBA1C MFR BLD HPLC: 7.4 %
HCT VFR BLD AUTO: 45.5 %
HDLC SERPL-MCNC: 41 MG/DL
HDLC SERPL: 23.4 %
HGB BLD-MCNC: 16.1 G/DL
LDLC SERPL CALC-MCNC: 95.4 MG/DL
LYMPHOCYTES # BLD AUTO: 1.2 K/UL
LYMPHOCYTES NFR BLD: 15.6 %
MCH RBC QN AUTO: 31 PG
MCHC RBC AUTO-ENTMCNC: 35.4 G/DL
MCV RBC AUTO: 88 FL
MONOCYTES # BLD AUTO: 0.4 K/UL
MONOCYTES NFR BLD: 5.5 %
NEUTROPHILS # BLD AUTO: 6.1 K/UL
NEUTROPHILS NFR BLD: 76.8 %
NONHDLC SERPL-MCNC: 134 MG/DL
PLATELET # BLD AUTO: 216 K/UL
PMV BLD AUTO: 9.7 FL
POTASSIUM SERPL-SCNC: 4.2 MMOL/L
PROT SERPL-MCNC: 7.7 G/DL
RBC # BLD AUTO: 5.19 M/UL
SODIUM SERPL-SCNC: 138 MMOL/L
TRIGL SERPL-MCNC: 193 MG/DL
WBC # BLD AUTO: 7.97 K/UL

## 2018-06-25 PROCEDURE — 83036 HEMOGLOBIN GLYCOSYLATED A1C: CPT

## 2018-06-25 PROCEDURE — 82306 VITAMIN D 25 HYDROXY: CPT

## 2018-06-25 PROCEDURE — 85025 COMPLETE CBC W/AUTO DIFF WBC: CPT

## 2018-06-25 PROCEDURE — 36415 COLL VENOUS BLD VENIPUNCTURE: CPT

## 2018-06-25 PROCEDURE — 80053 COMPREHEN METABOLIC PANEL: CPT

## 2018-06-25 PROCEDURE — 80061 LIPID PANEL: CPT

## 2018-06-25 PROCEDURE — 3078F DIAST BP <80 MM HG: CPT | Mod: CPTII,S$GLB,, | Performed by: INTERNAL MEDICINE

## 2018-06-25 PROCEDURE — 3074F SYST BP LT 130 MM HG: CPT | Mod: CPTII,S$GLB,, | Performed by: INTERNAL MEDICINE

## 2018-06-25 PROCEDURE — 3044F HG A1C LEVEL LT 7.0%: CPT | Mod: CPTII,S$GLB,, | Performed by: INTERNAL MEDICINE

## 2018-06-25 PROCEDURE — 99215 OFFICE O/P EST HI 40 MIN: CPT | Mod: S$GLB,,, | Performed by: INTERNAL MEDICINE

## 2018-06-25 RX ORDER — GLIPIZIDE 5 MG/1
5 TABLET, FILM COATED, EXTENDED RELEASE ORAL
Qty: 90 TABLET | Refills: 3 | Status: SHIPPED | OUTPATIENT
Start: 2018-06-25 | End: 2019-06-25

## 2018-06-25 RX ORDER — ATORVASTATIN CALCIUM 40 MG/1
40 TABLET, FILM COATED ORAL DAILY
Qty: 90 TABLET | Refills: 3 | Status: SHIPPED | OUTPATIENT
Start: 2018-06-25 | End: 2019-07-10 | Stop reason: SDUPTHER

## 2018-06-25 RX ORDER — METFORMIN HYDROCHLORIDE 500 MG/1
500 TABLET, EXTENDED RELEASE ORAL
Qty: 90 TABLET | Refills: 3 | Status: SHIPPED | OUTPATIENT
Start: 2018-06-25 | End: 2018-08-27 | Stop reason: SDUPTHER
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
## 2018-06-25 NOTE — ASSESSMENT & PLAN NOTE
HDL 37, but ASCVD risk high, compliant with atorvastatin 40mg  · Continue statin   · Continue metformin 500mg   · Increase to BID pending kidney function and A1c  · Patient unable to be compliant with BID dosing  · Continue glipizide 5mg
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
## 2018-06-26 ENCOUNTER — TELEPHONE (OUTPATIENT)
Dept: INTERNAL MEDICINE | Facility: CLINIC | Age: 68
End: 2018-06-26

## 2018-06-26 NOTE — TELEPHONE ENCOUNTER
Please let patient know that his A1c is higher than it was previously. It's now 7.4, which is good but higher than before. He should try to control his intake of sugary foods and drinks (like coca-cola, sweetened drinks, desserts, carbs, etc).    His vitamin D is slightly low at 23. See if he is willing to start a daily supplement of Vit D3 1000U. This will help control his diabetes, lower his risk of bone breakdown. [facility]    His cholesterol remains high, but is better controlled than last labs one year ago. Please make sure he is taking his statin.    Kidney function is slightly improved on lower dose metformin.    Thanks,  MICK

## 2018-06-27 ENCOUNTER — CLINICAL SUPPORT (OUTPATIENT)
Dept: REHABILITATION | Facility: HOSPITAL | Age: 68
End: 2018-06-27
Attending: INTERNAL MEDICINE
Payer: MEDICARE

## 2018-06-27 DIAGNOSIS — R27.8 COORDINATION IMPAIRMENT: ICD-10-CM

## 2018-06-27 DIAGNOSIS — M25.611 STIFFNESS OF JOINT, SHOULDER REGION, RIGHT: ICD-10-CM

## 2018-06-27 DIAGNOSIS — R68.89 IMPAIRED FUNCTION OF UPPER EXTREMITY: ICD-10-CM

## 2018-06-27 DIAGNOSIS — M62.89 MUSCLE HYPERTONICITY: ICD-10-CM

## 2018-06-27 DIAGNOSIS — R29.898 RIGHT ARM WEAKNESS: ICD-10-CM

## 2018-06-27 PROCEDURE — 97112 NEUROMUSCULAR REEDUCATION: CPT | Mod: PN

## 2018-06-27 PROCEDURE — 97140 MANUAL THERAPY 1/> REGIONS: CPT | Mod: PN

## 2018-06-27 NOTE — PROGRESS NOTES
"Occupational Therapy - Treatment and Status Update    Date:  5/11/2018  Start Time: 1107  Stop Time:  1201    TIMED  Procedure Time Min.   Manual (1)  15   Neuro Re-ed (3)   39   Total Timed Minutes: 54  Total Timed Units:  4  Total Untimed Units:  0  Charges Billed/# of units:  4    Progress/Current Status    Subjective:     Patient ID: Neptali Gonzalez is a 67 y.o. male.  Diagnosis:   Encounter Diagnoses   Name Primary?    Stiffness of joint, shoulder region, right     Coordination impairment     Impaired function of upper extremity     Muscle hypertonicity     Right arm weakness       Pain:   Pt states he has been working more jobs for [employer]. He has not been working on handwriting because he wanted to wait for pencil .  He states he's been working on "swinging my arm around" which he demonstrates to be supine shoulder circles.    Objective:     Marko is unable to successfully demonstrate SLIR. Re-ed re: technique and perf'd 10x30s; PROM=42*  Mobilization of shoulder, including levator, anterior complex, lats and combined internal rotators, as well as to increase ROM towards scap retraction & protraction.  Facilitation of active ER standing at doorframe w/ upper arm blocked by door-frame and body to encouraged increased ER w/ less elbow extension. He perf'd x10 each for 1/2-width yellow, yellow, orange, yellow, 1/2-width yellow t-bands.   Facilitation of forward reach w/ yellow t-band and tilt-stick  facilitaion of overhead reach with 1 and then 2 bicycle flags to progress to increased shoulder flexion, assist given for scapular rotation  Facilitation of supine overhead reach w/ rectangle, max cues to keep elbows straight and L elbow tucked to ER, assist given for scapular rotation intermittently.  Handwriting w/ pencil and cross-over pencil  using lined paper w/ dashed middle line. Mod cues for attending to and correcting  throughout writing task. Issued cross-over  and lined paper for " practice at home.    Assessment:     This is Marko's 1st visit since his POC update on 5/25. With the use of the cross-over , he demo'd the ability to legibly write his name 2 times, though legibility decreased on the 3rd and 4th trials. As such, he has met goal #5. Limited compliance with HEP due to decreased memory for correct techniques and for actual completion of the HEP leads to Marko progressing more slowly than would be optimal. 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      LTG GOALS:  Time frame: 12 weeks (2/26/18-5/21)  1) Pt will demonstrate ability to write The cat jumped over the lazy dog legibly w/ the R hand. (progressing)  2) Pt will demonstrate ability to complete the 9HPT with the R UE in under 1:30 (progressing)  3) Pt will demonstrate improvement in R UE gross motor coordination, as evidenced by a B&B score of at least 40. (no change)  4) Pt will report at least 75% compliance w/ HEP. (progressing)  5) Pt will demonstrate ability to legibly write name w/ R UE. (MET)  6) Improve ROM in ER@ 90*abd and SLIR by at least 15* each to improve function throughout the shoulder.  7) Marko will be able to reach overhead with shoulder flex at least 115* without discomfort on 10/10 trials.  8) Marko will demonstrate at least 3+/5 strength in external rotation of the R shoulder to improve indep w/ forward reach.     Patient Education/Response:     OT issued cross-over  for pencil and educated re: technique/ positioning of fingers. Continue working on stretches and handwriting at home. He v/u.     Plans and Goals:     Continue working on coordination for all tasks.    Krystle Holloway, MOT, LOTR   6/27/2018

## 2018-06-28 ENCOUNTER — CLINICAL SUPPORT (OUTPATIENT)
Dept: REHABILITATION | Facility: HOSPITAL | Age: 68
End: 2018-06-28
Attending: INTERNAL MEDICINE
Payer: MEDICARE

## 2018-06-28 DIAGNOSIS — R41.89 COGNITIVE IMPAIRMENT: ICD-10-CM

## 2018-06-28 DIAGNOSIS — R47.01 APHASIA: Primary | ICD-10-CM

## 2018-06-28 PROCEDURE — 92507 TX SP LANG VOICE COMM INDIV: CPT | Mod: PN
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
## 2018-07-19 ENCOUNTER — CLINICAL SUPPORT (OUTPATIENT)
Dept: REHABILITATION | Facility: HOSPITAL | Age: 68
End: 2018-07-19
Attending: INTERNAL MEDICINE
Payer: MEDICARE

## 2018-07-19 DIAGNOSIS — R41.89 COGNITIVE IMPAIRMENT: ICD-10-CM

## 2018-07-19 DIAGNOSIS — R47.01 APHASIA: Primary | ICD-10-CM

## 2018-07-20 ENCOUNTER — CLINICAL SUPPORT (OUTPATIENT)
Dept: REHABILITATION | Facility: HOSPITAL | Age: 68
End: 2018-07-20
Attending: INTERNAL MEDICINE
Payer: MEDICARE

## 2018-07-20 DIAGNOSIS — R29.898 RIGHT ARM WEAKNESS: ICD-10-CM

## 2018-07-20 DIAGNOSIS — M62.89 MUSCLE HYPERTONICITY: ICD-10-CM

## 2018-07-20 DIAGNOSIS — R41.89 COGNITIVE IMPAIRMENT: ICD-10-CM

## 2018-07-20 DIAGNOSIS — M25.611 STIFFNESS OF JOINT, SHOULDER REGION, RIGHT: ICD-10-CM

## 2018-07-20 DIAGNOSIS — R68.89 IMPAIRED FUNCTION OF UPPER EXTREMITY: Primary | ICD-10-CM

## 2018-07-20 DIAGNOSIS — R27.8 COORDINATION IMPAIRMENT: ICD-10-CM

## 2018-07-20 PROCEDURE — 97112 NEUROMUSCULAR REEDUCATION: CPT | Mod: PN

## 2018-07-20 PROCEDURE — G8984 CARRY CURRENT STATUS: HCPCS | Mod: CK,PN

## 2018-07-20 PROCEDURE — 97110 THERAPEUTIC EXERCISES: CPT | Mod: PN

## 2018-07-20 PROCEDURE — G8985 CARRY GOAL STATUS: HCPCS | Mod: CJ,PN

## 2018-07-23 ENCOUNTER — OFFICE VISIT (OUTPATIENT)
Dept: OPTOMETRY | Facility: CLINIC | Age: 68
End: 2018-07-23
Payer: MEDICARE

## 2018-07-23 DIAGNOSIS — E11.9 TYPE 2 DIABETES MELLITUS WITHOUT RETINOPATHY: Primary | ICD-10-CM

## 2018-07-23 DIAGNOSIS — D31.31 CHOROIDAL NEVUS, BOTH EYES: ICD-10-CM

## 2018-07-23 DIAGNOSIS — H04.123 BILATERAL DRY EYES: ICD-10-CM

## 2018-07-23 DIAGNOSIS — D31.32 CHOROIDAL NEVUS, BOTH EYES: ICD-10-CM

## 2018-07-23 DIAGNOSIS — H25.13 NUCLEAR SCLEROTIC CATARACT OF BOTH EYES: ICD-10-CM

## 2018-07-23 DIAGNOSIS — Z79.84 LONG TERM CURRENT USE OF ORAL HYPOGLYCEMIC DRUG: ICD-10-CM

## 2018-07-23 DIAGNOSIS — H34.212 HOLLENHORST PLAQUE, LEFT EYE: ICD-10-CM

## 2018-07-23 PROCEDURE — 99999 PR PBB SHADOW E&M-EST. PATIENT-LVL II: CPT | Mod: PBBFAC,,, | Performed by: OPTOMETRIST

## 2018-07-23 PROCEDURE — 92014 COMPRE OPH EXAM EST PT 1/>: CPT | Mod: S$GLB,,, | Performed by: OPTOMETRIST

## 2018-07-23 PROCEDURE — 92250 FUNDUS PHOTOGRAPHY W/I&R: CPT | Mod: S$GLB,,, | Performed by: OPTOMETRIST

## 2018-07-23 NOTE — Clinical Note
Dear Dr. Chacon,  Thank you for referring Mr. Gonzalez for a diabetic eye examination; there is no diabetic retinopathy. However, he does have a new Hollenhorst (cholesterol) plaque in his left eye. I have advised him to continue taking the 81mg aspirin daily, and I also discussed with him diet changes and regular aerobic exercise. Please contact him if you need to change his medications or order any additional testing. Please let me know if you have questions.  Sincerely, Olivia Cruz OD

## 2018-07-23 NOTE — LETTER
July 23, 2018      Karen Chacon MD  [address]  [facility] [address]           [facility]  [address]  [facility] [address]  Phone: [phone]          Patient: Neptali Gonzalez   MR Number: [id number]   YOB: 1950   Date of Visit: 7/23/2018       Dear Dr. Karen Chacon:    Thank you for referring Neptali Gonzalez to me for evaluation. Attached you will find relevant portions of my assessment and plan of care.    If you have questions, please do not hesitate to call me. I look forward to following Neptali Gonzaelz along with you.    Sincerely,    Olivia Cruz, OD    Enclosure  CC:  No Recipients    If you would like to receive this communication electronically, please contact [email or web address] or [phone] to request more information on [employer] Link access.    For providers and/or their staff who would like to refer a patient to [facility], please contact us through our one-stop-shop provider referral line, [facility], at [phone].    If you feel you have received this communication in error or would no longer like to receive these types of communications, please e-mail [email or web address]

## 2018-07-30 ENCOUNTER — OFFICE VISIT (OUTPATIENT)
Dept: NEUROLOGY | Facility: CLINIC | Age: 68
End: 2018-07-30
Payer: MEDICARE

## 2018-07-30 VITALS
SYSTOLIC BLOOD PRESSURE: 170 MMHG | HEART RATE: 66 BPM | BODY MASS INDEX: 25.71 KG/M2 | DIASTOLIC BLOOD PRESSURE: 89 MMHG | HEIGHT: 66 IN | WEIGHT: 160 LBS

## 2018-07-30 DIAGNOSIS — S46.001D INJURY OF RIGHT ROTATOR CUFF, SUBSEQUENT ENCOUNTER: ICD-10-CM

## 2018-07-30 PROCEDURE — 99214 OFFICE O/P EST MOD 30 MIN: CPT | Mod: S$GLB,,, | Performed by: PSYCHIATRY & NEUROLOGY

## 2018-07-30 PROCEDURE — 3077F SYST BP >= 140 MM HG: CPT | Mod: CPTII,S$GLB,, | Performed by: PSYCHIATRY & NEUROLOGY

## 2018-07-30 PROCEDURE — 3079F DIAST BP 80-89 MM HG: CPT | Mod: CPTII,S$GLB,, | Performed by: PSYCHIATRY & NEUROLOGY

## 2018-07-30 PROCEDURE — 99999 PR PBB SHADOW E&M-EST. PATIENT-LVL III: CPT | Mod: PBBFAC,,, | Performed by: PSYCHIATRY & NEUROLOGY

## 2018-07-30 NOTE — ASSESSMENT & PLAN NOTE
Patient has limited mobility of RUE due to rotator cuff injury. Cannot exclude component of frozen shoulder but patient denies any prolonged deficits. Patient has no evidence of right hemiplegia, only shoulder ROM and weakness of the right biceps. This suggest the shoulder injury and rotator cuff tear are the reason for most of his RUE limitations
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
## 2018-07-30 NOTE — LETTER
July 30, 2018      Karen Chacon MD  [address]  [facility] [address]           [facility]  [address]  [facility] [address]  Phone: [phone]          Patient: Neptali Gonzalez   MR Number: [id number]   YOB: 1950   Date of Visit: 7/30/2018       Dear Dr. Karen Chacon:    Thank you for referring Neptali Gonzalez to me for evaluation. Attached you will find relevant portions of my assessment and plan of care.    If you have questions, please do not hesitate to call me. I look forward to following Neptali Gonzalez along with you.    Sincerely,    Sonia Joy MD    Enclosure  CC:  No Recipients    If you would like to receive this communication electronically, please contact [email or web address] or [phone] to request more information on [employer] Link access.    For providers and/or their staff who would like to refer a patient to [facility], please contact us through our one-stop-shop provider referral line, [facility], at [phone].    If you feel you have received this communication in error or would no longer like to receive these types of communications, please e-mail [email or web address]

## 2018-07-31 ENCOUNTER — TELEPHONE (OUTPATIENT)
Dept: NEUROLOGY | Facility: CLINIC | Age: 68
End: 2018-07-31

## 2018-07-31 NOTE — TELEPHONE ENCOUNTER
Called patient and scheduled orthopedic appointment, also patient wants to ask Dr. Joy a couple of questions about visit and notes on AVS

## 2018-07-31 NOTE — TELEPHONE ENCOUNTER
----- Message from Mary Keene sent at 7/31/2018  9:33 AM CDT -----  Contact: pt @ [phone]  Calling to speak with someone in Dr. Joy's office regarding him getting set up for Orthopaedic per Dr. Joy. Says someone was to call him on yesterday. Please call.

## 2018-08-02 ENCOUNTER — CLINICAL SUPPORT (OUTPATIENT)
Dept: REHABILITATION | Facility: HOSPITAL | Age: 68
End: 2018-08-02
Payer: MEDICARE

## 2018-08-02 DIAGNOSIS — R41.89 COGNITIVE IMPAIRMENT: Primary | ICD-10-CM

## 2018-08-02 PROCEDURE — 92507 TX SP LANG VOICE COMM INDIV: CPT | Mod: PN
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
## 2018-08-03 ENCOUNTER — CLINICAL SUPPORT (OUTPATIENT)
Dept: REHABILITATION | Facility: HOSPITAL | Age: 68
End: 2018-08-03
Attending: INTERNAL MEDICINE
Payer: MEDICARE

## 2018-08-03 DIAGNOSIS — R27.8 COORDINATION IMPAIRMENT: ICD-10-CM

## 2018-08-03 DIAGNOSIS — R29.898 RIGHT ARM WEAKNESS: ICD-10-CM

## 2018-08-03 DIAGNOSIS — M25.611 STIFFNESS OF JOINT, SHOULDER REGION, RIGHT: ICD-10-CM

## 2018-08-03 DIAGNOSIS — R68.89 IMPAIRED FUNCTION OF UPPER EXTREMITY: ICD-10-CM

## 2018-08-03 DIAGNOSIS — M62.89 MUSCLE HYPERTONICITY: ICD-10-CM

## 2018-08-03 PROCEDURE — 97112 NEUROMUSCULAR REEDUCATION: CPT | Mod: PN

## 2018-08-03 PROCEDURE — 97110 THERAPEUTIC EXERCISES: CPT | Mod: PN

## 2018-08-03 NOTE — PROGRESS NOTES
"Occupational Therapy   Date:  5/11/2018  Start Time: 1110  Stop Time:  1200    TIMED  Procedure Time Min.   Manual (0)  5   TherEx (1)  10   Neuro Re-ed (2)   35   Total Timed Minutes: 50  Total Timed Units:  3  Total Untimed Units:  0  Charges Billed/# of units:  3    Progress/Current Status     Subjective:     Patient ID: Neptali Gonzalez is a 67 y.o. male.  Diagnosis:   Encounter Diagnoses   Name Primary?    Stiffness of joint, shoulder region, right     Coordination impairment     Impaired function of upper extremity     Muscle hypertonicity     Right arm weakness       Pain:   Marko asks if we can change next week's appointment because he has a 1pm appointment with an orthopedic surgeon next Friday at [facility].  He states that he does think therapy is helping.    Objective:     Facilitation of side, diagonal and forward reaches via rolling peanut bolster.  Stretching to ER @ table top, 10x30s; cues to complete stretch for 30s instead of 10s (despite consistent review at most therapy sessions)  Gentle mobilization and stretching of soft-tissues throughout the shoulder, including levator, anterior complex, pec major, lats, combined internal rotators, and subscapularis to allow for increased PROM.  Facilitation of active ER standing in doorway with tilt-stick. Consistent repositioning to ensure support to arm to prevent shoulder abduction. Progressed from 1/2-width yellow to orange t-band.  Facilitation of forward reach w/ tilt-stick in stand, cues to "tuck" elbow under to imrpove coordination of external rotation with sh flex and elbow extension. Progressed from 1/2-width yellow to orange t-band.  Facilitation of forward reach w/ bicycle flags for AAROM to about 90* sh flex (as pt not physically able to exceed 90* sh flex).  UBE x5 min forward/ 5 min back w/ elsa UE, level 1.0 to improve coordination for reciprocal movements.   coordination of active sh abd, elbow extension, supination and wrist/digit " "extension with "gary-dah!" movement in purposeful, high-amplitude, low side-reach.      Assessment:     Marko continues with impaired AROM and PROM throughout the left shoulder. When he walks, he continues to draw up the arm with elbow and digits 4 & 5 in flexion despite consistent and max education to let the arm relax and swing in a normal pattern. Poor motor planning continues to impair movements within Marko's available range and structural changes are limiting our ability to go beyond his available range. Limited compliance with HEP due to decreased memory for correct techniques and for actual completion of the HEP leads to Marko progressing more slowly than would be optimal. 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      LTG GOALS:  Time frame: 12 weeks (2/26/18-5/21)  1) Pt will demonstrate ability to write The cat jumped over the lazy dog legibly w/ the R hand. (progressing)  2) Pt will demonstrate ability to complete the 9HPT with the R UE in under 1:30 (progressing)  3) Pt will demonstrate improvement in R UE gross motor coordination, as evidenced by a B&B score of at least 40. (no change)  4) Pt will report at least 75% compliance w/ HEP. (progressing)  5) Pt will demonstrate ability to legibly write name w/ R UE. (MET)  6) Improve ROM in ER@ 90*abd and SLIR by at least 15* each to improve function throughout the shoulder.  7) Marko will be able to reach overhead with shoulder flex at least 115* without discomfort on 10/10 trials.  8) Marko will demonstrate at least 3+/5 strength in external rotation of the R shoulder to improve indep w/ forward reach.     Patient Education/Response:     Continue HEP, including stretches, handwriting. He v/u.     Plans and Goals:     Continue " working on coordination for all tasks.    ALEXANDRE Rojo, LOTR   8/3/2018

## 2018-08-07 DIAGNOSIS — E11.9 DIABETES MELLITUS WITHOUT COMPLICATION: ICD-10-CM

## 2018-08-08 ENCOUNTER — CLINICAL SUPPORT (OUTPATIENT)
Dept: REHABILITATION | Facility: HOSPITAL | Age: 68
End: 2018-08-08
Payer: MEDICARE

## 2018-08-08 DIAGNOSIS — R27.8 COORDINATION IMPAIRMENT: ICD-10-CM

## 2018-08-08 DIAGNOSIS — R29.898 RIGHT ARM WEAKNESS: ICD-10-CM

## 2018-08-08 DIAGNOSIS — R41.89 COGNITIVE IMPAIRMENT: ICD-10-CM

## 2018-08-08 DIAGNOSIS — M62.89 MUSCLE HYPERTONICITY: ICD-10-CM

## 2018-08-08 DIAGNOSIS — M25.611 STIFFNESS OF JOINT, SHOULDER REGION, RIGHT: ICD-10-CM

## 2018-08-08 DIAGNOSIS — R68.89 IMPAIRED FUNCTION OF UPPER EXTREMITY: Primary | ICD-10-CM

## 2018-08-08 PROCEDURE — 97110 THERAPEUTIC EXERCISES: CPT | Mod: PN

## 2018-08-08 PROCEDURE — 97112 NEUROMUSCULAR REEDUCATION: CPT | Mod: PN

## 2018-08-08 NOTE — PROGRESS NOTES
Occupational Therapy     Date:  8/8/2018   Start Time: 0815  Stop Time:  0910    TIMED  Procedure Time Min.   Manual (0)  5   TherEx (2)  35   Neuro Re-ed (2)   20   Total Timed Minutes: 55  Total Timed Units:  4  Total Untimed Units:  0  Charges Billed/# of units:  4    Progress/Current Status     Subjective:     Patient ID: Neptali Gonzalez is a 67 y.o. male.  Diagnosis:   Encounter Diagnoses   Name Primary?    Stiffness of joint, shoulder region, right     Coordination impairment     Impaired function of upper extremity     Muscle hypertonicity     Right arm weakness       Pain:   Marko says he appreciates being able to change his appointment. He states he has been doing his exercises at home.     Objective:     UBE x5 min forward/ 5 min back w/ elsa UE, level 1.0 to improve coordination for reciprocal movements.   Gentle mobilization and stretching of soft-tissues throughout the shoulder, including levator, anterior complex, pec major, lats, and combined internal rotators, to allow for increased PROM. Also perf'd gentle mobs and stretching to increase ROM towards scap retraction.  AROM elsa scap retraction x10.  Step-back stretch to increase ROM toward sh flex, 10x5s. Flex=103p after stretches.  Stretching to elsa ER@table top, 10x30s, cues to make sure that his 30s count was accurate.  Repeated step-back stretch, 10x5s each, flex=108p after stretches.  Facilitation of active ER standing in doorway with tilt-stick. Consistent repositioning to ensure support to arm to prevent shoulder abduction. Progressed from 1/2-width yellow->yellow->orange t-band and then back down to start to improve strength, coordiantion and endurance in external rotation.   Facilitation of forward reach w/ double bicycle flags for AAROM. Progressed from standing to elevated sitting to seated in chair to maximize potential available ROM.     Assessment:     Slight improvement in sh flex after stretches today. Nonetheless, during amb  he continues to draw up the arm with elbow and digits 4 & 5 in flexion despite consistent and max education to let the arm relax and swing in a normal pattern. Poor motor planning continues to impair movements within Marko's available range and structural changes are limiting our ability to go beyond his available range. Limited compliance with HEP due to decreased memory for correct techniques and for actual completion of the HEP leads to Marko progressing more slowly than would be optimal. 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      LTG GOALS:  Time frame: 12 weeks (2/26/18-5/21)  1) Pt will demonstrate ability to write The cat jumped over the lazy dog legibly w/ the R hand. (progressing)  2) Pt will demonstrate ability to complete the 9HPT with the R UE in under 1:30 (progressing)  3) Pt will demonstrate improvement in R UE gross motor coordination, as evidenced by a B&B score of at least 40. (no change)  4) Pt will report at least 75% compliance w/ HEP. (progressing)  5) Pt will demonstrate ability to legibly write name w/ R UE. (MET)  6) Improve ROM in ER@ 90*abd and SLIR by at least 15* each to improve function throughout the shoulder.  7) Marko will be able to reach overhead with shoulder flex at least 115* without discomfort on 10/10 trials.  8) Marko will demonstrate at least 3+/5 strength in external rotation of the R shoulder to improve indep w/ forward reach.     Patient Education/Response:     Continue HEP, including stretches, handwriting. Add step-back stretch. Handout given. He v/u.     Plans and Goals:     POC update vs d/c next visit.     Krystle Holloway, ALEXANDRE, LOTR   8/8/2018

## 2018-08-10 ENCOUNTER — OFFICE VISIT (OUTPATIENT)
Dept: ORTHOPEDICS | Facility: CLINIC | Age: 68
End: 2018-08-10
Payer: MEDICARE

## 2018-08-10 VITALS
HEIGHT: 66 IN | WEIGHT: 164.56 LBS | BODY MASS INDEX: 26.45 KG/M2 | SYSTOLIC BLOOD PRESSURE: 142 MMHG | DIASTOLIC BLOOD PRESSURE: 75 MMHG

## 2018-08-10 DIAGNOSIS — M79.601 RIGHT ARM PAIN: Primary | ICD-10-CM

## 2018-08-10 PROCEDURE — 99999 PR PBB SHADOW E&M-EST. PATIENT-LVL III: CPT | Mod: PBBFAC,,, | Performed by: PHYSICIAN ASSISTANT

## 2018-08-10 PROCEDURE — 99499 UNLISTED E&M SERVICE: CPT | Mod: S$GLB,,, | Performed by: PHYSICIAN ASSISTANT
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
## 2018-08-10 NOTE — LETTER
August 10, 2018      Sonia Joy MD  [address]  [facility] [address]           [facility]  [address]  [facility] [address]  Phone: [phone]          Patient: Neptali Gonzalez   MR Number: [id number]   YOB: 1950   Date of Visit: 8/10/2018       Dear Dr. Sonia Joy:    Thank you for referring Neptali Gonzalez to me for evaluation. Attached you will find relevant portions of my assessment and plan of care.    If you have questions, please do not hesitate to call me. I look forward to following Neptali Gonzalez along with you.    Sincerely,    Camacho Chakraborty PA-C    Enclosure  CC:  No Recipients    If you would like to receive this communication electronically, please contact [email or web address] or [phone] to request more information on [employer] Link access.    For providers and/or their staff who would like to refer a patient to [facility], please contact us through our one-stop-shop provider referral line, [facility], at [phone].    If you feel you have received this communication in error or would no longer like to receive these types of communications, please e-mail [email or web address]

## 2018-08-16 ENCOUNTER — CLINICAL SUPPORT (OUTPATIENT)
Dept: REHABILITATION | Facility: HOSPITAL | Age: 68
End: 2018-08-16
Attending: INTERNAL MEDICINE
Payer: MEDICARE

## 2018-08-16 DIAGNOSIS — R47.01 APHASIA: Primary | ICD-10-CM

## 2018-08-16 DIAGNOSIS — R41.89 COGNITIVE IMPAIRMENT: ICD-10-CM

## 2018-08-16 PROCEDURE — 92507 TX SP LANG VOICE COMM INDIV: CPT | Mod: PN

## 2018-08-16 NOTE — PROGRESS NOTES
TIME RECORD    Date:  08/16/2018    Start Time: 1300  Stop Time:  1400    PROCEDURES: Cognitive language therapy      Total Timed Minutes: 0  Total Timed Units:  0  Total Untimed Units:  1  Charges Billed/# of units: 1      Progress/Current Status    Subjective:     Patient ID: Neptali Gonzalez is a 67 y.o. male.  Diagnosis:   1. Aphasia     2. Cognitive impairment       Pt unable to ID problems he has in everyday life, but notes he feels therapy helps him and he wants to focus on the deficits seen in assessment  Objective:     Improve reasoning and problem solving skills    Assessment:     Pt performed tasks of  deduction based on word features and functions at 2/3 accuracy with mod cue.  Targeted auditory and reading comprehension of short paragraphs. H e presented noted difference between the two modalities with reading at success level and auditory requiring breakdown of comprehension to one sentence at a time and question prompts prior to the listening tasks. With paragraph in front of him he was able to locate answers more successfully    Patient Education/Response:   Discussed application of objectives to everyday life  Plans and Goals:     Continue to facilitate cognitive linguistic function

## 2018-08-17 ENCOUNTER — CLINICAL SUPPORT (OUTPATIENT)
Dept: REHABILITATION | Facility: HOSPITAL | Age: 68
End: 2018-08-17
Attending: INTERNAL MEDICINE
Payer: MEDICARE

## 2018-08-17 DIAGNOSIS — R29.898 RIGHT ARM WEAKNESS: ICD-10-CM

## 2018-08-17 DIAGNOSIS — M25.611 STIFFNESS OF JOINT, SHOULDER REGION, RIGHT: ICD-10-CM

## 2018-08-17 DIAGNOSIS — R27.8 COORDINATION IMPAIRMENT: ICD-10-CM

## 2018-08-17 DIAGNOSIS — M62.89 MUSCLE HYPERTONICITY: ICD-10-CM

## 2018-08-17 DIAGNOSIS — R68.89 IMPAIRED FUNCTION OF UPPER EXTREMITY: ICD-10-CM

## 2018-08-17 PROCEDURE — G8985 CARRY GOAL STATUS: HCPCS | Mod: CJ,PN

## 2018-08-17 PROCEDURE — G8986 CARRY D/C STATUS: HCPCS | Mod: CK,PN

## 2018-08-17 PROCEDURE — 97110 THERAPEUTIC EXERCISES: CPT | Mod: PN

## 2018-08-17 PROCEDURE — 97530 THERAPEUTIC ACTIVITIES: CPT | Mod: PN

## 2018-08-17 PROCEDURE — 97112 NEUROMUSCULAR REEDUCATION: CPT | Mod: PN

## 2018-08-17 NOTE — PROGRESS NOTES
"Occupational Therapy - Treatment and D/C Summary    Date:  8/17/2018   Start Time: 1105  Stop Time:  1200    TIMED  Procedure Time Min.   Manual (0)  5   TherEx (2)  20   TherAct (1)  15   Neuro Re-ed (1)   20   Total Timed Minutes: 55  Total Timed Units:  4  Total Untimed Units:  0  Charges Billed/# of units:  4    Progress/Current Status     Subjective:     Patient ID: Neptali Gonzalez is a 67 y.o. male.  Diagnosis:   Encounter Diagnoses   Name Primary?    Stiffness of joint, shoulder region, right     Coordination impairment     Impaired function of upper extremity     Muscle hypertonicity     Right arm weakness       Pain:   No c/o pain. Marko continues to state he has been doing his stretches at home, but hasn't been consistently working on handwriting (in fact, after completing the handwriting assessment, he exclaimed, "that's the first time I've done that" (since we last worked on it, despite having handwriting as part of his HEP).    Objective:     Writing "the cat jumped over the lazy dog" and his name with about 50% legibility.   GMC: Box & Block=23  FMC: 9HPT= 117.49s (decline from 90.39s, on 3rd attempt)    ROM:  Sh flex=105a (with elbow in flexion due to biceps tone), 115p  Sh abd=100a (moving into scaption), 95p (pure abd, supine)  PROM ER@0*abd=68, ER@45*abd=63, ER@90*abd=48; SLIR=38   Elbow ext=-5p    Presentation: R scap is slightly elevated compared to the left, slightly abducted and laterally rotated.    Completed UBE x5 min forward/ 5 min back w/ R UE, level 1.0 to improve coordination for forward reach. Cues to "keep the shoulder back," limiting compensatory trunk movement and straighten the elbow.  Stretching to R ER@table top, 5x30s, cues to make sure that his 30s count was accurate.  Step-back stretch, 10x5s each to improve PROM sh flex (completed after ER to maximize potential increases in sh flex)  Facilitation of forward reach w/ double bicycle flags for AAROM - w/ assist for " scap rotation and cues to straighten elbow throughout. Progressed from standing to elevated sitting to seated in chair to maximize potential available ROM. Repeated with single bicycle flag.    Assessment:     Overall, Marko gonzalez's no significant change in R shoulder ROM or coordination compared to his POC update in May. He has been referred to ortho for evaluation of his shoulder. Decreased memory and carry-over of his HEP limits his ability to benefit from skilled therapy. During ambulation, he continues to draw up the arm with elbow and digits 4 & 5 in flexion despite consistent and max education to let the arm relax and swing in a normal pattern. Poor motor planning continues to impair movements within Marko's available range and structural changes are limiting our ability to go beyond his available range.     LTG GOALS:  Time frame: 12 weeks (2/26/18-5/21)  1) Pt will demonstrate ability to write The cat jumped over the lazy dog legibly w/ the R hand. (NOT MET)  2) Pt will demonstrate ability to complete the 9HPT with the R UE in under 1:30 (NOT MET)  3) Pt will demonstrate improvement in R UE gross motor coordination, as evidenced by a B&B score of at least 40. (NOT MET)  4) Pt will report at least 75% compliance w/ HEP. (NOT MET)  5) Pt will demonstrate ability to legibly write name w/ R UE. (NOT MET)  6) Improve ROM in ER@ 90*abd and SLIR by at least 15* each to improve function throughout the shoulder. (NOT MET)  7) Marko will be able to reach overhead with shoulder flex at least 115* without discomfort on 10/10 trials. (NOT MET)  8) Marko will demonstrate at least 3+/5 strength in external rotation of the R shoulder to improve indep w/ forward reach. (NOT MET)    Patient Education/Response:     Continue HEP that has been given throughout therapy. Marko v/u.     Plans and Goals:     D/C skilled OT.    Initial eval: 2/26/2018  Total Visits: 14  Cancellations: 2  No show: 3    G-Codes: carry/handle (R UE ROM,  coordination)  Goal: 20%-40% (CJ)  Current: 40%-60% (CK)    ALEXANDRE Rojo, LOTR   8/17/2018

## 2018-08-27 ENCOUNTER — OFFICE VISIT (OUTPATIENT)
Dept: PRIMARY CARE CLINIC | Facility: CLINIC | Age: 68
End: 2018-08-27
Payer: MEDICARE

## 2018-08-27 ENCOUNTER — PATIENT MESSAGE (OUTPATIENT)
Dept: ADMINISTRATIVE | Facility: OTHER | Age: 68
End: 2018-08-27

## 2018-08-27 VITALS
BODY MASS INDEX: 27.14 KG/M2 | WEIGHT: 168.88 LBS | DIASTOLIC BLOOD PRESSURE: 86 MMHG | OXYGEN SATURATION: 98 % | HEIGHT: 66 IN | HEART RATE: 68 BPM | SYSTOLIC BLOOD PRESSURE: 138 MMHG

## 2018-08-27 DIAGNOSIS — E11.9 TYPE 2 DIABETES MELLITUS: ICD-10-CM

## 2018-08-27 DIAGNOSIS — Z12.11 COLON CANCER SCREENING: ICD-10-CM

## 2018-08-27 DIAGNOSIS — E11.8 TYPE 2 DIABETES MELLITUS WITH COMPLICATION, WITHOUT LONG-TERM CURRENT USE OF INSULIN: ICD-10-CM

## 2018-08-27 DIAGNOSIS — R68.89 IMPAIRED FUNCTION OF UPPER EXTREMITY: ICD-10-CM

## 2018-08-27 DIAGNOSIS — E78.2 DYSLIPIDEMIA WITH LOW HIGH DENSITY LIPOPROTEIN (HDL) CHOLESTEROL WITH HYPERTRIGLYCERIDEMIA DUE TO TYPE 2 DIABETES MELLITUS: ICD-10-CM

## 2018-08-27 DIAGNOSIS — E11.69 DYSLIPIDEMIA WITH LOW HIGH DENSITY LIPOPROTEIN (HDL) CHOLESTEROL WITH HYPERTRIGLYCERIDEMIA DUE TO TYPE 2 DIABETES MELLITUS: ICD-10-CM

## 2018-08-27 PROCEDURE — 3079F DIAST BP 80-89 MM HG: CPT | Mod: CPTII,S$GLB,, | Performed by: INTERNAL MEDICINE

## 2018-08-27 PROCEDURE — 3045F PR MOST RECENT HEMOGLOBIN A1C LEVEL 7.0-9.0%: CPT | Mod: CPTII,S$GLB,, | Performed by: INTERNAL MEDICINE

## 2018-08-27 PROCEDURE — 3077F SYST BP >= 140 MM HG: CPT | Mod: CPTII,S$GLB,, | Performed by: INTERNAL MEDICINE

## 2018-08-27 PROCEDURE — 99215 OFFICE O/P EST HI 40 MIN: CPT | Mod: S$GLB,,, | Performed by: INTERNAL MEDICINE

## 2018-08-27 RX ORDER — METFORMIN HYDROCHLORIDE 500 MG/1
1000 TABLET, EXTENDED RELEASE ORAL
Qty: 180 TABLET | Refills: 3 | Status: SHIPPED | OUTPATIENT
Start: 2018-08-27 | End: 2018-12-18 | Stop reason: SDUPTHER
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
## 2018-08-27 NOTE — ASSESSMENT & PLAN NOTE
HDL 37, but ASCVD risk high, compliant with atorvastatin 40mg  · Continue statin   · Continue metformin 500mg   · Increase to BID pending kidney function and A1c  · Patient unable to be compliant with BID dosing  · Continue glipizide 5mg  · Digital diabetes program enrollment
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
## 2018-08-27 NOTE — PATIENT INSTRUCTIONS
TODAY:  - letter for details on Dr Thurston appt (hand surgeon)  - Digital Diabetes Program   + continuous glucose monitoring  - avoid sugary foods  - continue all meds  - increase metformin to 2 tablets once daily  - podiatry appt  - pneumonia vaccine 23  - for colonoscopy you need a ride   + Please call [phone] to schedule your colonoscopy.

## 2018-08-30 ENCOUNTER — CLINICAL SUPPORT (OUTPATIENT)
Dept: REHABILITATION | Facility: HOSPITAL | Age: 68
End: 2018-08-30
Payer: MEDICARE

## 2018-08-30 DIAGNOSIS — R47.01 APHASIA: ICD-10-CM

## 2018-08-30 DIAGNOSIS — R41.89 COGNITIVE IMPAIRMENT: Primary | ICD-10-CM

## 2018-08-30 NOTE — PROGRESS NOTES
TIME RECORD    Date:  08/30/2018    Start Time: 1300  Stop Time:  1400    PROCEDURES: Cognitive language therapy      Total Timed Minutes: 0  Total Timed Units:  0  Total Untimed Units:  1  Charges Billed/# of units: 1      Progress/Current Status    Subjective:     Patient ID: Neptali Gonzalez is a 67 y.o. male.  Diagnosis:   1. Cognitive impairment     2. Aphasia       Pt compliant with all procedures.   Objective:     Improve reasoning and problem solving skills    Assessment:     Given 2 sentence paragaraph, pt could recall/respond to questions at average of 1/3 points/questions. He performed naming task of generating at least 5 per given phoneme with moderate to max cues. Given general category, pt was instructed in strategy to make sub categories to recall, and visual imagery to recall and associations for recall. He required max assist to implement these strategies and perform simple naming tasks. He often gave very tangential and off topic/category response. He would take an associated thought per on item of a category and go in complete different directions forming a new category of topic. Once cued to the problem he began to monitor the behavior and attempt to get back on track. Used objects for memory/recall task and use of category, imagery and associations to facilitate recall. In end he recalled 4/5  Patient Education/Response:   Good  Plans and Goals:     Continue to facilitate cognitive linguistic function

## 2018-09-05 ENCOUNTER — OFFICE VISIT (OUTPATIENT)
Dept: ORTHOPEDICS | Facility: CLINIC | Age: 68
End: 2018-09-05
Attending: ORTHOPAEDIC SURGERY
Payer: MEDICARE

## 2018-09-05 VITALS — HEIGHT: 66 IN | BODY MASS INDEX: 27 KG/M2 | WEIGHT: 168 LBS

## 2018-09-05 DIAGNOSIS — M75.111 INCOMPLETE TEAR OF RIGHT ROTATOR CUFF: ICD-10-CM

## 2018-09-05 PROCEDURE — 99999 PR PBB SHADOW E&M-EST. PATIENT-LVL II: CPT | Mod: PBBFAC,,, | Performed by: ORTHOPAEDIC SURGERY

## 2018-09-05 PROCEDURE — 1101F PT FALLS ASSESS-DOCD LE1/YR: CPT | Mod: CPTII,,, | Performed by: ORTHOPAEDIC SURGERY

## 2018-09-05 PROCEDURE — 99212 OFFICE O/P EST SF 10 MIN: CPT | Mod: PBBFAC,25 | Performed by: ORTHOPAEDIC SURGERY

## 2018-09-05 PROCEDURE — 20610 DRAIN/INJ JOINT/BURSA W/O US: CPT | Mod: PBBFAC,RT | Performed by: ORTHOPAEDIC SURGERY

## 2018-09-05 PROCEDURE — 99214 OFFICE O/P EST MOD 30 MIN: CPT | Mod: S$PBB,25,, | Performed by: ORTHOPAEDIC SURGERY

## 2018-09-05 PROCEDURE — 20610 DRAIN/INJ JOINT/BURSA W/O US: CPT | Mod: S$PBB,RT,, | Performed by: ORTHOPAEDIC SURGERY

## 2018-09-05 RX ORDER — TRIAMCINOLONE ACETONIDE 40 MG/ML
40 INJECTION, SUSPENSION INTRA-ARTICULAR; INTRAMUSCULAR
Status: COMPLETED | OUTPATIENT
Start: 2018-09-05 | End: 2018-09-05

## 2018-09-05 RX ADMIN — TRIAMCINOLONE ACETONIDE 40 MG: 40 INJECTION, SUSPENSION INTRA-ARTICULAR; INTRAMUSCULAR at 03:09
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
## 2018-09-05 NOTE — LETTER
September 5, 2018        Karen Chacon MD  [address]  [facility] [address]             [facility]  [address], [address]  [facility] [address]  Phone: [phone]   Patient: Neptali Gonzalez   MR Number: [id number]   YOB: 1950   Date of Visit: 9/5/2018       Dear Dr. Chacon:    Thank you for referring Neptali Gonzalez to me for evaluation. Below are the relevant portions of my assessment and plan of care.            If you have questions, please do not hesitate to call me. I look forward to following Neptali along with you.    Sincerely,      Tuan Thurston Jr., MD           CC  No Recipients

## 2018-09-06 ENCOUNTER — CLINICAL SUPPORT (OUTPATIENT)
Dept: REHABILITATION | Facility: HOSPITAL | Age: 68
End: 2018-09-06
Payer: MEDICARE

## 2018-09-06 DIAGNOSIS — R47.01 APHASIA: ICD-10-CM

## 2018-09-06 DIAGNOSIS — R41.89 COGNITIVE IMPAIRMENT: Primary | ICD-10-CM

## 2018-09-06 PROCEDURE — 92507 TX SP LANG VOICE COMM INDIV: CPT | Mod: PN

## 2018-09-06 PROCEDURE — 97127 HC THERAPEUTIC INTVTN, COGN FUNCTION - ST: CPT | Mod: PN

## 2018-09-06 NOTE — PROGRESS NOTES
"TIME RECORD    Date:  09/06/2018    Start Time: 1300  Stop Time:  1400    PROCEDURES: Cognitive language therapy      Total Timed Minutes: 0  Total Timed Units:  0  Total Untimed Units:  1  Charges Billed/# of units: 1      Progress/Current Status    Subjective:     Patient ID: Neptali Gonzalez is a 67 y.o. male.  Diagnosis:   1. Cognitive impairment     2. Aphasia       Pt came in with two smart phones. He reported that he keeps two phone because it automatically allows him to be listed in "yellow pages," Following clarification ? , he reported $10.00 a month if he had to pay for yellow pages. Pt prompted to compare how much it is costing for a second phone, as compared to actual amount for yellow page listing. Appeared his facts and reasoning is off.  Objective:     Improve reasoning and problem solving skills. Discuss objective to facilitate pt's awareness of therapy goals and his ability to relate it to his ADLs    Assessment:     Given complex written directions, pt completed at 50% accuracy given moderate to max cue level. Given percent correct on directions activity pt presented a negative acceptance. When shown map to comprehend the items of direction of east vs west, pt argued to point that he was correct; hence will show him with compass jose   Patient Education/Response:   Poor acceptance of being confronted with his weaknesses  Plans and Goals:     Continue to facilitate cognitive linguistic function and acceptance and understanding of therapy objectives    "

## 2018-09-14 ENCOUNTER — TELEPHONE (OUTPATIENT)
Dept: PODIATRY | Facility: CLINIC | Age: 68
End: 2018-09-14

## 2018-09-14 ENCOUNTER — OFFICE VISIT (OUTPATIENT)
Dept: PODIATRY | Facility: CLINIC | Age: 68
End: 2018-09-14
Payer: MEDICARE

## 2018-09-14 VITALS
DIASTOLIC BLOOD PRESSURE: 78 MMHG | HEART RATE: 84 BPM | BODY MASS INDEX: 26.26 KG/M2 | HEIGHT: 66 IN | SYSTOLIC BLOOD PRESSURE: 136 MMHG | WEIGHT: 163.38 LBS

## 2018-09-14 DIAGNOSIS — B35.1 ONYCHOMYCOSIS DUE TO DERMATOPHYTE: ICD-10-CM

## 2018-09-14 DIAGNOSIS — E11.59 TYPE 2 DIABETES MELLITUS WITH OTHER CIRCULATORY COMPLICATION, UNSPECIFIED WHETHER LONG TERM INSULIN USE: Primary | ICD-10-CM

## 2018-09-14 DIAGNOSIS — L84 CORN OR CALLUS: ICD-10-CM

## 2018-09-14 PROCEDURE — 3075F SYST BP GE 130 - 139MM HG: CPT | Mod: CPTII,,, | Performed by: PODIATRIST

## 2018-09-14 PROCEDURE — 11055 PARING/CUTG B9 HYPRKER LES 1: CPT | Mod: Q8,59,PBBFAC | Performed by: PODIATRIST

## 2018-09-14 PROCEDURE — 99999 PR PBB SHADOW E&M-EST. PATIENT-LVL III: CPT | Mod: PBBFAC,,, | Performed by: PODIATRIST

## 2018-09-14 PROCEDURE — 99213 OFFICE O/P EST LOW 20 MIN: CPT | Mod: S$PBB,25,, | Performed by: PODIATRIST

## 2018-09-14 PROCEDURE — 1101F PT FALLS ASSESS-DOCD LE1/YR: CPT | Mod: CPTII,,, | Performed by: PODIATRIST

## 2018-09-14 PROCEDURE — 3045F PR MOST RECENT HEMOGLOBIN A1C LEVEL 7.0-9.0%: CPT | Mod: CPTII,,, | Performed by: PODIATRIST

## 2018-09-14 PROCEDURE — 11721 DEBRIDE NAIL 6 OR MORE: CPT | Mod: 59,Q8,S$PBB, | Performed by: PODIATRIST

## 2018-09-14 PROCEDURE — 99213 OFFICE O/P EST LOW 20 MIN: CPT | Mod: PBBFAC,25 | Performed by: PODIATRIST

## 2018-09-14 PROCEDURE — 3078F DIAST BP <80 MM HG: CPT | Mod: CPTII,,, | Performed by: PODIATRIST

## 2018-09-14 PROCEDURE — 11721 DEBRIDE NAIL 6 OR MORE: CPT | Mod: Q8,PBBFAC,59 | Performed by: PODIATRIST

## 2018-09-14 PROCEDURE — 11055 PARING/CUTG B9 HYPRKER LES 1: CPT | Mod: Q8,S$PBB,, | Performed by: PODIATRIST

## 2018-09-14 NOTE — TELEPHONE ENCOUNTER
----- Message from Jeremie Ibanez sent at 9/14/2018  1:21 PM CDT -----  Contact: [facility]  [facility] states that patient went to the wrong building and is on his way to appointment.

## 2018-09-14 NOTE — LETTER
September 14, 2018      Karen Chacon MD  [address]  [facility] [address]           [facility]  [address]  [facility] [address]  Phone: [phone]          Patient: Neptali Gonzalez   MR Number: [id number]   YOB: 1950   Date of Visit: 9/14/2018       Dear Dr. Karen Chacon:    Thank you for referring Neptali Gonzalez to me for evaluation. Attached you will find relevant portions of my assessment and plan of care.    If you have questions, please do not hesitate to call me. I look forward to following Neptali Gonzalez along with you.    Sincerely,    Renea Ambrosio, IAIN    Enclosure  CC:  No Recipients    If you would like to receive this communication electronically, please contact [email or web address] or [phone] to request more information on [facility] access.    For providers and/or their staff who would like to refer a patient to [facility], please contact us through our one-stop-shop provider referral line, [facility], at [phone].    If you feel you have received this communication in error or would no longer like to receive these types of communications, please e-mail [email or web address]

## 2018-09-14 NOTE — PROGRESS NOTES
Subjective:      Patient ID: Neptali Gonzalez is a 67 y.o. male.    Chief Complaint: Diabetes Mellitus (PCP Dr. Chacon 1/30/18); Diabetic Foot Exam; and Routine Foot Care    Neptali is a 67 y.o. male who presents to the clinic for evaluation and treatment of high risk feet. Neptali has a past medical history of Coronary artery disease (2002), Hypertension, Kidney stones, and MI (myocardial infarction). The patient's chief complaint is long, thick toenails. This patient has documented high risk feet requiring routine maintenance secondary to peripheral vascular disease.    PCP: Karen Chacon MD    Date Last Seen by PCP: PCP Dr. Chacon 1/30/18    Current shoe gear:  Affected Foot: Tennis shoes     Unaffected Foot: Tennis shoes    Hemoglobin A1C   Date Value Ref Range Status   06/25/2018 7.4 (H) 4.0 - 5.6 % Final     Comment:     ADA Screening Guidelines:  5.7-6.4%  Consistent with prediabetes  >or=6.5%  Consistent with diabetes  High levels of fetal hemoglobin interfere with the HbA1C  assay. Heterozygous hemoglobin variants (HbS, HgC, etc)do  not significantly interfere with this assay.   However, presence of multiple variants may affect accuracy.     11/27/2017 6.1 (H) 4.0 - 5.6 % Final     Comment:     According to ADA guidelines, hemoglobin A1c <7.0% represents  optimal control in non-pregnant diabetic patients. Different  metrics may apply to specific patient populations.   Standards of Medical Care in Diabetes-2016.  For the purpose of screening for the presence of diabetes:  <5.7%     Consistent with the absence of diabetes  5.7-6.4%  Consistent with increasing risk for diabetes   (prediabetes)  >or=6.5%  Consistent with diabetes  Currently, no consensus exists for use of hemoglobin A1c  for diagnosis of diabetes for children.  This Hemoglobin A1c assay has significant interference with fetal   hemoglobin   (HbF). The results are invalid for patients with abnormal amounts of   HbF,    including those with known Hereditary Persistence   of Fetal Hemoglobin. Heterozygous hemoglobin variants (HbAS, HbAC,   HbAD, HbAE, HbA2) do not significantly interfere with this assay;   however, presence of multiple variants in a sample may impact the %   interference.     08/22/2017 7.9 (H) 4.0 - 5.6 % Final     Comment:     According to ADA guidelines, hemoglobin A1c <7.0% represents  optimal control in non-pregnant diabetic patients. Different  metrics may apply to specific patient populations.   Standards of Medical Care in Diabetes-2016.  For the purpose of screening for the presence of diabetes:  <5.7%     Consistent with the absence of diabetes  5.7-6.4%  Consistent with increasing risk for diabetes   (prediabetes)  >or=6.5%  Consistent with diabetes  Currently, no consensus exists for use of hemoglobin A1c  for diagnosis of diabetes for children.  This Hemoglobin A1c assay has significant interference with fetal   hemoglobin   (HbF). The results are invalid for patients with abnormal amounts of   HbF,   including those with known Hereditary Persistence   of Fetal Hemoglobin. Heterozygous hemoglobin variants (HbAS, HbAC,   HbAD, HbAE, HbA2) do not significantly interfere with this assay;   however, presence of multiple variants in a sample may impact the %   interference.         Review of Systems   Constitution: Negative for chills, fever and malaise/fatigue.   HENT: Negative for hearing loss.    Cardiovascular: Negative for claudication.   Respiratory: Negative for shortness of breath.    Skin: Positive for color change, dry skin and nail changes. Negative for flushing and rash.   Musculoskeletal: Negative for joint pain and myalgias.   Neurological: Negative for loss of balance, numbness, paresthesias and sensory change.   Psychiatric/Behavioral: Negative for altered mental status.           Objective:      Physical Exam   Constitutional: He appears well-developed and well-nourished. He is  cooperative.   Cardiovascular:   Pulses:       Dorsalis pedis pulses are 0 on the right side, and 0 on the left side.        Posterior tibial pulses are 1+ on the right side, and 1+ on the left side.   no LE edema noted b/L   Musculoskeletal:        Right ankle: He exhibits decreased range of motion and abnormal pulse. Achilles tendon exhibits normal Burton's test results.        Left ankle: He exhibits decreased range of motion and abnormal pulse. Achilles tendon exhibits normal Burton's test results.        Right foot: There is decreased range of motion.        Left foot: There is decreased range of motion.   Adequate joint ROM noted to all lower extremity muscle groups with no pain or crepitation noted. Muscle strength is 5/5 in all groups bilaterally.     Feet:   Right Foot:   Protective Sensation: 5 sites tested. 5 sites sensed.   Left Foot:   Protective Sensation: 5 sites tested. 5 sites sensed.   Neurological: He is alert.   intact sensation noted to b/L lower extremities   Skin: Skin is dry. Capillary refill takes more than 3 seconds.   Nails x10 are elongated by  4-5mm's, thickened by 2-3 mm's, dystrophic, and are yellowish in  coloration . Xerosis Bilaterally. No open lesions noted.   HKL noted to left heel   Psychiatric: His behavior is normal.   Vitals reviewed.            Assessment:       Encounter Diagnoses   Name Primary?    Type 2 diabetes mellitus with other circulatory complication, unspecified whether long term insulin use Yes    Corn or callus     Onychomycosis due to dermatophyte          Plan:       Neptali was seen today for diabetes mellitus, diabetic foot exam and routine foot care.    Diagnoses and all orders for this visit:    Type 2 diabetes mellitus with other circulatory complication, unspecified whether long term insulin use    Corn or callus    Onychomycosis due to dermatophyte      I counseled the patient on his conditions, their implications and medical management.        -  Shoe inspection. Diabetic Foot Education. Patient reminded of the importance of good nutrition and blood sugar control to help prevent podiatric complications of diabetes. Patient instructed on proper foot hygeine. We discussed wearing proper shoe gear, daily foot inspections, never walking without protective shoe gear, never putting sharp instruments to feet, routine podiatric nail visits every 2-3 months.    After cleansing with an alcohol prep pad, the about mentioned hyperkeratotic lesions were sharply debrided X 1 utilizing a #15 blade to a smooth base without incident. Pt tolerated the procedure well and reported comfort to the debarment sites. Pt will continue to use padding and moisture the callused areas.     - With patient's permission, nails were aggressively reduced and debrided x 10 to their soft tissue attachment mechanically and with electric , removing all offending nail and debris. Patient relates relief following the procedure. He will continue to monitor the areas daily, inspect his feet, wear protective shoe gear when ambulatory, moisturizer to maintain skin integrity and follow in this office in approximately 2-3 months, sooner p.r.n.

## 2018-09-20 DIAGNOSIS — E11.9 TYPE 2 DIABETES MELLITUS: ICD-10-CM

## 2018-10-03 ENCOUNTER — OFFICE VISIT (OUTPATIENT)
Dept: ORTHOPEDICS | Facility: CLINIC | Age: 68
End: 2018-10-03
Attending: ORTHOPAEDIC SURGERY
Payer: MEDICARE

## 2018-10-03 VITALS — WEIGHT: 163 LBS | HEIGHT: 66 IN | BODY MASS INDEX: 26.2 KG/M2

## 2018-10-03 DIAGNOSIS — M75.111 INCOMPLETE TEAR OF RIGHT ROTATOR CUFF: Primary | ICD-10-CM

## 2018-10-03 PROCEDURE — 99214 OFFICE O/P EST MOD 30 MIN: CPT | Mod: PBBFAC | Performed by: ORTHOPAEDIC SURGERY

## 2018-10-03 PROCEDURE — 99999 PR PBB SHADOW E&M-EST. PATIENT-LVL IV: CPT | Mod: PBBFAC,,, | Performed by: ORTHOPAEDIC SURGERY

## 2018-10-03 PROCEDURE — 1101F PT FALLS ASSESS-DOCD LE1/YR: CPT | Mod: CPTII,,, | Performed by: ORTHOPAEDIC SURGERY

## 2018-10-03 PROCEDURE — 99214 OFFICE O/P EST MOD 30 MIN: CPT | Mod: S$PBB,,, | Performed by: ORTHOPAEDIC SURGERY

## 2018-10-03 NOTE — PROGRESS NOTES
OFFICE VISIT AND PREOP H AND P    CHIEF COMPLAINT:  Partial tear rotator cuff, right shoulder.    HISTORY OF PRESENT ILLNESS:  A 67-year-old male with ongoing symptoms right   shoulder for the past year or so.  Previous MRI showed a partial tear of the   rotator cuff.  He has not had any improvement with physical therapy or   injections.    PAST MEDICAL HISTORY:  Significant for coronary artery disease, hypertension,   kidney stones.    PAST SURGICAL HISTORY:  Include stent placement, tonsillectomy, heart surgery.    FAMILY HISTORY:  Positive for dementia and diabetes.    SOCIAL HISTORY:  The patient is a former smoker.  No alcohol.    REVIEW OF SYSTEMS:  Negative.    CURRENT MEDICATIONS:  Reviewed.    ALLERGIES:  None.    PHYSICAL EXAMINATION:  GENERAL:  Well-developed, well-nourished male in no acute distress, alert and   oriented x3.  HEENT:  Unremarkable.  LUNGS:  Clear to auscultation.  HEART:  Regular rate and rhythm.  ABDOMEN:  Soft, nontender.  EXTREMITIES:  Significant for the right shoulder, demonstrating tenderness over   the AC joint.  Range of motion full.  Positive impingement sign.  Slight   weakness rotator cuff.    IMAGING:  MRI demonstrates partial tear rotator cuff.    IMPRESSION:  Partial tear rotator cuff, right shoulder.    PLAN:  We will set up surgery for right shoulder arthroscopy, subacromial   decompression, possible rotator cuff repair, possible AC resection.  The risks   and benefits of surgery explained to the patient, he understands.      MARKO  dd: 10/03/2018 13:59:41 (CDT)  td: 10/04/2018 07:21:32 (CDT)  Doc ID   #[id number]  Job ID #[id number]    CC:

## 2018-11-05 ENCOUNTER — IMMUNIZATION (OUTPATIENT)
Dept: INTERNAL MEDICINE | Facility: CLINIC | Age: 68
End: 2018-11-05
Payer: MEDICARE

## 2018-11-05 ENCOUNTER — OFFICE VISIT (OUTPATIENT)
Dept: PRIMARY CARE CLINIC | Facility: CLINIC | Age: 68
End: 2018-11-05
Payer: MEDICARE

## 2018-11-05 ENCOUNTER — TELEPHONE (OUTPATIENT)
Dept: INTERNAL MEDICINE | Facility: CLINIC | Age: 68
End: 2018-11-05

## 2018-11-05 ENCOUNTER — HOSPITAL ENCOUNTER (OUTPATIENT)
Dept: PREADMISSION TESTING | Facility: OTHER | Age: 68
Discharge: HOME | End: 2018-11-05
Attending: ORTHOPAEDIC SURGERY
Payer: MEDICARE

## 2018-11-05 ENCOUNTER — ANESTHESIA EVENT (OUTPATIENT)
Dept: SURGERY | Facility: OTHER | Age: 68
End: 2018-11-05
Payer: MEDICARE

## 2018-11-05 VITALS
DIASTOLIC BLOOD PRESSURE: 79 MMHG | HEART RATE: 98 BPM | OXYGEN SATURATION: 96 % | BODY MASS INDEX: 26.19 KG/M2 | SYSTOLIC BLOOD PRESSURE: 136 MMHG | TEMPERATURE: 96 F | WEIGHT: 162.94 LBS | HEIGHT: 66 IN

## 2018-11-05 VITALS
HEART RATE: 75 BPM | OXYGEN SATURATION: 98 % | BODY MASS INDEX: 26.19 KG/M2 | DIASTOLIC BLOOD PRESSURE: 90 MMHG | SYSTOLIC BLOOD PRESSURE: 140 MMHG | HEIGHT: 66 IN | WEIGHT: 162.94 LBS

## 2018-11-05 DIAGNOSIS — Z01.818 PREOP EXAM FOR INTERNAL MEDICINE: ICD-10-CM

## 2018-11-05 DIAGNOSIS — S46.001D INJURY OF RIGHT ROTATOR CUFF, SUBSEQUENT ENCOUNTER: ICD-10-CM

## 2018-11-05 DIAGNOSIS — E11.59 HYPERTENSION ASSOCIATED WITH DIABETES: ICD-10-CM

## 2018-11-05 DIAGNOSIS — I69.351 HEMIPLEGIA AND HEMIPARESIS FOLLOWING CEREBRAL INFARCTION AFFECTING RIGHT DOMINANT SIDE: ICD-10-CM

## 2018-11-05 DIAGNOSIS — I15.2 HYPERTENSION ASSOCIATED WITH DIABETES: ICD-10-CM

## 2018-11-05 PROCEDURE — 3045F PR MOST RECENT HEMOGLOBIN A1C LEVEL 7.0-9.0%: CPT | Mod: CPTII,HCNC,S$GLB, | Performed by: INTERNAL MEDICINE

## 2018-11-05 PROCEDURE — G0008 ADMIN INFLUENZA VIRUS VAC: HCPCS | Mod: HCNC,S$GLB,, | Performed by: INTERNAL MEDICINE

## 2018-11-05 PROCEDURE — 1101F PT FALLS ASSESS-DOCD LE1/YR: CPT | Mod: CPTII,HCNC,S$GLB, | Performed by: INTERNAL MEDICINE

## 2018-11-05 PROCEDURE — 3078F DIAST BP <80 MM HG: CPT | Mod: CPTII,HCNC,S$GLB, | Performed by: INTERNAL MEDICINE

## 2018-11-05 PROCEDURE — 3074F SYST BP LT 130 MM HG: CPT | Mod: CPTII,HCNC,S$GLB, | Performed by: INTERNAL MEDICINE

## 2018-11-05 PROCEDURE — 90662 IIV NO PRSV INCREASED AG IM: CPT | Mod: HCNC,S$GLB,, | Performed by: INTERNAL MEDICINE

## 2018-11-05 PROCEDURE — 99215 OFFICE O/P EST HI 40 MIN: CPT | Mod: HCNC,S$GLB,, | Performed by: INTERNAL MEDICINE

## 2018-11-05 RX ORDER — SODIUM CHLORIDE, SODIUM LACTATE, POTASSIUM CHLORIDE, CALCIUM CHLORIDE 600; 310; 30; 20 MG/100ML; MG/100ML; MG/100ML; MG/100ML
INJECTION, SOLUTION INTRAVENOUS CONTINUOUS
Status: CANCELLED | OUTPATIENT
Start: 2018-11-05

## 2018-11-05 RX ORDER — CARBIDOPA AND LEVODOPA 25; 100 MG/1; MG/1
1 TABLET, ORALLY DISINTEGRATING ORAL DAILY
COMMUNITY
End: 2018-11-05

## 2018-11-05 RX ORDER — LIDOCAINE HYDROCHLORIDE 10 MG/ML
0.5 INJECTION, SOLUTION EPIDURAL; INFILTRATION; INTRACAUDAL; PERINEURAL ONCE
Status: CANCELLED | OUTPATIENT
Start: 2018-11-05 | End: 2018-11-05

## 2018-11-05 RX ORDER — FAMOTIDINE 20 MG/1
20 TABLET, FILM COATED ORAL
Status: CANCELLED | OUTPATIENT
Start: 2018-11-05 | End: 2018-11-05

## 2018-11-05 NOTE — PATIENT INSTRUCTIONS
TODAY:  - don't take metformin and glipizide the evening before and morning of the surgery  - come back in 6 weeks for pill packing at pharmacy   + bring ALL pill bottles to that appointment  - flu vaccine  - restart meds once surgery is complete    NEXT:  - colon cancer screening

## 2018-11-05 NOTE — ANESTHESIA PREPROCEDURE EVALUATION
11/05/2018  Neptali Gonzalez is a 68 y.o., male.    Anesthesia Evaluation    I have reviewed the Patient Summary Reports.    I have reviewed the Nursing Notes.   I have reviewed the Medications.     Review of Systems  Anesthesia Hx:  No problems with previous Anesthesia    Social:  Non-Smoker    Cardiovascular:   Hypertension Past MI CAD      Pulmonary:  Pulmonary Normal    Renal/:   Chronic Renal Disease, CRI    Neurological:   CVA, residual symptoms Neuromuscular Disease,    Endocrine:   Diabetes, well controlled        Physical Exam  General:  Well nourished    Airway/Jaw/Neck:  Airway Findings: Mouth Opening: Normal Tongue: Normal  General Airway Assessment: Adult  Mallampati: II  TM Distance: Normal, at least 6 cm  Jaw/Neck Findings:     Neck ROM: Normal ROM      Dental:  Dental Findings: Edentulous             Anesthesia Plan  Type of Anesthesia, risks & benefits discussed:  Anesthesia Type:  general  Patient's Preference:   Intra-op Monitoring Plan:   Intra-op Monitoring Plan Comments:   Post Op Pain Control Plan: peripheral nerve block  Post Op Pain Control Plan Comments:   Induction:   IV  Beta Blocker:         Informed Consent: Patient understands risks and agrees with Anesthesia plan.  Questions answered. Anesthesia consent signed with patient.  ASA Score: 3     Day of Surgery Review of History & Physical:        Anesthesia Plan Notes: Pt with multiple medical probs. Sees dr Chacon(int med) at [facility].medical clearance in [facility].        Ready For Surgery From Anesthesia Perspective.

## 2018-11-05 NOTE — ASSESSMENT & PLAN NOTE
Based on Revised Cardiac Index Risk of 1 points, patient with 0.9 % risk of perioperative cardiac event based 7/2007 stroke. Physically active (4-10 functional METs), intermediate risk orthopedic surgery. Compliant with meds for optimization of cardiac risks (APT, ACE, CCB, statin). DM well-controlled  · Hold oral anti-glycemic therapies the evening before and morning of surgery  · Continue all meds following surgery  · 6 week F/U with PCP for pill packing to ensure compliance with all meds

## 2018-11-05 NOTE — PROGRESS NOTES
Primary Care Provider Appointment    Subjective:      Patient ID: Neptali Gonzalez is a 68 y.o. male with h/o stroke, rotator cuff injury, need for pre-op clearance, DM    Chief Complaint: Diabetes; Follow-up; Medication Problem; and Pre-op Exam    Patient with plans for rotator cuff repair on Thurs. Has many questions about pre-op meds. He is not compliant with his lisinopril. Only plans to take CCB the morning of his surgery.    He has not taken sinimet since 8/2017, but it imported into his med list because he brought an old bottle to his pre-op appt. Wants that taken off his medlist.    He had a stroke in 7/2007 while under emotional stress of his son's illness.     Past Surgical History:   Procedure Laterality Date    CARDIAC SURGERY      CORONARY STENT PLACEMENT      CYSTOSCOPY, WITH URETERAL STENT INSERTION N/A 2/28/2012    [facility]    illia stent 2007      INJECTION-STEROID-EPIDURAL-CERVICAL N/A 1/30/2017    [facility]    LITHOTRIPSY      LITHOTRIPSY-EXTRACORPOREAL SHOCK WAVE Right 10/29/2014    [facility] OR    RELEASE-CARPAL TUNNEL Right 8/15/2016    Per[facility] by Carlos Orlando [facility] at [facility] OR    TONSILLECTOMY         Past Medical History:   Diagnosis Date    CKD (chronic kidney disease)     Coronary artery disease 2002    stent    Diabetes mellitus     Hypertension     Kidney stones     MI (myocardial infarction)     Stroke        Review of Systems   Constitutional: Negative for activity change, appetite change, fatigue and fever.   Respiratory: Negative for shortness of breath.    Cardiovascular: Negative for chest pain and leg swelling.   Musculoskeletal: Positive for arthralgias, back pain and joint swelling.        R shoulder pain  R hand weakness   Neurological: Positive for tremors, weakness and numbness. Negative for dizziness, seizures and speech difficulty.  "  Psychiatric/Behavioral: Negative for confusion and decreased concentration. The patient is not nervous/anxious.        Objective:   BP (!) 140/90 (BP Location: Left arm, Patient Position: Sitting, BP Method: Medium (Manual))   Pulse 75   Ht 5' 6" (1.676 m)   Wt 73.9 kg (162 lb 14.7 oz)   SpO2 98%   BMI 26.30 kg/m²     Physical Exam   Constitutional: He is oriented to person, place, and time. He appears well-developed and well-nourished.   HENT:   Head: Normocephalic.   Eyes: EOM are normal.   Musculoskeletal: Normal range of motion.   Neurological: He is alert and oriented to person, place, and time.   Mild resting tremor in R hand (improved since last exam)   Skin: Skin is warm and dry.   Ecchymoses and purpura on UE bilaterally   Psychiatric: He has a normal mood and affect. His behavior is normal. Judgment and thought content normal.   Nursing note and vitals reviewed.      Lab Results   Component Value Date    WBC 7.97 06/25/2018    HGB 16.1 06/25/2018    HCT 45.5 06/25/2018     06/25/2018    CHOL 175 06/25/2018    TRIG 193 (H) 06/25/2018    HDL 41 06/25/2018    ALT 21 06/25/2018    AST 15 06/25/2018     06/25/2018    K 4.2 06/25/2018     06/25/2018    CREATININE 1.3 06/25/2018    BUN 14 06/25/2018    CO2 28 06/25/2018    INR 1.1 05/25/2016    HGBA1C 7.4 (H) 06/25/2018         Assessment:   68 y.o. male with multiple co-morbid illnesses here to continue work-up of chronic issues notably h/o stroke, rotator cuff injury, need for pre-op clearance, DM.     Plan:     Problem List Items Addressed This Visit        Neuro    Hemiplegia and hemiparesis following cerebral infarction affecting right dominant side     MRI L hemisphere infarction  · Continue teritary stroke prevention  · Completed OT and speech therapy  · F/U Neuro PRN            Cardiac/Vascular    Hypertension associated with diabetes     BP controlled on ACE, CCB  · Continue amlodipine 10mg, lisinopril 10mg  · Continue statin & " ASA            Orthopedic    Injury of right rotator cuff     MRI 3/2017 showed partial thickness bursal surface tear of the supraspinatus tendon  · F/U PMR  · Plan for surgical repair with Dr Thurston this week  · Pre-op clearance today            Other    Preop exam for internal medicine     Based on Revised Cardiac Index Risk of 1 points, patient with 0.9 % risk of perioperative cardiac event based 7/2007 stroke. Physically active (4-10 functional METs), intermediate risk orthopedic surgery. Compliant with meds for optimization of cardiac risks (APT, ACE, CCB, statin). DM well-controlled  · Hold oral anti-glycemic therapies the evening before and morning of surgery  · Continue all meds following surgery  · 6 week F/U with PCP for pill packing to ensure compliance with all meds                 Health Maintenance       Date Due Completion Date    TETANUS VACCINE 10/12/1967 ---    Colonoscopy 10/12/1999 ---    Foot Exam 06/27/2018 6/27/2017- TODAY    Influenza Vaccine 08/01/2018 10/6/2017- TODAY    Override on 10/6/2017: Done    Hemoglobin A1c 02/27/2019 8/27/2018    Lipid Panel 06/25/2019 6/25/2018    Eye Exam 07/23/2019 7/23/2018    Override on 5/23/2017: Done (Eye Cam done)    High Dose Statin 11/05/2019 11/5/2018          Follow-up in about 6 weeks (around 12/17/2018). . One hour spent with this patient today, half of that in counseling.    Karen Chacon MD/MPH  Internal Medicine  [facility]  [phone]

## 2018-11-05 NOTE — ASSESSMENT & PLAN NOTE
MRI 3/2017 showed partial thickness bursal surface tear of the supraspinatus tendon  · F/U PMR  · Plan for surgical repair with Dr Thurston this week  · Pre-op clearance today

## 2018-11-05 NOTE — ASSESSMENT & PLAN NOTE
MRI L hemisphere infarction  · Continue teritary stroke prevention  · Completed OT and speech therapy  · F/U Neuro PRN

## 2018-11-05 NOTE — TELEPHONE ENCOUNTER
Pre-op assessment as follows:    Based on Revised Cardiac Index Risk of 1 points, patient with 0.9 % risk of perioperative cardiac event due to 7/2007 stroke. Physically active (4-10 functional METs), plan for intermediate risk orthopedic surgery. Compliant with meds for optimization of cardiac risks (APT, ACE, CCB, statin). DM well-controlled  · Hold oral anti-glycemic therapies the evening before and morning of surgery  · Continue all meds following surgery  · 6 week F/U with PCP for pill packing to ensure compliance with all meds for continued cardiac optimization      Karen Chacon MD/MPH  Internal Medicine  [facility]    ----- Message from Emily Reardon MA sent at 11/5/2018 11:06 AM CST -----  Regarding: FW: needs clearance for surgery  Please, advise.   ----- Message -----  From: Tara Ramsey RN  Sent: 11/5/2018  11:00 AM  To: Tara Ramsey RN, #  Subject: needs clearance for surgery                      Patient has appt with Dr. Chacon today.  Surgery scheduled with Dr. Tuan Thurston 11/7.    Dr. Black, Anesthesia is requesting a statement of clearance/medical optimization for surgery.    Patient needs medicine reconciliation today.  He brought 4 pill bottles in for his preadmit visit.  He had a very old bottle of carbidopa - he doesn't know why he takes it and I didn't see it in any Provider notes.  Please review with him and correct the list in Epic today.    Thank you,  Tara Ramsey RN  PreAdmit  [phone]

## 2018-11-06 ENCOUNTER — TELEPHONE (OUTPATIENT)
Dept: ORTHOPEDICS | Facility: CLINIC | Age: 68
End: 2018-11-06

## 2018-11-06 NOTE — TELEPHONE ENCOUNTER
I spoke with the patient and informed him that we needed him at the hospital for 8:30 on tomorrow. He understood.

## 2018-11-07 ENCOUNTER — HOSPITAL ENCOUNTER (OUTPATIENT)
Facility: OTHER | Age: 68
Discharge: HOME | End: 2018-11-07
Attending: ORTHOPAEDIC SURGERY | Admitting: ORTHOPAEDIC SURGERY
Payer: MEDICARE

## 2018-11-07 ENCOUNTER — ANESTHESIA (OUTPATIENT)
Dept: SURGERY | Facility: OTHER | Age: 68
End: 2018-11-07
Payer: MEDICARE

## 2018-11-07 VITALS
SYSTOLIC BLOOD PRESSURE: 154 MMHG | TEMPERATURE: 97 F | RESPIRATION RATE: 16 BRPM | OXYGEN SATURATION: 99 % | DIASTOLIC BLOOD PRESSURE: 72 MMHG | HEART RATE: 84 BPM | WEIGHT: 162.94 LBS | HEIGHT: 66 IN | BODY MASS INDEX: 26.19 KG/M2

## 2018-11-07 DIAGNOSIS — M75.111 INCOMPLETE TEAR OF RIGHT ROTATOR CUFF: ICD-10-CM

## 2018-11-07 LAB
POCT GLUCOSE: 142 MG/DL (ref 70–110)
POCT GLUCOSE: 168 MG/DL (ref 70–110)

## 2018-11-07 PROCEDURE — 36000710: Mod: HCNC | Performed by: ORTHOPAEDIC SURGERY

## 2018-11-07 PROCEDURE — 63600175 PHARM REV CODE 636 W HCPCS: Mod: HCNC | Performed by: ANESTHESIOLOGY

## 2018-11-07 PROCEDURE — 37000009 HC ANESTHESIA EA ADD 15 MINS: Mod: HCNC | Performed by: ORTHOPAEDIC SURGERY

## 2018-11-07 PROCEDURE — 63600175 PHARM REV CODE 636 W HCPCS: Mod: HCNC | Performed by: ORTHOPAEDIC SURGERY

## 2018-11-07 PROCEDURE — 29826 SHO ARTHRS SRG DECOMPRESSION: CPT | Mod: HCNC,RT,, | Performed by: ORTHOPAEDIC SURGERY

## 2018-11-07 PROCEDURE — 29824 SHO ARTHRS SRG DSTL CLAVICLC: CPT | Mod: HCNC,RT,, | Performed by: ORTHOPAEDIC SURGERY

## 2018-11-07 PROCEDURE — 71000016 HC POSTOP RECOV ADDL HR: Mod: HCNC | Performed by: ORTHOPAEDIC SURGERY

## 2018-11-07 PROCEDURE — 25000003 PHARM REV CODE 250: Mod: HCNC | Performed by: NURSE ANESTHETIST, CERTIFIED REGISTERED

## 2018-11-07 PROCEDURE — 71000033 HC RECOVERY, INTIAL HOUR: Mod: HCNC | Performed by: ORTHOPAEDIC SURGERY

## 2018-11-07 PROCEDURE — 71000015 HC POSTOP RECOV 1ST HR: Mod: HCNC | Performed by: ORTHOPAEDIC SURGERY

## 2018-11-07 PROCEDURE — 25000003 PHARM REV CODE 250: Mod: HCNC | Performed by: ANESTHESIOLOGY

## 2018-11-07 PROCEDURE — 27201423 OPTIME MED/SURG SUP & DEVICES STERILE SUPPLY: Mod: HCNC | Performed by: ORTHOPAEDIC SURGERY

## 2018-11-07 PROCEDURE — 37000008 HC ANESTHESIA 1ST 15 MINUTES: Mod: HCNC | Performed by: ORTHOPAEDIC SURGERY

## 2018-11-07 PROCEDURE — 36000711: Mod: HCNC | Performed by: ORTHOPAEDIC SURGERY

## 2018-11-07 PROCEDURE — 63600175 PHARM REV CODE 636 W HCPCS: Mod: HCNC | Performed by: NURSE ANESTHETIST, CERTIFIED REGISTERED

## 2018-11-07 RX ORDER — ONDANSETRON 8 MG/1
8 TABLET, ORALLY DISINTEGRATING ORAL EVERY 8 HOURS PRN
Status: DISCONTINUED | OUTPATIENT
Start: 2018-11-07 | End: 2018-11-07 | Stop reason: HOSPADM

## 2018-11-07 RX ORDER — MEPERIDINE HYDROCHLORIDE 50 MG/ML
12.5 INJECTION INTRAMUSCULAR; INTRAVENOUS; SUBCUTANEOUS ONCE AS NEEDED
Status: DISCONTINUED | OUTPATIENT
Start: 2018-11-07 | End: 2018-11-07 | Stop reason: HOSPADM

## 2018-11-07 RX ORDER — CEFAZOLIN SODIUM 1 G/3ML
2 INJECTION, POWDER, FOR SOLUTION INTRAMUSCULAR; INTRAVENOUS
Status: DISCONTINUED | OUTPATIENT
Start: 2018-11-07 | End: 2018-11-07 | Stop reason: HOSPADM

## 2018-11-07 RX ORDER — ROCURONIUM BROMIDE 10 MG/ML
INJECTION, SOLUTION INTRAVENOUS
Status: DISCONTINUED | OUTPATIENT
Start: 2018-11-07 | End: 2018-11-07

## 2018-11-07 RX ORDER — SODIUM CHLORIDE, SODIUM LACTATE, POTASSIUM CHLORIDE, CALCIUM CHLORIDE 600; 310; 30; 20 MG/100ML; MG/100ML; MG/100ML; MG/100ML
INJECTION, SOLUTION INTRAVENOUS CONTINUOUS
Status: DISCONTINUED | OUTPATIENT
Start: 2018-11-07 | End: 2018-11-07 | Stop reason: HOSPADM

## 2018-11-07 RX ORDER — EPINEPHRINE 1 MG/ML
INJECTION, SOLUTION INTRACARDIAC; INTRAMUSCULAR; INTRAVENOUS; SUBCUTANEOUS
Status: DISCONTINUED | OUTPATIENT
Start: 2018-11-07 | End: 2018-11-07 | Stop reason: HOSPADM

## 2018-11-07 RX ORDER — GLYCOPYRROLATE 0.2 MG/ML
INJECTION INTRAMUSCULAR; INTRAVENOUS
Status: DISCONTINUED | OUTPATIENT
Start: 2018-11-07 | End: 2018-11-07

## 2018-11-07 RX ORDER — FAMOTIDINE 20 MG/1
20 TABLET, FILM COATED ORAL
Status: COMPLETED | OUTPATIENT
Start: 2018-11-07 | End: 2018-11-07

## 2018-11-07 RX ORDER — HYDROMORPHONE HYDROCHLORIDE 2 MG/ML
0.4 INJECTION, SOLUTION INTRAMUSCULAR; INTRAVENOUS; SUBCUTANEOUS EVERY 5 MIN PRN
Status: DISCONTINUED | OUTPATIENT
Start: 2018-11-07 | End: 2018-11-07 | Stop reason: HOSPADM

## 2018-11-07 RX ORDER — FENTANYL CITRATE 50 UG/ML
25 INJECTION, SOLUTION INTRAMUSCULAR; INTRAVENOUS EVERY 5 MIN PRN
Status: DISCONTINUED | OUTPATIENT
Start: 2018-11-07 | End: 2018-11-07 | Stop reason: HOSPADM

## 2018-11-07 RX ORDER — LIDOCAINE HCL/PF 100 MG/5ML
SYRINGE (ML) INTRAVENOUS
Status: DISCONTINUED | OUTPATIENT
Start: 2018-11-07 | End: 2018-11-07

## 2018-11-07 RX ORDER — OXYCODONE HYDROCHLORIDE 5 MG/1
5 TABLET ORAL
Status: DISCONTINUED | OUTPATIENT
Start: 2018-11-07 | End: 2018-11-07 | Stop reason: HOSPADM

## 2018-11-07 RX ORDER — ACETAMINOPHEN 325 MG/1
650 TABLET ORAL EVERY 4 HOURS PRN
Status: DISCONTINUED | OUTPATIENT
Start: 2018-11-07 | End: 2018-11-07 | Stop reason: HOSPADM

## 2018-11-07 RX ORDER — ONDANSETRON 2 MG/ML
4 INJECTION INTRAMUSCULAR; INTRAVENOUS DAILY PRN
Status: DISCONTINUED | OUTPATIENT
Start: 2018-11-07 | End: 2018-11-07 | Stop reason: HOSPADM

## 2018-11-07 RX ORDER — PROPOFOL 10 MG/ML
VIAL (ML) INTRAVENOUS
Status: DISCONTINUED | OUTPATIENT
Start: 2018-11-07 | End: 2018-11-07

## 2018-11-07 RX ORDER — KETOROLAC TROMETHAMINE 30 MG/ML
15 INJECTION, SOLUTION INTRAMUSCULAR; INTRAVENOUS ONCE
Status: DISCONTINUED | OUTPATIENT
Start: 2018-11-07 | End: 2018-11-07 | Stop reason: HOSPADM

## 2018-11-07 RX ORDER — OXYCODONE HYDROCHLORIDE 5 MG/1
10 TABLET ORAL EVERY 4 HOURS PRN
Status: DISCONTINUED | OUTPATIENT
Start: 2018-11-07 | End: 2018-11-07 | Stop reason: HOSPADM

## 2018-11-07 RX ORDER — OXYCODONE AND ACETAMINOPHEN 7.5; 325 MG/1; MG/1
1 TABLET ORAL EVERY 4 HOURS PRN
Qty: 40 TABLET | Refills: 0 | Status: SHIPPED | OUTPATIENT
Start: 2018-11-07 | End: 2018-12-18

## 2018-11-07 RX ORDER — ROPIVACAINE HYDROCHLORIDE 5 MG/ML
INJECTION, SOLUTION EPIDURAL; INFILTRATION; PERINEURAL
Status: COMPLETED | OUTPATIENT
Start: 2018-11-07 | End: 2018-11-07

## 2018-11-07 RX ORDER — SODIUM CHLORIDE 0.9 % (FLUSH) 0.9 %
3 SYRINGE (ML) INJECTION
Status: DISCONTINUED | OUTPATIENT
Start: 2018-11-07 | End: 2018-11-07 | Stop reason: HOSPADM

## 2018-11-07 RX ORDER — ONDANSETRON HYDROCHLORIDE 2 MG/ML
INJECTION, SOLUTION INTRAMUSCULAR; INTRAVENOUS
Status: DISCONTINUED | OUTPATIENT
Start: 2018-11-07 | End: 2018-11-07

## 2018-11-07 RX ORDER — MIDAZOLAM HYDROCHLORIDE 1 MG/ML
2 INJECTION INTRAMUSCULAR; INTRAVENOUS
Status: COMPLETED | OUTPATIENT
Start: 2018-11-07 | End: 2018-11-07

## 2018-11-07 RX ORDER — FENTANYL CITRATE 50 UG/ML
100 INJECTION, SOLUTION INTRAMUSCULAR; INTRAVENOUS EVERY 5 MIN PRN
Status: COMPLETED | OUTPATIENT
Start: 2018-11-07 | End: 2018-11-07

## 2018-11-07 RX ORDER — LIDOCAINE HYDROCHLORIDE 10 MG/ML
0.5 INJECTION, SOLUTION EPIDURAL; INFILTRATION; INTRACAUDAL; PERINEURAL ONCE
Status: DISCONTINUED | OUTPATIENT
Start: 2018-11-07 | End: 2018-11-07 | Stop reason: HOSPADM

## 2018-11-07 RX ORDER — ACETAMINOPHEN 10 MG/ML
INJECTION, SOLUTION INTRAVENOUS
Status: DISCONTINUED | OUTPATIENT
Start: 2018-11-07 | End: 2018-11-07

## 2018-11-07 RX ADMIN — CARBOXYMETHYLCELLULOSE SODIUM 2 DROP: 2.5 SOLUTION/ DROPS OPHTHALMIC at 10:11

## 2018-11-07 RX ADMIN — LIDOCAINE HYDROCHLORIDE 80 MG: 20 INJECTION, SOLUTION INTRAVENOUS at 10:11

## 2018-11-07 RX ADMIN — MIDAZOLAM HYDROCHLORIDE 2 MG: 1 INJECTION, SOLUTION INTRAMUSCULAR; INTRAVENOUS at 09:11

## 2018-11-07 RX ADMIN — ACETAMINOPHEN 1000 MG: 10 INJECTION, SOLUTION INTRAVENOUS at 10:11

## 2018-11-07 RX ADMIN — SUGAMMADEX 296 MG: 100 INJECTION, SOLUTION INTRAVENOUS at 11:11

## 2018-11-07 RX ADMIN — CEFAZOLIN 2 G: 330 INJECTION, POWDER, FOR SOLUTION INTRAMUSCULAR; INTRAVENOUS at 10:11

## 2018-11-07 RX ADMIN — GLYCOPYRROLATE 0.2 MG: 0.2 INJECTION, SOLUTION INTRAMUSCULAR; INTRAVENOUS at 11:11

## 2018-11-07 RX ADMIN — ONDANSETRON 4 MG: 2 INJECTION, SOLUTION INTRAMUSCULAR; INTRAVENOUS at 10:11

## 2018-11-07 RX ADMIN — ROCURONIUM BROMIDE 50 MG: 10 INJECTION, SOLUTION INTRAVENOUS at 10:11

## 2018-11-07 RX ADMIN — FAMOTIDINE 20 MG: 20 TABLET ORAL at 09:11

## 2018-11-07 RX ADMIN — PROPOFOL 130 MG: 10 INJECTION, EMULSION INTRAVENOUS at 10:11

## 2018-11-07 RX ADMIN — FENTANYL CITRATE 100 MCG: 50 INJECTION, SOLUTION INTRAMUSCULAR; INTRAVENOUS at 09:11

## 2018-11-07 RX ADMIN — ROPIVACAINE HYDROCHLORIDE 30 ML: 5 INJECTION, SOLUTION EPIDURAL; INFILTRATION; PERINEURAL at 09:11

## 2018-11-07 RX ADMIN — SODIUM CHLORIDE, SODIUM LACTATE, POTASSIUM CHLORIDE, AND CALCIUM CHLORIDE: 600; 310; 30; 20 INJECTION, SOLUTION INTRAVENOUS at 09:11

## 2018-11-07 NOTE — TRANSFER OF CARE
"Anesthesia Transfer of Care Note    Patient: Neptali Gonzalez    Procedure(s) Performed: Procedure(s) (LRB):  arthroscoopy with distal clavicle excision and subacromial decompression (Right)    Patient location: PACU    Anesthesia Type: general    Transport from OR: Transported from OR on 2-3 L/min O2 by NC with adequate spontaneous ventilation    Post pain: adequate analgesia    Post assessment: no apparent anesthetic complications    Post vital signs: stable    Level of consciousness: awake and alert    Nausea/Vomiting: no nausea/vomiting    Complications: none    Transfer of care protocol was followed      Last vitals:   Visit Vitals  BP (!) 152/77 (BP Location: Left arm, Patient Position: Lying)   Pulse (!) 59   Temp 36.5 °C (97.7 °F)   Resp 16   Ht 5' 6" (1.676 m)   Wt 73.9 kg (162 lb 14.7 oz)   BMI 26.30 kg/m²     "

## 2018-11-07 NOTE — DISCHARGE INSTRUCTIONS
Anesthesia: After Your Surgery  Youve just had surgery. During surgery, you received medication called anesthesia to keep you comfortable and pain-free. After surgery, you may experience some pain or nausea. This is common. Here are some tips for feeling better and recovering after surgery.    Going home  Your doctor or nurse will show you how to take care of yourself when you go home. He or she will also answer your questions. Have an adult family member or friend drive you home. For the first 24 hours after your surgery:  · Do not drive or use heavy equipment.  · Do not make important decisions or sign legal documents.  · Avoid alcohol.  · Have someone stay with you, if needed. He or she can watch for problems and help keep you safe.  Be sure to keep all follow-up appointments with your doctor. And rest after your procedure for as long as your doctor tells you to.    Coping with pain  If you have pain after surgery, pain medication will help you feel better. Take it as directed, before pain becomes severe. Also, ask your doctor or pharmacist about other ways to control pain, such as with heat, ice, and relaxation. And follow any other instructions your surgeon or nurse gives you.    Tips for taking pain medication  To get the best relief possible, remember these points:  · Pain medications can upset your stomach. Taking them with a little food may help.  · Most pain relievers taken by mouth need at least 20 to 30 minutes to take effect.  · Taking medication on a schedule can help you remember to take it. Try to time your medication so that you can take it before beginning an activity, such as dressing, walking, or sitting down for dinner.  · Constipation is a common side effect of pain medications. Contact your doctor before taking any medications like laxatives or stool softeners to help relieve constipation. Also ask about any dietary restrictions, because drinking lots of fluids and eating foods like fruits  and vegetables that are high in fiber can also help. Remember, dont take laxatives unless your surgeon has prescribed them.  · Mixing alcohol and pain medication can cause dizziness and slow your breathing. It can even be fatal. Dont drink alcohol while taking pain medication.  · Pain medication can slow your reflexes. Dont drive or operate machinery while taking pain medication.  If your health care provider tells you to take acetaminophen to help relieve your pain, ask him or her how much you are supposed to take each day. (Acetaminophen is the generic name for Tylenol and other brand-name pain relievers.) Acetaminophen or other pain relievers may interact with your prescription medicines or other over-the-counter (OTC) drugs. Some prescription medications contain acetaminophen along with other active ingredients. Using both prescription and OTC acetaminophen for pain can cause you to overdose. The FDA recommends that you read the labels on your OTC medications carefully. This will help you to clearly understand the list of active ingredients, dosing instructions, and any warnings. It may also help you avoid taking too much acetaminophen. If you have questions or don't understand the information, ask your pharmacist or health care provider to explain it to you before you take the OTC medication.    Managing nausea  Some people have an upset stomach after surgery. This is often due to anesthesia, pain, pain medications, or the stress of surgery. The following tips will help you manage nausea and get good nutrition as you recover. If you were on a special diet before surgery, ask your doctor if you should follow it during recovery. These tips may help:  · Dont push yourself to eat. Your body will tell you when to eat and how much.  · Start off with clear liquids and soup. They are easier to digest.  · Progress to semi-solid foods (mashed potatoes, applesauce, and gelatin) as you feel ready.  · Slowly move to  solid foods. Dont eat fatty, rich, or spicy foods at first.  · Dont force yourself to have three large meals a day. Instead, eat smaller amounts more often.  · Take pain medications with a small amount of solid food, such as crackers or toast to avoid nausea.      Call your surgeon if    · You feel too sleepy, dizzy, or groggy (medication may be too strong).  · You have side effects like nausea, vomiting, or skin changes (rash, itching, or hives).        Follow shoulder arthroscopy instructions for recovery given per Dr. SANDEEP Thurston   © 7875-7063 [employer] [address] [place] [address], [place] [address]. All rights reserved. This information is not intended as a substitute for professional medical care. Always follow your healthcare professional's instructions.

## 2018-11-07 NOTE — ANESTHESIA POSTPROCEDURE EVALUATION
"Anesthesia Post Evaluation    Patient: Neptali Gonzalez    Procedure(s) Performed: Procedure(s) (LRB):  arthroscoopy with distal clavicle excision and subacromial decompression (Right)    Final Anesthesia Type: general  Patient location during evaluation: PACU  Patient participation: Yes- Able to Participate  Level of consciousness: awake and alert  Post-procedure vital signs: reviewed and stable  Pain management: adequate  Airway patency: patent  PONV status at discharge: No PONV  Anesthetic complications: no      Cardiovascular status: hemodynamically stable  Respiratory status: unassisted  Hydration status: euvolemic  Follow-up not needed.        Visit Vitals  BP (!) 154/72 (BP Location: Left arm, Patient Position: Sitting)   Pulse 84   Temp 36.3 °C (97.4 °F) (Oral)   Resp 16   Ht 5' 6" (1.676 m)   Wt 73.9 kg (162 lb 14.7 oz)   SpO2 99%   BMI 26.30 kg/m²       Pain/Jaron Score: Pain Assessment Performed: Yes (11/7/2018  1:30 PM)  Presence of Pain: denies (11/7/2018  2:00 PM)  Jaron Score: 10 (11/7/2018  2:00 PM)        "

## 2018-11-07 NOTE — DISCHARGE SUMMARY
[facility]  Brief Operative Note     SUMMARY     Surgery Date: 11/7/2018     Surgeon(s) and Role:     * Tuan Thurston Jr., MD - Primary    Assisting Surgeon: None    Pre-op Diagnosis:  Incomplete tear of right rotator cuff [M75.111]    Post-op Diagnosis:  Post-Op Diagnosis Codes:     * Incomplete tear of right rotator cuff [M75.111]    Procedure(s) (LRB):  REPAIR, ROTATOR CUFF, ARTHROSCOPIC (Right)    Anesthesia: General    Description of the findings of the procedure: right shoulder impingement    Findings/Key Components: right shoulder SAD    Estimated Blood Loss: * No values recorded between 11/7/2018 10:38 AM and 11/7/2018 11:58 AM *         Specimens:   Specimen (12h ago, onward)    None          Discharge Note    SUMMARY     Admit Date: 11/7/2018    Discharge Date and Time:  11/07/2018 11:59 AM    Hospital Course (synopsis of major diagnoses, care, treatment, and services provided during the course of the hospital stay): see op note     Final Diagnosis: Post-Op Diagnosis Codes:     * Incomplete tear of right rotator cuff [M75.111]    Disposition: Home or Self Care    Follow Up/Patient Instructions:     Medications:  Reconciled Home Medications:      Medication List      START taking these medications    oxyCODONE-acetaminophen 7.5-325 mg per tablet  Commonly known as:  PERCOCET  Take 1 tablet by mouth every 4 (four) hours as needed for Pain.        CONTINUE taking these medications    acetaminophen 500 MG tablet  Commonly known as:  TYLENOL  Take 1 tablet (500 mg total) by mouth every 6 (six) hours as needed for Pain.     amLODIPine 10 MG tablet  Commonly known as:  NORVASC  TAKE 1 TABLET(10 MG) BY MOUTH EVERY DAY     aspirin 81 MG EC tablet  Commonly known as:  ECOTRIN  Take 81 mg by mouth once daily.     atorvastatin 40 MG tablet  Commonly known as:  LIPITOR  Take 1 tablet (40 mg total) by mouth once daily.     blood sugar diagnostic Strp  Commonly known as:  ACCU-CHEK RODRIGO  1 strip  by Misc.(Non-Drug; Combo Route) route once daily.     blood-glucose meter kit  Use as instructed     glipiZIDE 5 MG Tr24  Commonly known as:  GLUCOTROL  Take 1 tablet (5 mg total) by mouth daily with breakfast.     ketoconazole 2 % cream  Commonly known as:  NIZORAL  Apply topically once daily. toes     * lancets Misc  1 lancet by Misc.(Non-Drug; Combo Route) route once daily at 6am.     * TRUEPLUS LANCETS 33 gauge Misc  Generic drug:  lancets     lisinopril 10 MG tablet  Take 1 tablet (10 mg total) by mouth once daily.     metFORMIN 500 MG 24 hr tablet  Commonly known as:  GLUCOPHAGE-XR  Take 2 tablets (1,000 mg total) by mouth daily with breakfast.         * This list has 2 medication(s) that are the same as other medications prescribed for you. Read the directions carefully, and ask your doctor or other care provider to review them with you.              Discharge Procedure Orders   Diet general     Call MD for:  temperature >100.4     Call MD for:  persistent nausea and vomiting     Call MD for:  severe uncontrolled pain     Ice to affected area     Remove dressing in 24 hours     Shower on day dressing removed (No bath)     Follow-up Information     Tuan Thurston Jr, MD. Schedule an appointment as soon as possible for a visit in 1 week.    Specialties:  Hand Surgery, Orthopedic Surgery  Why:  For suture removal  Contact information:  [address]  [address]  [facility]  [facility] [id number] [phone]

## 2018-11-07 NOTE — ANESTHESIA PROCEDURE NOTES
Peripheral    Patient location during procedure: pre-op   Block not for primary anesthetic.  Reason for block: at surgeon's request and post-op pain management   Post-op Pain Location: right shoulder pain  Start time: 11/7/2018 9:32 AM  Timeout: 11/7/2018 9:31 AM   End time: 11/7/2018 9:36 AM  Staffing  Anesthesiologist: Vladimir Black MD  Performed: anesthesiologist   Preanesthetic Checklist  Completed: patient identified, site marked, surgical consent, pre-op evaluation, timeout performed, IV checked, risks and benefits discussed and monitors and equipment checked  Peripheral Block  Patient position: sitting  Prep: ChloraPrep  Patient monitoring: heart rate, cardiac monitor, continuous pulse ox, continuous capnometry and frequent blood pressure checks  Block type: interscalene  Laterality: right  Injection technique: single shot  Needle  Needle type: Stimuplex   Needle gauge: 21 G  Needle length: 3.5 in  Needle localization: anatomical landmarks and ultrasound guidance   -ultrasound image captured on disc.  Assessment  Injection assessment: negative aspiration, negative parasthesia and local visualized surrounding nerve  Paresthesia pain: none  Heart rate change: no  Slow fractionated injection: yes  Additional Notes  VSS.  [facility] RN monitoring vitals throughout procedure.  Patient tolerated procedure well.

## 2018-11-07 NOTE — INTERVAL H&P NOTE
The patient has been examined and the H&P has been reviewed:    I concur with the findings and no changes have occurred since H&P was written.    Anesthesia/Surgery risks, benefits and alternative options discussed and understood by patient/family.          Active Hospital Problems    Diagnosis  POA    Incomplete tear of right rotator cuff [M75.111]  Yes      Resolved Hospital Problems   No resolved problems to display.

## 2018-11-07 NOTE — H&P
OFFICE VISIT AND PREOP H AND P     CHIEF COMPLAINT:  Partial tear rotator cuff, right shoulder.     HISTORY OF PRESENT ILLNESS:  A 67-year-old male with ongoing symptoms right   shoulder for the past year or so.  Previous MRI showed a partial tear of the   rotator cuff.  He has not had any improvement with physical therapy or   injections.     PAST MEDICAL HISTORY:  Significant for coronary artery disease, hypertension,   kidney stones.     PAST SURGICAL HISTORY:  Include stent placement, tonsillectomy, heart surgery.     FAMILY HISTORY:  Positive for dementia and diabetes.     SOCIAL HISTORY:  The patient is a former smoker.  No alcohol.     REVIEW OF SYSTEMS:  Negative.     CURRENT MEDICATIONS:  Reviewed.     ALLERGIES:  None.     PHYSICAL EXAMINATION:  GENERAL:  Well-developed, well-nourished male in no acute distress, alert and   oriented x3.  HEENT:  Unremarkable.  LUNGS:  Clear to auscultation.  HEART:  Regular rate and rhythm.  ABDOMEN:  Soft, nontender.  EXTREMITIES:  Significant for the right shoulder, demonstrating tenderness over   the AC joint.  Range of motion full.  Positive impingement sign.  Slight   weakness rotator cuff.     IMAGING:  MRI demonstrates partial tear rotator cuff.     IMPRESSION:  Partial tear rotator cuff, right shoulder.     PLAN:  We will set up surgery for right shoulder arthroscopy, subacromial   decompression, possible rotator cuff repair, possible AC resection.  The risks   and benefits of surgery explained to the patient, he understands.

## 2018-11-14 ENCOUNTER — OFFICE VISIT (OUTPATIENT)
Dept: ORTHOPEDICS | Facility: CLINIC | Age: 68
End: 2018-11-14
Attending: ORTHOPAEDIC SURGERY
Payer: MEDICARE

## 2018-11-14 VITALS — WEIGHT: 162.94 LBS | HEIGHT: 66 IN | BODY MASS INDEX: 26.19 KG/M2

## 2018-11-14 DIAGNOSIS — M75.111 INCOMPLETE TEAR OF RIGHT ROTATOR CUFF: Primary | ICD-10-CM

## 2018-11-14 PROCEDURE — 99999 PR PBB SHADOW E&M-EST. PATIENT-LVL III: CPT | Mod: PBBFAC,HCNC,, | Performed by: ORTHOPAEDIC SURGERY

## 2018-11-14 PROCEDURE — 99024 POSTOP FOLLOW-UP VISIT: CPT | Mod: HCNC,S$GLB,, | Performed by: ORTHOPAEDIC SURGERY

## 2018-11-14 RX ORDER — HYDROCODONE BITARTRATE AND ACETAMINOPHEN 5; 325 MG/1; MG/1
1 TABLET ORAL EVERY 8 HOURS PRN
Qty: 30 TABLET | Refills: 0 | Status: SHIPPED | OUTPATIENT
Start: 2018-11-14 | End: 2018-11-24

## 2018-11-14 NOTE — PROGRESS NOTES
HISTORY OF PRESENT ILLNESS:  Mr. Gonzalez in followup of right shoulder   arthroscopy and debridement partial tear.  He is one week's postop.  He is doing   well.    PHYSICAL EXAMINATION:  RIGHT SHOULDER:  The incisions are healing well.  No evidence of infection.    Range of motion shoulder limited secondary to pain.  NEUROLOGIC:  Intact.    PLAN:  Sutures removed.  Instructed in appropriate wound care, start some gentle   range of motion exercises.  I have also ordered some therapy in [place].  No   heavy lifting.  Follow up in 3-4 weeks.      MARKO  dd: 11/14/2018 14:27:55 (CST)  td: 11/15/2018 05:29:55 (CST)  Doc ID   #[id number]  Job ID #[id number]    CC:

## 2018-11-30 ENCOUNTER — CLINICAL SUPPORT (OUTPATIENT)
Dept: REHABILITATION | Facility: HOSPITAL | Age: 68
End: 2018-11-30
Attending: ORTHOPAEDIC SURGERY
Payer: MEDICARE

## 2018-11-30 DIAGNOSIS — M25.519 SHOULDER PAIN, UNSPECIFIED CHRONICITY, UNSPECIFIED LATERALITY: Primary | ICD-10-CM

## 2018-11-30 DIAGNOSIS — M25.60 LIMITED JOINT RANGE OF MOTION (ROM): ICD-10-CM

## 2018-11-30 PROBLEM — R27.8 COORDINATION IMPAIRMENT: Status: RESOLVED | Noted: 2018-02-26 | Resolved: 2018-11-30

## 2018-11-30 PROBLEM — M62.89 MUSCLE HYPERTONICITY: Status: RESOLVED | Noted: 2018-02-26 | Resolved: 2018-11-30

## 2018-11-30 PROBLEM — R29.898 RIGHT ARM WEAKNESS: Status: RESOLVED | Noted: 2018-02-26 | Resolved: 2018-11-30

## 2018-11-30 PROBLEM — R68.89 IMPAIRED FUNCTION OF UPPER EXTREMITY: Status: RESOLVED | Noted: 2018-02-26 | Resolved: 2018-11-30

## 2018-11-30 PROBLEM — M25.611 STIFFNESS OF JOINT, SHOULDER REGION, RIGHT: Status: RESOLVED | Noted: 2018-02-26 | Resolved: 2018-11-30

## 2018-11-30 PROBLEM — M54.12 CERVICAL RADICULAR PAIN: Status: RESOLVED | Noted: 2017-02-02 | Resolved: 2018-11-30

## 2018-11-30 PROCEDURE — 97161 PT EVAL LOW COMPLEX 20 MIN: CPT | Mod: HCNC,PN

## 2018-11-30 PROCEDURE — G8988 SELF CARE GOAL STATUS: HCPCS | Mod: CI,HCNC,PN

## 2018-11-30 PROCEDURE — G8987 SELF CARE CURRENT STATUS: HCPCS | Mod: CI,HCNC,PN

## 2018-11-30 PROCEDURE — 97110 THERAPEUTIC EXERCISES: CPT | Mod: HCNC,PN

## 2018-11-30 NOTE — PATIENT INSTRUCTIONS
ROM: Flexion        Keeping RIGHT arm on table, slide arm forward until stretch is felt. Hold __15__ seconds.  Repeat __5__ times per set. Do __1__ sets per session. Do _1-2___ sessions per day.    DO NOT USE CHAIR WITH WHEELS     [email or web address]     Copyright © [employer]. All rights reserved.   ROM: Abduction        With RIGHT arm resting on table, palm up, bring head down toward arm slide arm until stretch is felt. Hold __15__ seconds.   Repeat __5__ times per set. Do __1__ sets per session. Do __1-2__ sessions per day.    DO NOT USE CHAIR WITH WHEELS      [email or web address]     Copyright © [employer]. All rights reserved.   Scapular Retraction (Standing)        With arms at sides, pinch shoulder blades together.  Repeat __10__ times per set. Do __3__ sets per session. Do __1-2__ sessions per day.     [email or web address]     Copyright © [employer]. All rights reserved.
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
## 2018-12-03 ENCOUNTER — CLINICAL SUPPORT (OUTPATIENT)
Dept: REHABILITATION | Facility: HOSPITAL | Age: 68
End: 2018-12-03
Attending: ORTHOPAEDIC SURGERY
Payer: MEDICARE

## 2018-12-03 DIAGNOSIS — R29.898 RIGHT ARM WEAKNESS: ICD-10-CM

## 2018-12-03 DIAGNOSIS — M25.60 LIMITED JOINT RANGE OF MOTION (ROM): ICD-10-CM

## 2018-12-03 DIAGNOSIS — M25.519 SHOULDER PAIN, UNSPECIFIED CHRONICITY, UNSPECIFIED LATERALITY: Primary | ICD-10-CM

## 2018-12-03 PROBLEM — M25.619 DECREASED RANGE OF MOTION (ROM) OF SHOULDER: Status: ACTIVE | Noted: 2018-12-03

## 2018-12-03 PROCEDURE — 97110 THERAPEUTIC EXERCISES: CPT | Mod: HCNC,PN

## 2018-12-03 PROCEDURE — 97140 MANUAL THERAPY 1/> REGIONS: CPT | Mod: HCNC,PN

## 2018-12-03 NOTE — PROGRESS NOTES
"Name: Neptali Burleson [facility] Number: [id number]  Date of Treatment: 12/03/2018   Diagnosis:   Encounter Diagnoses   Name Primary?    Shoulder pain, unspecified chronicity, unspecified laterality Yes    Right arm weakness     Limited joint range of motion (ROM)        Time in: 1105  Time Out: 1200  Total Treatment Time: 55  Group Time: NA      Subjective:    Neptali reports no significant change in status at this time.  Patient reports their pain to be 3/10 on a 0-10 scale with 0 being no pain and 10 being the worst pain imaginable.    Objective    Patient received individual therapy to increase strength, ROM, flexibility and posture with 0 patients with activities as follows:     Neptali received therapeutic exercises to develop strength, ROM, flexibility and posture for 45 minutes including:   Exercise   Position Sets Reps Hold/Time Equipment/Weight  UBE: fwd/back      5'/5'  L1  OHP: flex/scaption      4'/4'  Shld flex str   Seated  1 5 15"  Wall ladder     1 5  Wall wipes: Cw/Ccw  Standing  2 10 ea  Scap retrac   Seated  3 10  AA shld flex   Supine  2 10   2# dowel   TB: IR/ER   Standing 2 10   RTB  Pulley: rows   Standing 2 10   10#  Pulley: extension  Standing  2 10   10#  B UE ER w scap retrac Seated  2 10   RTB    Neptali received the following manual therapy techniques: PROM, all planes, to tolerance were applied to the: R shoulder for 10 minutes.     Written Home Exercises Provided: NA  Pt demo fair understanding of the education provided. Neptali demonstrated fair return demonstration of activities.     Assessment:   Pt tolerated today's treatment without increase in symptoms. Pt demonstrates decreased R shoulder A/PROM. Pt able to tolerate new AA, AROM and strengthening exercises. Pt occasionally requires VC and TC for proper exercise technique.     Pt will continue to benefit from skilled PT intervention. Medical Necessity is demonstrated by:  Unable to participate fully in daily activities and " Weakness.    Patient is making good progress towards established goals.    New/Revised goals: NA      Plan:  Continue with established Plan of Care towards PT goals.

## 2018-12-05 ENCOUNTER — OFFICE VISIT (OUTPATIENT)
Dept: ORTHOPEDICS | Facility: CLINIC | Age: 68
End: 2018-12-05
Attending: ORTHOPAEDIC SURGERY
Payer: MEDICARE

## 2018-12-05 VITALS — BODY MASS INDEX: 26.03 KG/M2 | WEIGHT: 162 LBS | HEIGHT: 66 IN

## 2018-12-05 DIAGNOSIS — M75.111 INCOMPLETE TEAR OF RIGHT ROTATOR CUFF: Primary | ICD-10-CM

## 2018-12-05 PROCEDURE — 99999 PR PBB SHADOW E&M-EST. PATIENT-LVL III: CPT | Mod: PBBFAC,HCNC,, | Performed by: ORTHOPAEDIC SURGERY

## 2018-12-05 PROCEDURE — 99024 POSTOP FOLLOW-UP VISIT: CPT | Mod: HCNC,S$GLB,, | Performed by: ORTHOPAEDIC SURGERY

## 2018-12-05 NOTE — PROGRESS NOTES
HISTORY OF PRESENT ILLNESS:  Mr. Gonzalez is about six weeks out from right   shoulder arthroscopy and debridement, partial tear.  He is doing well.  He is   currently in therapy.  Pain is minimal.  He is really off all meds.  He is only   four weeks postop.    PHYSICAL EXAMINATION:  RIGHT SHOULDER:  Incision is well healed.  No tenderness, no swelling.  Range of   motion almost full.  A little bit of mild stiffness with elevation.  Strength   improving.  Sensation intact.    PLAN:  Continue therapy, increase activities as tolerated.  Follow up in one   month.      MARKO  dd: 12/05/2018 14:44:18 (CST)  td: 12/06/2018 05:16:07 (CST)  Doc ID   #[id number]  Job ID #[id number]    CC:

## 2018-12-07 ENCOUNTER — CLINICAL SUPPORT (OUTPATIENT)
Dept: REHABILITATION | Facility: HOSPITAL | Age: 68
End: 2018-12-07
Attending: ORTHOPAEDIC SURGERY
Payer: MEDICARE

## 2018-12-07 DIAGNOSIS — M25.519 SHOULDER PAIN, UNSPECIFIED CHRONICITY, UNSPECIFIED LATERALITY: ICD-10-CM

## 2018-12-07 DIAGNOSIS — M25.60 LIMITED JOINT RANGE OF MOTION (ROM): ICD-10-CM

## 2018-12-07 DIAGNOSIS — R29.898 RIGHT ARM WEAKNESS: ICD-10-CM

## 2018-12-07 DIAGNOSIS — M25.619 DECREASED RANGE OF MOTION OF SHOULDER, UNSPECIFIED LATERALITY: ICD-10-CM

## 2018-12-07 PROCEDURE — 97110 THERAPEUTIC EXERCISES: CPT | Mod: HCNC,PN

## 2018-12-07 NOTE — PROGRESS NOTES
Name: Neptali Burleson [facility] Number: [id number]  Date of Treatment: 12/07/2018   Diagnosis:   Encounter Diagnoses   Name Primary?    Right arm weakness     Decreased range of motion of shoulder, unspecified laterality     Shoulder pain, unspecified chronicity, unspecified laterality     Limited joint range of motion (ROM)        Time in: 1055  Time Out: 1154  Total Treatment Time: 59      Subjective:    Neptali reports improvement of symptoms and decreased pain.  Patient reports their pain to be 2/10 on a 0-10 scale with 0 being no pain and 10 being the worst pain imaginable.    Objective  Neptali received therapeutic exercises to develop strength, endurance, ROM, flexibility and posture for 59 minutes including:    UBE 5'fwd/5'bwd   OHP flexion/scaption 4'/4'   Seated shld flexion stretch 5/15s   Wall ladder x10   Wall wipes CW/CCW 3x10    Scapular squeezes 3x10   AA flexion in supine with dowel x 10   IR/ER with RTB x 10   Pulley rows 10# 2 x 10   Pulley extension 10# 2 x 10   B UE ER with scap retraction 2 x 10 RTB    PROM to R shld in supine within patients tolerance.    Written Home Exercises Provided: Cont with HEP  Pt demo good understanding of the education provided. Neptali demonstrated good return demonstration of activities.     Assessment:   Patient needs frequent redirecting with therex  Declined CP stating he will do it at home.  Patient fatigued quickly with strengthening.    Pt will continue to benefit from skilled PT intervention. Medical Necessity is demonstrated by:  Fall Risk, Continued inability to participate in vocational pursuits, Pain limits function of effected part for some activities, Requires skilled supervision to complete and progress HEP and Weakness.    Patient is making fair progress towards established goals.    Plan:  Continue with established Plan of Care towards PT goals.

## 2018-12-13 ENCOUNTER — CLINICAL SUPPORT (OUTPATIENT)
Dept: REHABILITATION | Facility: HOSPITAL | Age: 68
End: 2018-12-13
Attending: ORTHOPAEDIC SURGERY
Payer: MEDICARE

## 2018-12-13 DIAGNOSIS — R29.898 RIGHT ARM WEAKNESS: ICD-10-CM

## 2018-12-13 DIAGNOSIS — M25.511 RIGHT SHOULDER PAIN, UNSPECIFIED CHRONICITY: ICD-10-CM

## 2018-12-13 DIAGNOSIS — M25.60 LIMITED JOINT RANGE OF MOTION (ROM): ICD-10-CM

## 2018-12-13 DIAGNOSIS — M25.611 DECREASED RANGE OF MOTION OF RIGHT SHOULDER: ICD-10-CM

## 2018-12-13 PROCEDURE — 97110 THERAPEUTIC EXERCISES: CPT | Mod: HCNC,PN

## 2018-12-13 PROCEDURE — 97010 HOT OR COLD PACKS THERAPY: CPT | Mod: HCNC,PN

## 2018-12-13 NOTE — PROGRESS NOTES
Name: Neptali Burleson [facility]  Clinic Number: [id number]  Date of Treatment: 12/13/2018   Diagnosis: Incomplete tear R rotator cuff  Encounter Diagnoses   Name Primary?    Right arm weakness     Decreased range of motion of right shoulder     Right shoulder pain, unspecified chronicity     Limited joint range of motion (ROM)        Time in: 1100  Time Out: 1200  Total Treatment Time: 60  Group Time: 0      Subjective:    Neptali reports improvement of symptoms and decreased pain.  Patient reports their pain to be 2/10 on a 0-10 scale with 0 being no pain and 10 being the worst pain imaginable.    Objective    Patient received individual therapy to increase strength, endurance, ROM, flexibility and function with 0 patients with activities as follows:     Neptali received therapeutic exercises to develop strength, endurance, ROM, flexibility and function for 60 minutes including:   Overhead pulleys  PROM/gentle stretch all planes of movement.  AROM against gravity  Gentle isometrics/strengthening  Rhythmicrelaxation techniques.  Scapular mobility and strengthening    The patient received the following supervised modalities after being cleared for contradictions: MHP x 15 min to parascapular and R shoulder area    Active Home Exercises encouraged.  Pt demo good understanding of the education provided. Neptali demonstrated good return demonstration of activities.     Assessment:       Pt will continue to benefit from skilled PT intervention. Medical Necessity is demonstrated by:  Unable to participate fully in daily activities.    Patient is making good progress towards established goals.    Plan:  Continue with established Plan of Care towards PT goals.

## 2018-12-17 ENCOUNTER — CLINICAL SUPPORT (OUTPATIENT)
Dept: REHABILITATION | Facility: HOSPITAL | Age: 68
End: 2018-12-17
Attending: ORTHOPAEDIC SURGERY
Payer: MEDICARE

## 2018-12-17 DIAGNOSIS — R29.898 RIGHT ARM WEAKNESS: ICD-10-CM

## 2018-12-17 DIAGNOSIS — M25.60 LIMITED JOINT RANGE OF MOTION (ROM): ICD-10-CM

## 2018-12-17 DIAGNOSIS — M25.519 SHOULDER PAIN, UNSPECIFIED CHRONICITY, UNSPECIFIED LATERALITY: Primary | ICD-10-CM

## 2018-12-17 PROCEDURE — 97110 THERAPEUTIC EXERCISES: CPT | Mod: HCNC,PN

## 2018-12-17 NOTE — PROGRESS NOTES
Name: Neptali Gonzalez  Clinic Number: [id number]  Date of Treatment: 12/17/2018   Diagnosis:   Encounter Diagnoses   Name Primary?    Shoulder pain, unspecified chronicity, unspecified laterality Yes    Right arm weakness     Limited joint range of motion (ROM)        Time in: 1305  Time Out: 1400  Total Treatment Time: 55  Group Time: NA      Subjective:    Neptali reports mild pain levels in rt shoulder.  Patient reports their pain to be 2/10 on a 0-10 scale with 0 being no pain and 10 being the worst pain imaginable.    Objective    Neptali received therapeutic exercises to develop strength, endurance and ROM for 55 minutes including:     X 4'/4' OHP (flex/scap)  X 20 supine rt shoulder AAROM = flex, ABD, IR/ER  X 10 ladder crawls  X 20 wall towel circles = cw/ccw  X 20 ea t-band PRE's (RTB) = IR/ER  X 20 pulley there ex (20#) = rows, lat pull downs, high rows   X 5'/5' UBE (L1)    Assessment:     Mr. Gonzalez was able to obtain the following rt shoulder AROM from supine (degrees):  Flex=108, ABD=93, ER=20, IR=77.    Pt will continue to benefit from skilled PT intervention. Medical Necessity is demonstrated by:  Pain limits function of effected part for some activities, Unable to participate fully in daily activities and Weakness.    Patient is making fair progress towards established goals.    New/Revised goals: NA      Plan:  Continue with established Plan of Care towards PT goals.

## 2018-12-18 ENCOUNTER — OFFICE VISIT (OUTPATIENT)
Dept: PRIMARY CARE CLINIC | Facility: CLINIC | Age: 68
End: 2018-12-18
Payer: MEDICARE

## 2018-12-18 VITALS
SYSTOLIC BLOOD PRESSURE: 146 MMHG | BODY MASS INDEX: 26.89 KG/M2 | WEIGHT: 167.31 LBS | DIASTOLIC BLOOD PRESSURE: 96 MMHG | HEIGHT: 66 IN | HEART RATE: 57 BPM | OXYGEN SATURATION: 99 %

## 2018-12-18 DIAGNOSIS — E78.2 DYSLIPIDEMIA WITH LOW HIGH DENSITY LIPOPROTEIN (HDL) CHOLESTEROL WITH HYPERTRIGLYCERIDEMIA DUE TO TYPE 2 DIABETES MELLITUS: ICD-10-CM

## 2018-12-18 DIAGNOSIS — Z12.11 COLON CANCER SCREENING: ICD-10-CM

## 2018-12-18 DIAGNOSIS — E11.8 TYPE 2 DIABETES MELLITUS WITH COMPLICATION, WITHOUT LONG-TERM CURRENT USE OF INSULIN: ICD-10-CM

## 2018-12-18 DIAGNOSIS — S46.001D INJURY OF RIGHT ROTATOR CUFF, SUBSEQUENT ENCOUNTER: ICD-10-CM

## 2018-12-18 DIAGNOSIS — E11.69 DYSLIPIDEMIA WITH LOW HIGH DENSITY LIPOPROTEIN (HDL) CHOLESTEROL WITH HYPERTRIGLYCERIDEMIA DUE TO TYPE 2 DIABETES MELLITUS: ICD-10-CM

## 2018-12-18 DIAGNOSIS — I15.2 HYPERTENSION ASSOCIATED WITH DIABETES: ICD-10-CM

## 2018-12-18 DIAGNOSIS — E11.59 HYPERTENSION ASSOCIATED WITH DIABETES: ICD-10-CM

## 2018-12-18 PROBLEM — Z01.818 PREOP EXAM FOR INTERNAL MEDICINE: Status: RESOLVED | Noted: 2018-11-05 | Resolved: 2018-12-18

## 2018-12-18 PROCEDURE — 3045F PR MOST RECENT HEMOGLOBIN A1C LEVEL 7.0-9.0%: CPT | Mod: CPTII,HCNC,S$GLB, | Performed by: INTERNAL MEDICINE

## 2018-12-18 PROCEDURE — 3080F DIAST BP >= 90 MM HG: CPT | Mod: CPTII,HCNC,S$GLB, | Performed by: INTERNAL MEDICINE

## 2018-12-18 PROCEDURE — 1101F PT FALLS ASSESS-DOCD LE1/YR: CPT | Mod: CPTII,HCNC,S$GLB, | Performed by: INTERNAL MEDICINE

## 2018-12-18 PROCEDURE — 3077F SYST BP >= 140 MM HG: CPT | Mod: CPTII,HCNC,S$GLB, | Performed by: INTERNAL MEDICINE

## 2018-12-18 PROCEDURE — 99215 OFFICE O/P EST HI 40 MIN: CPT | Mod: HCNC,S$GLB,, | Performed by: INTERNAL MEDICINE

## 2018-12-18 RX ORDER — LISINOPRIL 10 MG/1
10 TABLET ORAL DAILY
Qty: 90 TABLET | Refills: 3 | Status: SHIPPED | OUTPATIENT
Start: 2018-12-18 | End: 2019-07-10 | Stop reason: SDUPTHER

## 2018-12-18 RX ORDER — METFORMIN HYDROCHLORIDE 500 MG/1
1000 TABLET, EXTENDED RELEASE ORAL
Qty: 180 TABLET | Refills: 3 | Status: SHIPPED | OUTPATIENT
Start: 2018-12-18 | End: 2019-07-11 | Stop reason: SDUPTHER

## 2018-12-18 NOTE — PATIENT INSTRUCTIONS
TODAY:  - recheck BP  - order freestyle apolinar  - colonoscopy ordered   + Please call [phone] to schedule your colonoscopy. Ask them how to schedule in [place].    NEXT:  - labs

## 2018-12-18 NOTE — ASSESSMENT & PLAN NOTE
MRI 3/2017 showed partial thickness bursal surface tear of the supraspinatus tendon  · Surgical repair with Dr Thurston in 11/2018  · Continue OT
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
## 2018-12-21 ENCOUNTER — CLINICAL SUPPORT (OUTPATIENT)
Dept: REHABILITATION | Facility: HOSPITAL | Age: 68
End: 2018-12-21
Attending: ORTHOPAEDIC SURGERY
Payer: MEDICARE

## 2018-12-21 DIAGNOSIS — M25.619 DECREASED RANGE OF MOTION OF SHOULDER, UNSPECIFIED LATERALITY: ICD-10-CM

## 2018-12-21 DIAGNOSIS — M25.60 LIMITED JOINT RANGE OF MOTION (ROM): ICD-10-CM

## 2018-12-21 DIAGNOSIS — R29.898 RIGHT ARM WEAKNESS: ICD-10-CM

## 2018-12-21 DIAGNOSIS — M25.519 SHOULDER PAIN, UNSPECIFIED CHRONICITY, UNSPECIFIED LATERALITY: ICD-10-CM

## 2018-12-21 DIAGNOSIS — Z12.11 SPECIAL SCREENING FOR MALIGNANT NEOPLASMS, COLON: Primary | ICD-10-CM

## 2018-12-21 PROCEDURE — 97110 THERAPEUTIC EXERCISES: CPT | Mod: HCNC,PN

## 2018-12-21 PROCEDURE — 97140 MANUAL THERAPY 1/> REGIONS: CPT | Mod: HCNC,PN

## 2018-12-21 RX ORDER — POLYETHYLENE GLYCOL 3350, SODIUM SULFATE ANHYDROUS, SODIUM BICARBONATE, SODIUM CHLORIDE, POTASSIUM CHLORIDE 236; 22.74; 6.74; 5.86; 2.97 G/4L; G/4L; G/4L; G/4L; G/4L
POWDER, FOR SOLUTION ORAL
Qty: 4000 ML | Refills: 0 | Status: SHIPPED | OUTPATIENT
Start: 2018-12-21

## 2018-12-21 NOTE — PROGRESS NOTES
"Name: Neptali Burleson [facility] Number: [id number]  Date of Treatment: 12/21/2018   Diagnosis:   Encounter Diagnoses   Name Primary?    Right arm weakness     Decreased range of motion of shoulder, unspecified laterality     Shoulder pain, unspecified chronicity, unspecified laterality     Limited joint range of motion (ROM)        Time in: 1306  Time Out: 1407  Total Treatment Time: 61 min.  Group Time: n/a      Subjective:    Neptali reports slight improvement of symptoms overall. His shldr pain is staying about the same.   Patient reports their pain to be 3/10 on a 0-10 scale with 0 being no pain and 10 being the worst pain imaginable.    Objective    Patient received individual therapy to increase strength, ROM and flexibility with 0 patients with activities as follows:     Neptali received therapeutic exercises to  develop strength, ROM and flexibility for 48 minutes including: OHP flex/scap, 4'/4'; UBE, Lv1.5, 5'/5'; BUE ER w/scap ret., RTB, 2x10; wall ladder, flex, up to #22, 6x; R shldr seated flex/ABD str's, 5 x 15"; seated scap ret., x 30; wall wipes, cw/ccw, x20 ea; pulleys, 20#, rows/ext., 2x10 ea; RTB ER/IR, AROM, 2x10 ea.                          Neptali received the following manual therapy techniques: PROM, all directions, were applied to the: R shldr, in sup, for 10' minutes.               Written Home Exercises Provided: yes, previously         Assessment:       Pt will continue to benefit from skilled PT intervention. Medical Necessity is demonstrated by:  Pain limits function of effected part for some activities, Unable to participate fully in daily activities and Weakness. Neptali(Marko) had some difficulty w/OHP- R shldr affected by hand carpal tunnel symptoms and tight elbow extensors keeping R elbow from extending and shldr.  Stays abducted; pt. AMB that way as well. Pt. mariela exer's and MT ok, w/fair function and no incr in pain.    Patient is making fair progress towards established " goals.    New/Revised goals: N/A      Plan:  Continue with established Plan of Care towards PT goals.

## 2018-12-28 ENCOUNTER — CLINICAL SUPPORT (OUTPATIENT)
Dept: REHABILITATION | Facility: HOSPITAL | Age: 68
End: 2018-12-28
Attending: ORTHOPAEDIC SURGERY
Payer: MEDICARE

## 2018-12-28 DIAGNOSIS — M25.60 LIMITED JOINT RANGE OF MOTION (ROM): ICD-10-CM

## 2018-12-28 DIAGNOSIS — M25.519 SHOULDER PAIN, UNSPECIFIED CHRONICITY, UNSPECIFIED LATERALITY: ICD-10-CM

## 2018-12-28 DIAGNOSIS — R29.898 RIGHT ARM WEAKNESS: ICD-10-CM

## 2018-12-28 DIAGNOSIS — M25.619 DECREASED RANGE OF MOTION OF SHOULDER, UNSPECIFIED LATERALITY: ICD-10-CM

## 2018-12-28 PROCEDURE — 97010 HOT OR COLD PACKS THERAPY: CPT | Mod: HCNC,PN

## 2018-12-28 PROCEDURE — 97140 MANUAL THERAPY 1/> REGIONS: CPT | Mod: HCNC,PN

## 2018-12-28 PROCEDURE — 97110 THERAPEUTIC EXERCISES: CPT | Mod: HCNC,PN

## 2018-12-28 NOTE — PROGRESS NOTES
"Name: Neptali Burleson [facility] Number: [id number]  Date of Treatment: 12/28/2018   Diagnosis:   Encounter Diagnoses   Name Primary?    Right arm weakness     Decreased range of motion of shoulder, unspecified laterality     Shoulder pain, unspecified chronicity, unspecified laterality     Limited joint range of motion (ROM)        Time in: 1102  Time Out: 1210  Total Treatment Time: 64  Group Time: 0      Subjective:    Neptali reports "It's about the same".  Patient reports their pain to be 2/10 on a 0-10 scale with 0 being no pain and 10 being the worst pain imaginable.    Objective    Patient received individual therapy to increase strength, ROM, flexibility and posture with 0 patients with activities as follows:     Neptali received therapeutic exercises to develop strength, ROM, flexibility and posture for 42 minutes including:      UBE 5'/5' (L1)       4'/4' OHP (flex/scap)                Ladder crawls To @ 22 x 6   Wall towel circles = cw/ccw x 20 ea                 T-band PRE's (RTB) = IR/ER x 30 ea   Pulley there ex (20#) = low rows, shoulder ext x 30 ea    Seated shoulder flex stretch 5x15s (with manual assistance by PT)   Seated shoulder abd stretch 5x15s (with manual assistance by PT)    Neptali received the following manual therapy techniques: Joint mobilizations (grade 2 - 2+ anterior to posterior, superior to inferior, and lateral glides) and PROM were applied to the: R shoulder for 12 minutes.     The patient received the following supervised modalities after being cleared for contradictions: CP to R shoulder x 10 min    Written Home Exercises Provided: Reviewed.    Pt demo fair understanding of the education provided. Neptali demonstrated fair return demonstration of activities.     Assessment:   Achieved the following ROM in supine:  Shoulder flexion 120*, abd 95*, ER 30*, IR 60*  This demonstrates an improvement in AROM of R shoulder flexion and ER, a slight improvement in R shoulder abd and a " decline of R shoulder IR in supine.      Pt will continue to benefit from skilled PT intervention. Medical Necessity is demonstrated by:  Pain limits function of effected part for some activities, Unable to participate fully in daily activities, Requires skilled supervision to complete and progress HEP and Weakness.    Patient is making good progress towards established goals.    New/Revised goals: N.A this date.        Plan:  Continue with established Plan of Care towards PT goals.

## 2018-12-31 ENCOUNTER — CLINICAL SUPPORT (OUTPATIENT)
Dept: REHABILITATION | Facility: HOSPITAL | Age: 68
End: 2018-12-31
Attending: ORTHOPAEDIC SURGERY
Payer: MEDICARE

## 2018-12-31 DIAGNOSIS — M25.60 LIMITED JOINT RANGE OF MOTION (ROM): ICD-10-CM

## 2018-12-31 DIAGNOSIS — M25.519 SHOULDER PAIN, UNSPECIFIED CHRONICITY, UNSPECIFIED LATERALITY: Primary | ICD-10-CM

## 2018-12-31 DIAGNOSIS — R29.898 RIGHT ARM WEAKNESS: ICD-10-CM

## 2018-12-31 PROCEDURE — 97110 THERAPEUTIC EXERCISES: CPT | Mod: HCNC,PN

## 2018-12-31 NOTE — PROGRESS NOTES
Name: Neptali Gonzalez  Clinic Number: [id number]  Date of Treatment: 12/31/2018   Diagnosis:   Encounter Diagnoses   Name Primary?    Shoulder pain, unspecified chronicity, unspecified laterality Yes    Right arm weakness     Limited joint range of motion (ROM)        Time in: 1110  Time Out: 1155  Total Treatment Time: 45  Group Time: NA      Subjective:    Neptali reports mild pain levels in rt shoulder.  Patient reports their pain to be 2/10 on a 0-10 scale with 0 being no pain and 10 being the worst pain imaginable.    Objective    Neptali received therapeutic exercises to develop strength, endurance and ROM for 45 minutes including:     X 5'/5' OHP (flex/scap)  X 20 supine rt shoulder AAROM = flex, ABD, IR/ER  X 10 ladder crawls  X 20 wall towel circles = cw/ccw  X 20 ea dowel assisted AAROM (supine) = flex, ABD, ER    Assessment:     Mr. Gonzalez was able to mariela. tx session w/out increase in symptoms.    Pt will continue to benefit from skilled PT intervention. Medical Necessity is demonstrated by:  Pain limits function of effected part for some activities, Unable to participate fully in daily activities and Weakness.    Patient is making fair progress towards established goals.    New/Revised goals: NA      Plan:  Continue with established Plan of Care towards PT goals.

## 2019-01-04 ENCOUNTER — TELEPHONE (OUTPATIENT)
Dept: REHABILITATION | Facility: HOSPITAL | Age: 69
End: 2019-01-04

## 2019-01-07 ENCOUNTER — TELEPHONE (OUTPATIENT)
Dept: INTERNAL MEDICINE | Facility: CLINIC | Age: 69
End: 2019-01-07

## 2019-01-07 NOTE — TELEPHONE ENCOUNTER
Please fax script for a continuous glucose monitor to [facility]. Afterwards, add MRN and scan the script to chart.    Thanks,  KJ

## 2019-01-11 ENCOUNTER — TELEPHONE (OUTPATIENT)
Dept: REHABILITATION | Facility: HOSPITAL | Age: 69
End: 2019-01-11

## 2019-02-01 ENCOUNTER — TELEPHONE (OUTPATIENT)
Dept: INTERNAL MEDICINE | Facility: CLINIC | Age: 69
End: 2019-02-01

## 2019-02-01 NOTE — TELEPHONE ENCOUNTER
Please talk to patient and see if he is willing to check his glucose 4X daily, so that we can get him the glucometer that he waves over his arm for readings?    Thanks,  KJ  ----- Message from Anthony Baum sent at 1/31/2019  3:33 PM CST -----  I called [facility] and spoke to Yumiko. I explained that the patient had a stroke and is physically unable to inject insulin. She said that we need to still show that he is testing 4 times/day, demonstrate that he is physically unable to inject insulin, and then they might be able to approve the pump.

## 2019-03-07 NOTE — PROGRESS NOTES
TIME RECORD    Date:  03/07/2019    Start Time:  1300  Stop Time:  1400    PROCEDURES: Speech-Language Therapy    Total Timed Minutes:  0  Total Timed Units:0  Total Untimed Units:  1  Charges Billed/# of units: 1      Progress/Current Status    Subjective:     Patient ID: Neptali Gonzalez is a 68 y.o. male.  Diagnosis:   1. Aphasia     2. Cognitive impairment         Pt compliant with therapy procedures    Objective:     Establish therapy baselines  Instruct pt in results of evaluation  Instruct pt is goals and objectives of therapy    Assessment:     Therapy baselines taken and congruent with evaluation results (see 03/07/18 report).  In spite of previous course of therapy, pt exhibited confusion regarding objectives of therapy and is not aware of the weakness/impairments for which he is receiving therapy. Pt does not have perspective of his functional deficits as experienced in his ADLs. Pt requires ongoing education to facilitate his understanding of deficits to be addressed and strategies to implement  Patient Education/Response:     Ongoing    Plans and Goals:     Continue POC for cognitive-linguistic impairments

## 2019-05-02 ENCOUNTER — OFFICE VISIT (OUTPATIENT)
Dept: PRIMARY CARE CLINIC | Facility: CLINIC | Age: 69
End: 2019-05-02
Payer: MEDICARE

## 2019-05-02 ENCOUNTER — TELEPHONE (OUTPATIENT)
Dept: INTERNAL MEDICINE | Facility: CLINIC | Age: 69
End: 2019-05-02

## 2019-05-02 VITALS
HEART RATE: 94 BPM | OXYGEN SATURATION: 97 % | WEIGHT: 159.63 LBS | DIASTOLIC BLOOD PRESSURE: 90 MMHG | SYSTOLIC BLOOD PRESSURE: 160 MMHG | HEIGHT: 66 IN | BODY MASS INDEX: 25.66 KG/M2

## 2019-05-02 DIAGNOSIS — I69.351 HEMIPLEGIA AND HEMIPARESIS FOLLOWING CEREBRAL INFARCTION AFFECTING RIGHT DOMINANT SIDE: ICD-10-CM

## 2019-05-02 DIAGNOSIS — I70.0 AORTIC ARCH ATHEROSCLEROSIS: ICD-10-CM

## 2019-05-02 DIAGNOSIS — R48.2 APRAXIA: ICD-10-CM

## 2019-05-02 DIAGNOSIS — T40.601A NARCOTIC OVERDOSE, ACCIDENTAL OR UNINTENTIONAL, INITIAL ENCOUNTER: ICD-10-CM

## 2019-05-02 DIAGNOSIS — E11.69 DYSLIPIDEMIA WITH LOW HIGH DENSITY LIPOPROTEIN (HDL) CHOLESTEROL WITH HYPERTRIGLYCERIDEMIA DUE TO TYPE 2 DIABETES MELLITUS: ICD-10-CM

## 2019-05-02 DIAGNOSIS — E78.2 DYSLIPIDEMIA WITH LOW HIGH DENSITY LIPOPROTEIN (HDL) CHOLESTEROL WITH HYPERTRIGLYCERIDEMIA DUE TO TYPE 2 DIABETES MELLITUS: ICD-10-CM

## 2019-05-02 DIAGNOSIS — I15.2 HYPERTENSION ASSOCIATED WITH DIABETES: ICD-10-CM

## 2019-05-02 DIAGNOSIS — I77.1 ILIAC ARTERY STENOSIS, RIGHT: ICD-10-CM

## 2019-05-02 DIAGNOSIS — R41.82 ACUTE ALTERATION IN MENTAL STATUS: ICD-10-CM

## 2019-05-02 DIAGNOSIS — E11.59 HYPERTENSION ASSOCIATED WITH DIABETES: ICD-10-CM

## 2019-05-02 DIAGNOSIS — D69.2 SENILE PURPURA: ICD-10-CM

## 2019-05-02 DIAGNOSIS — I77.819 ECTATIC AORTA: ICD-10-CM

## 2019-05-02 PROCEDURE — 3077F SYST BP >= 140 MM HG: CPT | Mod: HCNC,CPTII,S$GLB, | Performed by: INTERNAL MEDICINE

## 2019-05-02 PROCEDURE — 3045F PR MOST RECENT HEMOGLOBIN A1C LEVEL 7.0-9.0%: CPT | Mod: HCNC,CPTII,S$GLB, | Performed by: INTERNAL MEDICINE

## 2019-05-02 PROCEDURE — 99215 PR OFFICE/OUTPT VISIT, EST, LEVL V, 40-54 MIN: ICD-10-PCS | Mod: HCNC,S$GLB,, | Performed by: INTERNAL MEDICINE

## 2019-05-02 PROCEDURE — 3045F PR MOST RECENT HEMOGLOBIN A1C LEVEL 7.0-9.0%: ICD-10-PCS | Mod: HCNC,CPTII,S$GLB, | Performed by: INTERNAL MEDICINE

## 2019-05-02 PROCEDURE — 1101F PT FALLS ASSESS-DOCD LE1/YR: CPT | Mod: HCNC,CPTII,S$GLB, | Performed by: INTERNAL MEDICINE

## 2019-05-02 PROCEDURE — 3080F DIAST BP >= 90 MM HG: CPT | Mod: HCNC,CPTII,S$GLB, | Performed by: INTERNAL MEDICINE

## 2019-05-02 PROCEDURE — 1101F PR PT FALLS ASSESS DOC 0-1 FALLS W/OUT INJ PAST YR: ICD-10-PCS | Mod: HCNC,CPTII,S$GLB, | Performed by: INTERNAL MEDICINE

## 2019-05-02 PROCEDURE — 99215 OFFICE O/P EST HI 40 MIN: CPT | Mod: HCNC,S$GLB,, | Performed by: INTERNAL MEDICINE

## 2019-05-02 PROCEDURE — 3080F PR MOST RECENT DIASTOLIC BLOOD PRESSURE >= 90 MM HG: ICD-10-PCS | Mod: HCNC,CPTII,S$GLB, | Performed by: INTERNAL MEDICINE

## 2019-05-02 PROCEDURE — 3077F PR MOST RECENT SYSTOLIC BLOOD PRESSURE >= 140 MM HG: ICD-10-PCS | Mod: HCNC,CPTII,S$GLB, | Performed by: INTERNAL MEDICINE

## 2019-05-02 RX ORDER — NALOXONE HYDROCHLORIDE 4 MG/.1ML
1 SPRAY NASAL ONCE
Status: COMPLETED | OUTPATIENT
Start: 2019-05-02 | End: 2019-05-02

## 2019-05-02 RX ORDER — NALOXONE HYDROCHLORIDE 0.4 MG/ML
0.4 INJECTION, SOLUTION INTRAMUSCULAR; INTRAVENOUS; SUBCUTANEOUS ONCE
Status: DISCONTINUED | OUTPATIENT
Start: 2019-05-02 | End: 2019-05-02

## 2019-05-02 RX ORDER — NALOXONE HYDROCHLORIDE 4 MG/.1ML
1 SPRAY NASAL ONCE
Qty: 2 EACH | Refills: 0 | Status: SHIPPED | OUTPATIENT
Start: 2019-05-02 | End: 2019-05-02

## 2019-05-02 RX ADMIN — NALOXONE HYDROCHLORIDE 4 MG: 4 SPRAY NASAL at 03:05

## 2019-05-02 NOTE — ASSESSMENT & PLAN NOTE
Patient with notable mentation difficulties in clinic 8 hours after taking percocet prescribed by dentist.  · 2 doses of narcan given in clinic with return of mentation  · Patient advised to never take narcotics again  · Intolerance to narcotics documented in chart  · Dr Thurston and Dr Ra Mackenzie (dentist) contacted to request avoidance of narcotics for pain control for this patient

## 2019-05-02 NOTE — Clinical Note
Dr Thurston,Mr Lisa had significant AMS in clinic today following use of a narcotic script from his dentist. His mentation improved with narcan, and he was provided an emergency narcan script and advised to avoid narcotics in the future. I added it as an intolerance. On  review you also gave patient narcotics for his shoulder surgery. Should he need another surgery, could you consider other pain control options for him? I'm expressing the same request to his dentist. Patient is in agreement.Thanks!MICK (PCP)

## 2019-05-02 NOTE — PATIENT INSTRUCTIONS
TODAY:  - follow-up in 2 weeks  - DO NOT TAKE ANY MORE NARCOTICS  - see Dr Thurston for shoulder issues  - nurse to document your injury in clinic  - narcan given in clinic  -  emergency pack at pharmacy today

## 2019-05-02 NOTE — PROGRESS NOTES
"Primary Care Provider Appointment    Subjective:      Patient ID: Neptali Gonzalez is a 68 y.o. male with HLD, hemiparesis, HTN, DM    Chief Complaint: Arm Pain (right arm ) and Follow-up    Patient is requesting a "a patch" to be put on his shoulder. He states this was done 5 mos ago. On chart review, patient has requested a freestyle apolinar glucometer. He was explained that to be eligible for it he needs to check glucose 3 times daily, and inject insulin.    He has chronic R shoulder pain, has appt with Dr Thurston for surgery eval. Had arthroscopy in 11/2018.     Patient is notably confused and dis-sheveled today. He reports starting narcotics with his dentist two weeks ago for tooth extractions. On  review of narcotics, he also has 2 benzo scripts for a few tablets.     Patient was laid off by [employer], and is applying for other work. Denies depression but is not in his usual mental state.    NOTE: While PCP was out of the room, patient developed a bleeding R hand ulcer for unknown reason that was cleaned and wrapped with coban pressure dressing. He was recently prescribed narcotic from his dentist and numerous benzos. Appeared dis-sheveled and confused in clinic, yet insisted on driving to [employer] today. He was given narcan in clinicX2 and improved mental status. Clinic nurse documented the injury.    Past Surgical History:   Procedure Laterality Date    ARTHROSCOPY WITH DISTAL CLAVICLE  EXCISION AND SUBACROMIAL  DECOMPRESSION Right 11/7/2018    [facility]ston Jr[facility] OR    CARDIAC SURGERY      CORONARY STENT PLACEMENT      CYSTOSCOPY, WITH URETERAL STENT INSERTION N/A 2/28/2012    [facility]    [facility] stent 2007      INJECTION-STEROID-EPIDURAL-CERVICAL N/A 1/30/2017    [facility] OR    LITHOTRIPSY      LITHOTRIPSY-EXTRACORPOREAL SHOCK WAVE Right 10/29/2014    [facility] OR    " RELEASE-CARPAL TUNNEL Right 8/15/2016    [facility] Orlando [facility] OR    TONSILLECTOMY         Past Medical History:   Diagnosis Date    CKD (chronic kidney disease)     Coronary artery disease 2002    stent    Diabetes mellitus     Hypertension     Kidney stones     MI (myocardial infarction)     Stroke        Social History     Socioeconomic History    Marital status:      Spouse name: Not on file    Number of children: Not on file    Years of education: Not on file    Highest education level: Not on file   Occupational History    Not on file   Social Needs    Financial resource strain: Not on file    Food insecurity:     Worry: Not on file     Inability: Not on file    Transportation needs:     Medical: Not on file     Non-medical: Not on file   Tobacco Use    Smoking status: Former Smoker     Packs/day: 1.00     Years: 3.00     Pack years: 3.00     Start date: 1/1/1967    Smokeless tobacco: Never Used   Substance and Sexual Activity    Alcohol use: No    Drug use: No    Sexual activity: Yes     Partners: Male     Birth control/protection: Condom   Lifestyle    Physical activity:     Days per week: Not on file     Minutes per session: Not on file    Stress: Not on file   Relationships    Social connections:     Talks on phone: Not on file     Gets together: Not on file     Attends Jewish service: Not on file     Active member of club or organization: Not on file     Attends meetings of clubs or organizations: Not on file     Relationship status: Not on file   Other Topics Concern    Not on file   Social History Narrative    Not on file       Review of Systems   Constitutional: Negative for activity change, appetite change, fatigue and fever.   Respiratory: Negative for shortness of breath.    Cardiovascular: Negative for chest pain and leg swelling.   Musculoskeletal: Positive for arthralgias, back pain and joint swelling.   Skin: Positive for wound.  "  Neurological: Positive for tremors, weakness and numbness. Negative for dizziness, seizures and speech difficulty.   Hematological: Bruises/bleeds easily.   Psychiatric/Behavioral: Negative for confusion and decreased concentration. The patient is not nervous/anxious.        Objective:   BP (!) 160/90   Pulse 94   Ht 5' 6" (1.676 m)   Wt 72.4 kg (159 lb 9.8 oz)   SpO2 97%   BMI 25.76 kg/m²     Physical Exam   Constitutional: He is oriented to person, place, and time. He appears well-developed and well-nourished.   HENT:   Head: Normocephalic.   Eyes: EOM are normal.   Musculoskeletal: Normal range of motion.   Neurological: He is alert and oriented to person, place, and time.   Mild resting tremor in R hand (improved since last exam)  Abnormal mentation  Decreased short term memory   Skin: Skin is warm and dry.   Ecchymoses and purpura on UE bilaterally  Wound on R hand, bleeding improved with pressure dressing   Psychiatric: He has a normal mood and affect. His behavior is normal. Judgment and thought content normal.   Nursing note and vitals reviewed.      Lab Results   Component Value Date    WBC 7.97 06/25/2018    HGB 16.1 06/25/2018    HCT 45.5 06/25/2018     06/25/2018    CHOL 175 06/25/2018    TRIG 193 (H) 06/25/2018    HDL 41 06/25/2018    ALT 21 06/25/2018    AST 15 06/25/2018     06/25/2018    K 4.2 06/25/2018     06/25/2018    CREATININE 1.3 06/25/2018    BUN 14 06/25/2018    CO2 28 06/25/2018    INR 1.1 05/25/2016    HGBA1C 7.4 (H) 06/25/2018               RESULTS: Reviewed labs and images today    Assessment:   68 y.o. male with multiple co-morbid illnesses here to continue work-up of chronic issues notably with HLD, hemiparesis, HTN, DM    Plan:     Problem List Items Addressed This Visit        Neuro    Apraxia     Apraxia of R hand  · Follow-up Neuro  · Continue tertiary stroke prevention         Hemiplegia and hemiparesis following cerebral infarction affecting right " dominant side     MRI L hemisphere infarction  · Continue teritary stroke prevention  · Continue OT and speech therapy  · F/U Neuro PRN            Cardiac/Vascular    Dyslipidemia with low high density lipoprotein (HDL) cholesterol with hypertriglyceridemia due to type 2 diabetes mellitus     HDL 37, but ASCVD risk high, compliant with atorvastatin 40mg  · Continue statin   · Continue metformin 500mg   · Planned to increase to BID pending kidney function and A1c  · Patient unable to be compliant with BID dosing  · Continue glipizide 5mg  · Digital diabetes program enrollment  · Is not eligible for the freestyle apolinar (verified with insurance company)         Aortic arch atherosclerosis     Aortic arch atherosclerosis on CTA head/neck on 5/5/17. Compliant with high intensity statin, ASA  · Continue statin, ASA  · Improve BP control  · Bring BP log to appts  · Consider cardiology consult once acute issues addressed         Ectatic aorta     Abdominal aorta ectasis seen on [facility] 5/2017  · Continue secondary prevention with APT, statin, BP, DM control  · Will refer to Cardiology in future         Iliac artery stenosis, right     R iliac artery stenosis seen on [facility] in 5/2017, stent placed at [facility] in 2007  · Cardiology referral once acute issues addressed  · Continue tertiary prevention with APT, statin, BP control, DM control         Hypertension associated with diabetes     BP controlled on ACE, CCB  · Continue amlodipine 10mg, lisinopril 10mg  · Continue statin & ASA            Hematology    Senile purpura     Ecchymoses and pupura on UE bilaterally  · Continue APT  · Advised mindfulness of movements            Endocrine    Uncontrolled secondary diabetes mellitus with stage 3 CKD (GFR 30-59)     GFR 51.6, A1c 6.1 on 11/2017  · Continue metformin 500mg XR  · Monitor kidney function q3 mos  · Continue ACE  · Check urine protein/cr ratio            Other    Acute alteration in mental status     Patient  recently lost his job, and was started on narcotics by dentist.  ·  report printed  · Post-surgical narcotics from Dr Thurston and Dr Mackenzie  · Narcan given in clinic  · Specialists informed of patient's mis-use of narcotics         Relevant Medications    naloxone nasal spray 4 mg (Completed) (Start on 5/2/2019  4:15 PM)    naloxone nasal spray 4 mg (Completed) (Start on 5/2/2019  4:30 PM)    naloxone (NARCAN) 4 mg/actuation Spry    Narcotic overdose     Patient with notable mentation difficulties in clinic 8 hours after taking percocet prescribed by dentist.  · 2 doses of narcan given in clinic with return of mentation  · Patient advised to never take narcotics again  · Intolerance to narcotics documented in chart  · Dr Thurston and Dr Ra Mackenzie (dentist) contacted to request avoidance of narcotics for pain control for this patient               Health Maintenance       Date Due Completion Date    TETANUS VACCINE 10/12/1967 ---    Shingles Vaccine (1 of 2) 10/12/1999 ---    Colonoscopy 10/12/1999 ---    Hemoglobin A1c 02/27/2019 8/27/2018    Lipid Panel 06/25/2019 6/25/2018    Eye Exam 07/23/2019 7/23/2018    Override on 5/23/2017: Done (Eye Cam done)    Influenza Vaccine 08/01/2019 11/5/2018    Override on 10/6/2017: Done    Foot Exam 09/14/2019 9/14/2018 (Done)    Override on 9/14/2018: Done    High Dose Statin 05/02/2020 5/2/2019    Aspirin/Antiplatelet Therapy 05/02/2020 5/2/2019          Follow up in about 2 weeks (around 5/16/2019). Total face-to-face time was 60 min, 50% of this was spent on counseling and coordination of care. The following issues were discussed: with HLD, hemiparesis, HTN, DM    Karen Chacon MD/MPH  Internal Medicine  [facility]  [phone]

## 2019-05-02 NOTE — TELEPHONE ENCOUNTER
Does have a neighbor that comes to check on him, been out of work since the first of the month, worked for [employer], works with the [employer], states he does busy work for them, Patient received a Narcotic for dental work, it was last taken this am at 8, we gave him a dose of Narcan right nasal 4 mg Given at 15:13pm, he tolerated it well, 2 minutes later he stated he felt more controlled, and the jitteriness stopped,    BP 1. 200/108 manual BP 2 190/102, Dinamap /84 @1322, all taken left arm, 180/88 HR 94 PUlse ox 94%, phone number .  15:27 173/86 HR 89, second dose of Narcan given at 15:28, to left nare by nurse Siomara Wei LPN, Bp check after second dose of Narcan .  169/85 HR 90.

## 2019-05-02 NOTE — ASSESSMENT & PLAN NOTE
MRI L hemisphere infarction  · Continue teritary stroke prevention  · Continue OT and speech therapy  · F/U Neuro PRN

## 2019-05-02 NOTE — ASSESSMENT & PLAN NOTE
HDL 37, but ASCVD risk high, compliant with atorvastatin 40mg  · Continue statin   · Continue metformin 500mg   · Planned to increase to BID pending kidney function and A1c  · Patient unable to be compliant with BID dosing  · Continue glipizide 5mg  · Digital diabetes program enrollment  · Is not eligible for the freestyle apolinar (verified with insurance company)

## 2019-05-02 NOTE — LETTER
May 2, 2019    Dr Ra Mackenzie  [facility]  Primary Care  [address]  [facility] [address]  Phone: [phone]  Fax: [phone]   May 2, 2019     Patient: Neptali Gonzalez   YOB: 1950   Date of Visit: 5/2/2019       To Dr Mackenzie and Staff:    Neptali Gonzalez was seen in my clinic on 5/2/2019. He had significant mentation issues following ingestion of a narcotic (hydrocodone-acetaminophen) that was prescribed on 4/26/19 by Dr Mackenzie. He was given 2 doses of narcan with improvement and advised to never take narcotics again, please see attached note for details.    Please do not continue prescribing narcotics for this patient, and consider alternative methods of pain control for him. Please consider adding hydrocodone as an intolerance in his chart.    If you have any questions or concerns, please don't hesitate to call.    Sincerely,         Karen Chacon MD
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
## 2019-05-02 NOTE — ASSESSMENT & PLAN NOTE
Patient recently lost his job, and was started on narcotics by dentist.  ·  report printed  · Post-surgical narcotics from Dr Thurston and Dr Mackenzie  · Narcan given in clinic  · Specialists informed of patient's mis-use of narcotics

## 2019-05-02 NOTE — ASSESSMENT & PLAN NOTE
R iliac artery stenosis seen on AA US in 5/2017, stent placed at [facility] in 2007  · Cardiology referral once acute issues addressed  · Continue tertiary prevention with APT, statin, BP control, DM control

## 2019-05-03 ENCOUNTER — TELEPHONE (OUTPATIENT)
Dept: PRIMARY CARE CLINIC | Facility: CLINIC | Age: 69
End: 2019-05-03

## 2019-05-03 NOTE — TELEPHONE ENCOUNTER
Spoke with dental office they stated pt is still having to  Have many procedures done, he is not sure if the pt was taking more than what he was suppose to but if there is anything else thaat you need the dentist to do for pt advise

## 2019-05-03 NOTE — TELEPHONE ENCOUNTER
----- Message from Paul Dial sent at 5/3/2019 10:08 AM CDT -----  Contact: Dental office Dr Jesus [phone]  Dental office calling requesting to speak with office regarding patient, would like a call back to discuss. Will be available to speak after 11am today. Dental office Dr Jesus [phone]    Please call an advise  Thank you

## 2019-05-08 ENCOUNTER — OFFICE VISIT (OUTPATIENT)
Dept: ORTHOPEDICS | Facility: CLINIC | Age: 69
End: 2019-05-08
Attending: ORTHOPAEDIC SURGERY
Payer: MEDICARE

## 2019-05-08 VITALS — HEIGHT: 66 IN | BODY MASS INDEX: 25.55 KG/M2 | WEIGHT: 159 LBS

## 2019-05-08 DIAGNOSIS — M75.111 INCOMPLETE TEAR OF RIGHT ROTATOR CUFF: Primary | ICD-10-CM

## 2019-05-08 PROCEDURE — 99999 PR PBB SHADOW E&M-EST. PATIENT-LVL III: CPT | Mod: PBBFAC,HCNC,, | Performed by: ORTHOPAEDIC SURGERY

## 2019-05-08 PROCEDURE — 99213 PR OFFICE/OUTPT VISIT, EST, LEVL III, 20-29 MIN: ICD-10-PCS | Mod: HCNC,S$GLB,, | Performed by: ORTHOPAEDIC SURGERY

## 2019-05-08 PROCEDURE — 99213 OFFICE O/P EST LOW 20 MIN: CPT | Mod: HCNC,S$GLB,, | Performed by: ORTHOPAEDIC SURGERY

## 2019-05-08 PROCEDURE — 99999 PR PBB SHADOW E&M-EST. PATIENT-LVL III: ICD-10-PCS | Mod: PBBFAC,HCNC,, | Performed by: ORTHOPAEDIC SURGERY

## 2019-05-08 PROCEDURE — 1101F PR PT FALLS ASSESS DOC 0-1 FALLS W/OUT INJ PAST YR: ICD-10-PCS | Mod: HCNC,CPTII,S$GLB, | Performed by: ORTHOPAEDIC SURGERY

## 2019-05-08 PROCEDURE — 1101F PT FALLS ASSESS-DOCD LE1/YR: CPT | Mod: HCNC,CPTII,S$GLB, | Performed by: ORTHOPAEDIC SURGERY

## 2019-05-08 NOTE — PROGRESS NOTES
Subjective:      Patient ID: Neptali Gonzalez is a 68 y.o. male.  Chief Complaint: Follow-up of the Right Hand      HPI  Neptali Gonzalez is a  68 y.o. male presenting today for post op visit.  He is s/p right shoulder arthroscopy several months postop actually was sort of lost to follow-up after December when he was here last  He says he never went to therapy but would like to restart therapy because of some stiffness in the shoulder pain is minimal.     Review of patient's allergies indicates:   Allergen Reactions    Percocet [oxycodone-acetaminophen] Other (See Comments)     AMS on narcotics         Current Outpatient Medications   Medication Sig Dispense Refill    acetaminophen (TYLENOL) 500 MG tablet Take 1 tablet (500 mg total) by mouth every 6 (six) hours as needed for Pain. 90 tablet 0    amLODIPine (NORVASC) 10 MG tablet TAKE 1 TABLET(10 MG) BY MOUTH EVERY DAY 90 tablet 3    aspirin (ECOTRIN) 81 MG EC tablet Take 81 mg by mouth once daily.      atorvastatin (LIPITOR) 40 MG tablet Take 1 tablet (40 mg total) by mouth once daily. 90 tablet 3    blood sugar diagnostic (ACCU-CHEK RODRIGO) Strp 1 strip by Misc.(Non-Drug; Combo Route) route once daily. 200 strip 3    flash glucose sensor Kit 1 application by Misc.(Non-Drug; Combo Route) route once daily. 1 kit 0    glipiZIDE (GLUCOTROL) 5 MG TR24 Take 1 tablet (5 mg total) by mouth daily with breakfast. 90 tablet 3    ketoconazole (NIZORAL) 2 % cream Apply topically once daily. toes 30 g 1    lancets Misc 1 lancet by Misc.(Non-Drug; Combo Route) route once daily at 6am. 200 each 3    lisinopril 10 MG tablet Take 1 tablet (10 mg total) by mouth once daily. 90 tablet 3    metFORMIN (GLUCOPHAGE-XR) 500 MG 24 hr tablet Take 2 tablets (1,000 mg total) by mouth daily with breakfast. 180 tablet 3    polyethylene glycol (GOLYTELY,NULYTELY) 236-22.74-6.74 -5.86 gram suspension As directed 4000 mL 0    TRUEPLUS LANCETS 33 gauge Misc        "blood-glucose meter kit Use as instructed 1 each 0     No current facility-administered medications for this visit.        Past Medical History:   Diagnosis Date    CKD (chronic kidney disease)     Coronary artery disease 2002    stent    Diabetes mellitus     Hypertension     Kidney stones     MI (myocardial infarction)     Stroke        Past Surgical History:   Procedure Laterality Date    ARTHROSCOPY WITH DISTAL CLAVICLE  EXCISION AND SUBACROMIAL  DECOMPRESSION Right 11/7/2018    [facility] OR    CARDIAC SURGERY      CORONARY STENT PLACEMENT      CYSTOSCOPY, WITH URETERAL STENT INSERTION N/A 2/28/2012    Performed by [facility] OR    illia stent 2007      INJECTION-STEROID-EPIDURAL-CERVICAL N/A 1/30/2017    [facility] OR    LITHOTRIPSY      LITHOTRIPSY-EXTRACORPOREAL SHOCK WAVE Right 10/29/2014    Performed by [facility] at [facility] OR    RELEASE-CARPAL TUNNEL Right 8/15/2016    Performed by Carlos Orlando [facility] at [facility] OR    TONSILLECTOMY         OBJECTIVE:   PHYSICAL EXAM:  Height: 5' 6" (167.6 cm) Weight: 72.1 kg (159 lb)  Vitals:    05/08/19 1401   Weight: 72.1 kg (159 lb)   Height: 5' 6" (1.676 m)   PainSc: 0-No pain   PainLoc: Hand     Ortho/SPM Exam  Examination right shoulder no tenderness no swelling range of motion is slightly decreased to the does have a little bit of stiffness limited elevation and external rotation  Strength is good    RADIOGRAPHS:  None  Comments: I have personally reviewed the imaging and I agree with the above radiologist's report.    ASSESSMENT/PLAN:     IMPRESSION:  Stiffness after previous shoulder arthroscopy    PLAN:  I have ordered therapy in [place] close to where he lives increase activities as tolerated Advil or Motrin by mouth    FOLLOW UP:  6-8 weeks    Disclaimer: This note has been generated using voice-recognition software. There may be typographical errors that " have been missed during proof-reading.

## 2019-05-21 DIAGNOSIS — E11.59 TYPE 2 DIABETES MELLITUS WITH OTHER CIRCULATORY COMPLICATION, UNSPECIFIED WHETHER LONG TERM INSULIN USE: Primary | ICD-10-CM

## 2019-06-19 ENCOUNTER — PATIENT MESSAGE (OUTPATIENT)
Dept: ORTHOPEDICS | Facility: CLINIC | Age: 69
End: 2019-06-19

## 2019-06-20 ENCOUNTER — TELEPHONE (OUTPATIENT)
Dept: ORTHOPEDICS | Facility: CLINIC | Age: 69
End: 2019-06-20

## 2019-06-20 NOTE — TELEPHONE ENCOUNTER
----- Message from Michael Reardon sent at 6/20/2019 10:43 AM CDT -----  Contact: ABBY MASON [[id number]]  Name of Who is Calling: ABBY MASON [[id number]]      What is the request in detail: Would like to speak with staff in regards to a hand therapy appointment. Please advise      Can the clinic reply by [facility]: no      What Number to Call Back if not in [facility]: [phone]

## 2019-07-10 ENCOUNTER — OFFICE VISIT (OUTPATIENT)
Dept: PRIMARY CARE CLINIC | Facility: CLINIC | Age: 69
End: 2019-07-10
Payer: MEDICARE

## 2019-07-10 ENCOUNTER — LAB VISIT (OUTPATIENT)
Dept: LAB | Facility: HOSPITAL | Age: 69
End: 2019-07-10
Attending: INTERNAL MEDICINE
Payer: MEDICARE

## 2019-07-10 VITALS
SYSTOLIC BLOOD PRESSURE: 124 MMHG | OXYGEN SATURATION: 99 % | HEART RATE: 62 BPM | WEIGHT: 162.13 LBS | HEIGHT: 66 IN | BODY MASS INDEX: 26.06 KG/M2 | DIASTOLIC BLOOD PRESSURE: 80 MMHG

## 2019-07-10 DIAGNOSIS — E11.8 TYPE 2 DIABETES MELLITUS WITH COMPLICATION, WITHOUT LONG-TERM CURRENT USE OF INSULIN: ICD-10-CM

## 2019-07-10 DIAGNOSIS — R20.0 NUMBNESS OF RIGHT HAND: ICD-10-CM

## 2019-07-10 DIAGNOSIS — E78.2 DYSLIPIDEMIA WITH LOW HIGH DENSITY LIPOPROTEIN (HDL) CHOLESTEROL WITH HYPERTRIGLYCERIDEMIA DUE TO TYPE 2 DIABETES MELLITUS: ICD-10-CM

## 2019-07-10 DIAGNOSIS — E11.69 DYSLIPIDEMIA WITH LOW HIGH DENSITY LIPOPROTEIN (HDL) CHOLESTEROL WITH HYPERTRIGLYCERIDEMIA DUE TO TYPE 2 DIABETES MELLITUS: ICD-10-CM

## 2019-07-10 DIAGNOSIS — E11.59 HYPERTENSION ASSOCIATED WITH DIABETES: ICD-10-CM

## 2019-07-10 DIAGNOSIS — I15.2 HYPERTENSION ASSOCIATED WITH DIABETES: ICD-10-CM

## 2019-07-10 DIAGNOSIS — Z91.199 NONCOMPLIANCE: ICD-10-CM

## 2019-07-10 DIAGNOSIS — Z12.11 COLON CANCER SCREENING: ICD-10-CM

## 2019-07-10 DIAGNOSIS — E55.9 HYPOVITAMINOSIS D: ICD-10-CM

## 2019-07-10 LAB
25(OH)D3+25(OH)D2 SERPL-MCNC: 12 NG/ML (ref 30–96)
ALBUMIN SERPL BCP-MCNC: 4 G/DL (ref 3.5–5.2)
ALP SERPL-CCNC: 99 U/L (ref 55–135)
ALT SERPL W/O P-5'-P-CCNC: 17 U/L (ref 10–44)
ANION GAP SERPL CALC-SCNC: 7 MMOL/L (ref 8–16)
AST SERPL-CCNC: 13 U/L (ref 10–40)
BASOPHILS # BLD AUTO: 0.03 K/UL (ref 0–0.2)
BASOPHILS NFR BLD: 0.4 % (ref 0–1.9)
BILIRUB SERPL-MCNC: 0.6 MG/DL (ref 0.1–1)
BUN SERPL-MCNC: 17 MG/DL (ref 8–23)
CALCIUM SERPL-MCNC: 9.8 MG/DL (ref 8.7–10.5)
CHLORIDE SERPL-SCNC: 101 MMOL/L (ref 95–110)
CHOLEST SERPL-MCNC: 227 MG/DL (ref 120–199)
CHOLEST/HDLC SERPL: 4.7 {RATIO} (ref 2–5)
CO2 SERPL-SCNC: 30 MMOL/L (ref 23–29)
CREAT SERPL-MCNC: 1.5 MG/DL (ref 0.5–1.4)
DIFFERENTIAL METHOD: ABNORMAL
EOSINOPHIL # BLD AUTO: 0.1 K/UL (ref 0–0.5)
EOSINOPHIL NFR BLD: 1.3 % (ref 0–8)
ERYTHROCYTE [DISTWIDTH] IN BLOOD BY AUTOMATED COUNT: 13.2 % (ref 11.5–14.5)
EST. GFR  (AFRICAN AMERICAN): 54 ML/MIN/1.73 M^2
EST. GFR  (NON AFRICAN AMERICAN): 47 ML/MIN/1.73 M^2
ESTIMATED AVG GLUCOSE: 226 MG/DL (ref 68–131)
GLUCOSE SERPL-MCNC: 217 MG/DL (ref 70–110)
HBA1C MFR BLD HPLC: 9.5 % (ref 4–5.6)
HCT VFR BLD AUTO: 47.8 % (ref 40–54)
HDLC SERPL-MCNC: 48 MG/DL (ref 40–75)
HDLC SERPL: 21.1 % (ref 20–50)
HGB BLD-MCNC: 16.3 G/DL (ref 14–18)
LDLC SERPL CALC-MCNC: 138.2 MG/DL (ref 63–159)
LYMPHOCYTES # BLD AUTO: 1.2 K/UL (ref 1–4.8)
LYMPHOCYTES NFR BLD: 16.8 % (ref 18–48)
MCH RBC QN AUTO: 30.7 PG (ref 27–31)
MCHC RBC AUTO-ENTMCNC: 34.1 G/DL (ref 32–36)
MCV RBC AUTO: 90 FL (ref 82–98)
MONOCYTES # BLD AUTO: 0.5 K/UL (ref 0.3–1)
MONOCYTES NFR BLD: 7 % (ref 4–15)
NEUTROPHILS # BLD AUTO: 5.3 K/UL (ref 1.8–7.7)
NEUTROPHILS NFR BLD: 74.5 % (ref 38–73)
NONHDLC SERPL-MCNC: 179 MG/DL
PLATELET # BLD AUTO: 197 K/UL (ref 150–350)
PMV BLD AUTO: 10.1 FL (ref 9.2–12.9)
POTASSIUM SERPL-SCNC: 4.6 MMOL/L (ref 3.5–5.1)
PROT SERPL-MCNC: 7.7 G/DL (ref 6–8.4)
RBC # BLD AUTO: 5.31 M/UL (ref 4.6–6.2)
SODIUM SERPL-SCNC: 138 MMOL/L (ref 136–145)
TRIGL SERPL-MCNC: 204 MG/DL (ref 30–150)
TSH SERPL DL<=0.005 MIU/L-ACNC: 0.8 UIU/ML (ref 0.4–4)
WBC # BLD AUTO: 7.16 K/UL (ref 3.9–12.7)

## 2019-07-10 PROCEDURE — 1101F PT FALLS ASSESS-DOCD LE1/YR: CPT | Mod: HCNC,CPTII,S$GLB, | Performed by: INTERNAL MEDICINE

## 2019-07-10 PROCEDURE — 3079F DIAST BP 80-89 MM HG: CPT | Mod: HCNC,CPTII,S$GLB, | Performed by: INTERNAL MEDICINE

## 2019-07-10 PROCEDURE — 3079F PR MOST RECENT DIASTOLIC BLOOD PRESSURE 80-89 MM HG: ICD-10-PCS | Mod: HCNC,CPTII,S$GLB, | Performed by: INTERNAL MEDICINE

## 2019-07-10 PROCEDURE — 1101F PR PT FALLS ASSESS DOC 0-1 FALLS W/OUT INJ PAST YR: ICD-10-PCS | Mod: HCNC,CPTII,S$GLB, | Performed by: INTERNAL MEDICINE

## 2019-07-10 PROCEDURE — 99215 OFFICE O/P EST HI 40 MIN: CPT | Mod: HCNC,S$GLB,, | Performed by: INTERNAL MEDICINE

## 2019-07-10 PROCEDURE — 36415 COLL VENOUS BLD VENIPUNCTURE: CPT | Mod: HCNC

## 2019-07-10 PROCEDURE — 80053 COMPREHEN METABOLIC PANEL: CPT | Mod: HCNC

## 2019-07-10 PROCEDURE — 84443 ASSAY THYROID STIM HORMONE: CPT | Mod: HCNC

## 2019-07-10 PROCEDURE — 80061 LIPID PANEL: CPT | Mod: HCNC

## 2019-07-10 PROCEDURE — 3074F PR MOST RECENT SYSTOLIC BLOOD PRESSURE < 130 MM HG: ICD-10-PCS | Mod: HCNC,CPTII,S$GLB, | Performed by: INTERNAL MEDICINE

## 2019-07-10 PROCEDURE — 85025 COMPLETE CBC W/AUTO DIFF WBC: CPT | Mod: HCNC

## 2019-07-10 PROCEDURE — 82306 VITAMIN D 25 HYDROXY: CPT | Mod: HCNC

## 2019-07-10 PROCEDURE — 3074F SYST BP LT 130 MM HG: CPT | Mod: HCNC,CPTII,S$GLB, | Performed by: INTERNAL MEDICINE

## 2019-07-10 PROCEDURE — 99215 PR OFFICE/OUTPT VISIT, EST, LEVL V, 40-54 MIN: ICD-10-PCS | Mod: HCNC,S$GLB,, | Performed by: INTERNAL MEDICINE

## 2019-07-10 PROCEDURE — 83036 HEMOGLOBIN GLYCOSYLATED A1C: CPT | Mod: HCNC

## 2019-07-10 RX ORDER — LISINOPRIL 10 MG/1
10 TABLET ORAL DAILY
Qty: 90 TABLET | Refills: 3 | Status: SHIPPED | OUTPATIENT
Start: 2019-07-10 | End: 2020-12-31 | Stop reason: SDUPTHER

## 2019-07-10 RX ORDER — AMLODIPINE BESYLATE 10 MG/1
TABLET ORAL
Qty: 90 TABLET | Refills: 3 | Status: SHIPPED | OUTPATIENT
Start: 2019-07-10 | End: 2019-08-30 | Stop reason: SDUPTHER

## 2019-07-10 RX ORDER — ATORVASTATIN CALCIUM 40 MG/1
40 TABLET, FILM COATED ORAL DAILY
Qty: 90 TABLET | Refills: 3 | Status: SHIPPED | OUTPATIENT
Start: 2019-07-10 | End: 2020-12-31 | Stop reason: SDUPTHER

## 2019-07-10 NOTE — PROGRESS NOTES
Primary Care Provider Appointment    Subjective:      Patient ID: Neptali Gonzalez is a 68 y.o. male with HLD, DM, HTN, hemiplegia    Chief Complaint: Follow-up and Diabetes    At last appointment patient needed 2 doses of narcan due to AMS from overuse of narcotics from dentist. Today he is mentating appropriately and off of narcotics.    Many of his meds are . Unclear whether he is compliant, he does not have pill bottles.    His A1c is well-controlled at 7.4 one year ago. He wants a glucose sensor to check his readings, due to his inability to use his R hand (from stroke) has no insurance coverage for a sensor glucometer. He does not have money to pay out of pocket.    He starts OT next month for hand weakness and shoulder pain. Followed by Dr Thurston. Underwent arthroscopy recently with pain following.    Has questionable compliance with meds due to financial strain due to losing his job.    Past Surgical History:   Procedure Laterality Date    ARTHROSCOPY WITH DISTAL CLAVICLE  EXCISION AND SUBACROMIAL  DECOMPRESSION Right 2018    [facility] OR    CARDIAC SURGERY      CORONARY STENT PLACEMENT      CYSTOSCOPY, WITH URETERAL STENT INSERTION N/A 2012    [facility]    [facility]      INJECTION-STEROID-EPIDURAL-CERVICAL N/A 2017    [facility] OR    LITHOTRIPSY      LITHOTRIPSY-EXTRACORPOREAL SHOCK WAVE Right 10/29/2014    [facility] at [facility] OR    RELEASE-CARPAL TUNNEL Right 8/15/2016    [facility] at [facility] OR    TONSILLECTOMY         Past Medical History:   Diagnosis Date    CKD (chronic kidney disease)     Coronary artery disease 2002    stent    Diabetes mellitus     Hypertension     Kidney stones     MI (myocardial infarction)     Stroke        Social History     Socioeconomic History    Marital status:      Spouse  "name: Not on file    Number of children: Not on file    Years of education: Not on file    Highest education level: Not on file   Occupational History    Not on file   Social Needs    Financial resource strain: Somewhat hard    Food insecurity:     Worry: Never true     Inability: Never true    Transportation needs:     Medical: No     Non-medical: No   Tobacco Use    Smoking status: Former Smoker     Packs/day: 1.00     Years: 3.00     Pack years: 3.00     Start date: 1/1/1967    Smokeless tobacco: Never Used   Substance and Sexual Activity    Alcohol use: No    Drug use: No    Sexual activity: Yes     Partners: Male     Birth control/protection: Condom   Lifestyle    Physical activity:     Days per week: Not on file     Minutes per session: Not on file    Stress: Not on file   Relationships    Social connections:     Talks on phone: Not on file     Gets together: Not on file     Attends Spiritism service: Not on file     Active member of club or organization: Not on file     Attends meetings of clubs or organizations: Not on file     Relationship status: Not on file   Other Topics Concern    Not on file   Social History Narrative    Not on file       Review of Systems   Constitutional: Negative for activity change, appetite change, fatigue and fever.   Respiratory: Negative for shortness of breath.    Cardiovascular: Negative for chest pain and leg swelling.   Musculoskeletal: Positive for arthralgias, back pain and joint swelling.   Skin: Positive for wound.   Neurological: Positive for tremors, weakness and numbness. Negative for dizziness, seizures and speech difficulty.   Hematological: Bruises/bleeds easily.   Psychiatric/Behavioral: Negative for confusion and decreased concentration. The patient is not nervous/anxious.        Objective:   /80   Pulse 62   Ht 5' 6" (1.676 m)   Wt 73.5 kg (162 lb 2.4 oz)   SpO2 99%   BMI 26.17 kg/m²     Physical Exam   Constitutional: He is oriented " to person, place, and time. He appears well-developed and well-nourished.   HENT:   Head: Normocephalic.   Eyes: EOM are normal.   Musculoskeletal: Normal range of motion.   Neurological: He is alert and oriented to person, place, and time.   Mild resting tremor in R hand (improved since last exam)  Abnormal mentation  Decreased short term memory   Skin: Skin is warm and dry.   Ecchymoses and purpura on UE bilaterally     Psychiatric: He has a normal mood and affect. His behavior is normal. Judgment and thought content normal.   Nursing note and vitals reviewed.      Lab Results   Component Value Date    WBC 7.97 2018    HGB 16.1 2018    HCT 45.5 2018     2018    CHOL 175 2018    TRIG 193 (H) 2018    HDL 41 2018    ALT 21 2018    AST 15 2018     2018    K 4.2 2018     2018    CREATININE 1.3 2018    BUN 14 2018    CO2 28 2018    INR 1.1 2016    HGBA1C 7.4 (H) 2018       RESULTS: Reviewed labs and images today    Assessment:   68 y.o. male with multiple co-morbid illnesses here to continue work-up of chronic issues notably with HLD, DM, HTN, hemiplegia.     Plan:     Problem List Items Addressed This Visit        Psychiatric    Noncompliance     Meds , needs assistance with managing meds  · Advised pill packing            Cardiac/Vascular    Dyslipidemia with low high density lipoprotein (HDL) cholesterol with hypertriglyceridemia due to type 2 diabetes mellitus     HDL 37, but ASCVD risk high, questionable compliance with atorvastatin 40mg  · Continue statin   · Continue metformin 500mg   · Planned to increase to BID pending kidney function and A1c  · Patient unable to be compliant with BID dosing  · Continue glipizide 5mg  · Is not eligible for the freestyle apolinar (verified with insurance company)  · Patient to purchase out of pocket         Relevant Medications    flash glucose sensor Kit     atorvastatin (LIPITOR) 40 MG tablet    Other Relevant Orders    Comprehensive metabolic panel    CBC auto differential    Lipid panel    TSH    Vitamin D    Ambulatory Referral to Optometry    Hypertension associated with diabetes     BP controlled on ACE, CCB  · Continue amlodipine 10mg, lisinopril 10mg  · Continue statin & ASA         Relevant Medications    flash glucose sensor Kit    lisinopril 10 MG tablet       Endocrine    Uncontrolled secondary diabetes mellitus with stage 3 CKD (GFR 30-59)     A1c 7.4  · Continue metformin 500mg XR and glipizide  · Monitor kidney function q3 mos  · Continue ACE         Relevant Medications    flash glucose sensor Kit    amLODIPine (NORVASC) 10 MG tablet    lisinopril 10 MG tablet    Hypovitaminosis D     Low Vit D in 2018  · Monitor  · supplement         Relevant Orders    Vitamin D       GI    Colon cancer screening     Previous colonoscopy >10 years ago, unknown results, resistant to rescheduling. Willing to perform iFOBT  · Colonoscopy ordered at previous appt  · Number given to schedule (again)  · iFOBT today         Relevant Orders    Fecal Immunochemical Test (iFOBT)       Other    Numbness of right hand     S/p R hand plegia and apraxia following stroke.  · Followed by Neuro  · Stable  · Continue statin, APT, BP control, DM control         Relevant Orders    TSH      Other Visit Diagnoses     Type 2 diabetes mellitus with complication, without long-term current use of insulin        Relevant Medications    flash glucose sensor Kit    amLODIPine (NORVASC) 10 MG tablet    Other Relevant Orders    Comprehensive metabolic panel    CBC auto differential    Hemoglobin A1c    POCT Glucose, Hand-Held Device    Ambulatory Referral to Optometry          Health Maintenance       Date Due Completion Date    TETANUS VACCINE 10/12/1967 ---    High Dose Statin 10/12/1970 ---TODAY    Shingles Vaccine (1 of 2) 10/12/1999 ---    Colonoscopy 10/12/1999 ---TODAY    Hemoglobin A1c  03/14/2019 9/14/2018- TODAY    Lipid Panel 06/25/2019 6/25/2018- TODAY    Eye Exam 07/23/2019 7/23/2018- TODAY    Override on 5/23/2017: Done (Eye Cam done)    Influenza Vaccine 08/01/2019 11/5/2018    Override on 10/6/2017: Done    Foot Exam 09/14/2019 9/14/2018 (Done)- NEXT    Override on 9/14/2018: Done          Follow up in about 2 months (around 9/10/2019). Total face-to-face time was 60 min, 50% of this was spent on counseling and coordination of care. The following issues were discussed: with HLD, DM, HTN, hemiplegia    Karne Chacon MD/MPH  Internal Medicine  [facility]  [phone]

## 2019-07-10 NOTE — ASSESSMENT & PLAN NOTE
HDL 37, but ASCVD risk high, questionable compliance with atorvastatin 40mg  · Continue statin   · Continue metformin 500mg   · Planned to increase to BID pending kidney function and A1c  · Patient unable to be compliant with BID dosing  · Continue glipizide 5mg  · Is not eligible for the freestyle apolinar (verified with insurance company)  · Patient to purchase out of pocket

## 2019-07-10 NOTE — ASSESSMENT & PLAN NOTE
A1c 7.4  · Continue metformin 500mg XR and glipizide  · Monitor kidney function q3 mos  · Continue ACE

## 2019-07-10 NOTE — ASSESSMENT & PLAN NOTE
Previous colonoscopy >10 years ago, unknown results, resistant to rescheduling. Willing to perform iFOBT  · Colonoscopy ordered at previous appt  · Number given to schedule (again)  · iFOBT today

## 2019-07-10 NOTE — PATIENT INSTRUCTIONS
TODAY:  - it will cost $100 for a glucose sensor, script printed.   + can fill it anywhere  - start pill packing   + meet with Nurse Gini next week  - Please call [phone] to schedule your colonoscopy  - try the stool card for colon cancer screening  - some meds sent to [facility]   + will wait for labs before diabetes meds are ordered  - labs today

## 2019-07-10 NOTE — ASSESSMENT & PLAN NOTE
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  · Followed by Neuro  · Stable  · Continue statin, APT, BP control, DM control

## 2019-07-10 NOTE — ASSESSMENT & PLAN NOTE
MRI 3/2017 showed partial thickness bursal surface tear of the supraspinatus tendon  · Surgical repair with Dr Thurston in 11/2018  · Had stiffness followign arthroscopy in 2019  · Continue OT  · Starts in 8/2019

## 2019-07-11 ENCOUNTER — TELEPHONE (OUTPATIENT)
Dept: INTERNAL MEDICINE | Facility: CLINIC | Age: 69
End: 2019-07-11

## 2019-07-11 DIAGNOSIS — E11.9 TYPE 2 DIABETES MELLITUS WITHOUT COMPLICATION: ICD-10-CM

## 2019-07-11 DIAGNOSIS — E78.2 DYSLIPIDEMIA WITH LOW HIGH DENSITY LIPOPROTEIN (HDL) CHOLESTEROL WITH HYPERTRIGLYCERIDEMIA DUE TO TYPE 2 DIABETES MELLITUS: ICD-10-CM

## 2019-07-11 DIAGNOSIS — E11.69 DYSLIPIDEMIA WITH LOW HIGH DENSITY LIPOPROTEIN (HDL) CHOLESTEROL WITH HYPERTRIGLYCERIDEMIA DUE TO TYPE 2 DIABETES MELLITUS: ICD-10-CM

## 2019-07-11 DIAGNOSIS — E11.8 TYPE 2 DIABETES MELLITUS WITH COMPLICATION, WITHOUT LONG-TERM CURRENT USE OF INSULIN: ICD-10-CM

## 2019-07-11 RX ORDER — GLIPIZIDE 10 MG/1
10 TABLET, FILM COATED, EXTENDED RELEASE ORAL
Qty: 90 TABLET | Refills: 3 | Status: SHIPPED | OUTPATIENT
Start: 2019-07-11 | End: 2019-12-09

## 2019-07-11 RX ORDER — METFORMIN HYDROCHLORIDE 500 MG/1
1000 TABLET, EXTENDED RELEASE ORAL
Qty: 180 TABLET | Refills: 3 | Status: SHIPPED | OUTPATIENT
Start: 2019-07-11 | End: 2019-12-09 | Stop reason: SDUPTHER

## 2019-07-11 NOTE — TELEPHONE ENCOUNTER
Per PCP request called to notify pt of the following.... his A1c was uncontrolled (9), chlolesterol elevated, kidney function abnormal.  PCP would like to increase the glipizide to 10mg daily. Call to let me know if you're ok with that, new script sent to his pharmacy.     PCP want you to do the pill packing program, and you will need to come in early next week to meet with nurse to organize the meds.  You are scheduled to come in on Friday @ 10 am.  PCP sent all of the refills to his [facility].

## 2019-07-18 ENCOUNTER — TELEPHONE (OUTPATIENT)
Dept: INTERNAL MEDICINE | Facility: CLINIC | Age: 69
End: 2019-07-18

## 2019-07-18 NOTE — TELEPHONE ENCOUNTER
LM for pt to return a call to us to reschedule his nurse visit, explained it's ok to reschedule the nurse visit with the phone staff for next week or when it's suitable for him.

## 2019-07-19 ENCOUNTER — TELEPHONE (OUTPATIENT)
Dept: INTERNAL MEDICINE | Facility: CLINIC | Age: 69
End: 2019-07-19

## 2019-07-19 NOTE — TELEPHONE ENCOUNTER
Pt came today with his medication bag. Listed below are the medication that he is currently taking.     Metformin ER 500mg 2 tabs daily with breakfast  Atorvastatin 40mg 1 tab daily  Amlodipine 10mg 1 tab daily  Lisinopril 10mg 1 tab daily  Glipizide ER 10mg 1 tab daily with breakfast

## 2019-07-22 NOTE — TELEPHONE ENCOUNTER
LM for pt to return a call to us and explained to him to bring his meds with him to the appointment when we schedule.

## 2019-08-15 ENCOUNTER — PATIENT OUTREACH (OUTPATIENT)
Dept: ADMINISTRATIVE | Facility: OTHER | Age: 69
End: 2019-08-15

## 2019-08-16 DIAGNOSIS — E11.9 TYPE 2 DIABETES MELLITUS WITHOUT COMPLICATION: ICD-10-CM

## 2019-08-19 ENCOUNTER — OFFICE VISIT (OUTPATIENT)
Dept: ORTHOPEDICS | Facility: CLINIC | Age: 69
End: 2019-08-19
Attending: ORTHOPAEDIC SURGERY
Payer: MEDICARE

## 2019-08-19 VITALS — HEIGHT: 66 IN | BODY MASS INDEX: 26.03 KG/M2 | WEIGHT: 162 LBS

## 2019-08-19 DIAGNOSIS — R20.0 NUMBNESS OF RIGHT HAND: Primary | ICD-10-CM

## 2019-08-19 PROCEDURE — 99213 PR OFFICE/OUTPT VISIT, EST, LEVL III, 20-29 MIN: ICD-10-PCS | Mod: HCNC,S$GLB,, | Performed by: ORTHOPAEDIC SURGERY

## 2019-08-19 PROCEDURE — 1101F PR PT FALLS ASSESS DOC 0-1 FALLS W/OUT INJ PAST YR: ICD-10-PCS | Mod: HCNC,CPTII,S$GLB, | Performed by: ORTHOPAEDIC SURGERY

## 2019-08-19 PROCEDURE — 99213 OFFICE O/P EST LOW 20 MIN: CPT | Mod: HCNC,S$GLB,, | Performed by: ORTHOPAEDIC SURGERY

## 2019-08-19 PROCEDURE — 99999 PR PBB SHADOW E&M-EST. PATIENT-LVL III: CPT | Mod: PBBFAC,HCNC,, | Performed by: ORTHOPAEDIC SURGERY

## 2019-08-19 PROCEDURE — 99999 PR PBB SHADOW E&M-EST. PATIENT-LVL III: ICD-10-PCS | Mod: PBBFAC,HCNC,, | Performed by: ORTHOPAEDIC SURGERY

## 2019-08-19 PROCEDURE — 1101F PT FALLS ASSESS-DOCD LE1/YR: CPT | Mod: HCNC,CPTII,S$GLB, | Performed by: ORTHOPAEDIC SURGERY

## 2019-08-19 NOTE — PROGRESS NOTES
Subjective:      Patient ID: Neptali Gonzalez is a 68 y.o. male.  Chief Complaint: Pain and Follow-up of the Right Hand and Pain and Follow-up of the Right Shoulder      HPI  Neptali Gonzalez is a  68 y.o. male presenting today for follow up of right shoulder and arm symptoms.  He reports that he is still having stiffness and pain in the right shoulder although the pain is minimal  He is also having continued numbness in the right hand  Apparently he had carpal tunnel surgery several years ago which failed to relieve his symptoms.    Review of patient's allergies indicates:   Allergen Reactions    Percocet [oxycodone-acetaminophen] Other (See Comments)     AMS on narcotics         Current Outpatient Medications   Medication Sig Dispense Refill    acetaminophen (TYLENOL) 500 MG tablet Take 1 tablet (500 mg total) by mouth every 6 (six) hours as needed for Pain. 90 tablet 0    amLODIPine (NORVASC) 10 MG tablet TAKE 1 TABLET(10 MG) BY MOUTH EVERY DAY 90 tablet 3    aspirin (ECOTRIN) 81 MG EC tablet Take 81 mg by mouth once daily.      atorvastatin (LIPITOR) 40 MG tablet Take 1 tablet (40 mg total) by mouth once daily. 90 tablet 3    blood sugar diagnostic (ACCU-CHEK RODRIGO) Strp 1 strip by Misc.(Non-Drug; Combo Route) route once daily. 200 strip 3    flash glucose sensor Kit 1 application by Misc.(Non-Drug; Combo Route) route once daily. 1 kit 0    glipiZIDE (GLUCOTROL) 10 MG TR24 Take 1 tablet (10 mg total) by mouth daily with breakfast. 90 tablet 3    ketoconazole (NIZORAL) 2 % cream Apply topically once daily. toes 30 g 1    lancets Misc 1 lancet by Misc.(Non-Drug; Combo Route) route once daily at 6am. 200 each 3    lisinopril 10 MG tablet Take 1 tablet (10 mg total) by mouth once daily. 90 tablet 3    metFORMIN (GLUCOPHAGE-XR) 500 MG 24 hr tablet Take 2 tablets (1,000 mg total) by mouth daily with breakfast. 180 tablet 3    polyethylene glycol (GOLYTELY,NULYTELY) 236-22.74-6.74 -5.86 gram  "suspension As directed 4000 mL 0     No current facility-administered medications for this visit.        Past Medical History:   Diagnosis Date    CKD (chronic kidney disease)     Coronary artery disease 2002    stent    Diabetes mellitus     Hypertension     Kidney stones     MI (myocardial infarction)     Stroke        Past Surgical History:   Procedure Laterality Date    ARTHROSCOPY WITH DISTAL CLAVICLE  EXCISION AND SUBACROMIAL  DECOMPRESSION Right 11/7/2018    [facility] OR    CARDIAC SURGERY      CORONARY STENT PLACEMENT      CYSTOSCOPY, WITH URETERAL STENT INSERTION N/A 2/28/2012    [facility] OR    illia stent 2007      INJECTION-STEROID-EPIDURAL-CERVICAL N/A 1/30/2017    [facility]    LITHOTRIPSY      LITHOTRIPSY-EXTRACORPOREAL SHOCK WAVE Right 10/29/2014    [facility] OR    RELEASE-CARPAL TUNNEL Right 8/15/2016    [facility] Carlos [facility] OR    TONSILLECTOMY         OBJECTIVE:   PHYSICAL EXAM:  Height: 5' 6" (167.6 cm) Weight: 73.5 kg (162 lb)  Vitals:    08/19/19 0856   Weight: 73.5 kg (162 lb)   Height: 5' 6" (1.676 m)   PainSc: 0-No pain     Ortho/SPM Exam  Examination right shoulder no tenderness no swelling  Range of motion is limited particularly elevation to about 80 and maybe forward elevation to about 90 with some pain mildly positive impingement sign  External rotation about 40°  Examination right hand demonstrates mildly positive Tinel sign at the wrist range of motion fingers full  strength decreased    RADIOGRAPHS:  None  Comments: I have personally reviewed the imaging and I agree with the above radiologist's report.    ASSESSMENT/PLAN:     IMPRESSION:  1.  Right shoulder stiffness after previous subacromial decompression.  2.  Possible residual right carpal tunnel syndrome versus neuropathy    PLAN:  For the right hand I have " ordered a nerve conduction study  I have also reordered therapy for the right shoulder for range of motion and stretching      FOLLOW UP:  After the nerve test is complete    Disclaimer: This note has been generated using voice-recognition software. There may be typographical errors that have been missed during proof-reading.

## 2019-08-26 ENCOUNTER — OFFICE VISIT (OUTPATIENT)
Dept: PRIMARY CARE CLINIC | Facility: CLINIC | Age: 69
End: 2019-08-26
Payer: MEDICARE

## 2019-08-26 VITALS
OXYGEN SATURATION: 97 % | HEIGHT: 66 IN | WEIGHT: 164.25 LBS | DIASTOLIC BLOOD PRESSURE: 80 MMHG | BODY MASS INDEX: 26.4 KG/M2 | SYSTOLIC BLOOD PRESSURE: 120 MMHG | HEART RATE: 66 BPM

## 2019-08-26 DIAGNOSIS — E11.8 TYPE 2 DIABETES MELLITUS WITH COMPLICATION, WITHOUT LONG-TERM CURRENT USE OF INSULIN: ICD-10-CM

## 2019-08-26 DIAGNOSIS — E55.9 HYPOVITAMINOSIS D: ICD-10-CM

## 2019-08-26 PROCEDURE — 99215 PR OFFICE/OUTPT VISIT, EST, LEVL V, 40-54 MIN: ICD-10-PCS | Mod: HCNC,S$GLB,, | Performed by: INTERNAL MEDICINE

## 2019-08-26 PROCEDURE — 3079F DIAST BP 80-89 MM HG: CPT | Mod: HCNC,CPTII,S$GLB, | Performed by: INTERNAL MEDICINE

## 2019-08-26 PROCEDURE — 3074F PR MOST RECENT SYSTOLIC BLOOD PRESSURE < 130 MM HG: ICD-10-PCS | Mod: HCNC,CPTII,S$GLB, | Performed by: INTERNAL MEDICINE

## 2019-08-26 PROCEDURE — 1101F PT FALLS ASSESS-DOCD LE1/YR: CPT | Mod: HCNC,CPTII,S$GLB, | Performed by: INTERNAL MEDICINE

## 2019-08-26 PROCEDURE — 3074F SYST BP LT 130 MM HG: CPT | Mod: HCNC,CPTII,S$GLB, | Performed by: INTERNAL MEDICINE

## 2019-08-26 PROCEDURE — 3046F PR MOST RECENT HEMOGLOBIN A1C LEVEL > 9.0%: ICD-10-PCS | Mod: HCNC,CPTII,S$GLB, | Performed by: INTERNAL MEDICINE

## 2019-08-26 PROCEDURE — 3046F HEMOGLOBIN A1C LEVEL >9.0%: CPT | Mod: HCNC,CPTII,S$GLB, | Performed by: INTERNAL MEDICINE

## 2019-08-26 PROCEDURE — 99215 OFFICE O/P EST HI 40 MIN: CPT | Mod: HCNC,S$GLB,, | Performed by: INTERNAL MEDICINE

## 2019-08-26 PROCEDURE — 3079F PR MOST RECENT DIASTOLIC BLOOD PRESSURE 80-89 MM HG: ICD-10-PCS | Mod: HCNC,CPTII,S$GLB, | Performed by: INTERNAL MEDICINE

## 2019-08-26 PROCEDURE — 1101F PR PT FALLS ASSESS DOC 0-1 FALLS W/OUT INJ PAST YR: ICD-10-PCS | Mod: HCNC,CPTII,S$GLB, | Performed by: INTERNAL MEDICINE

## 2019-08-26 RX ORDER — AMLODIPINE BESYLATE 10 MG/1
TABLET ORAL
Qty: 90 TABLET | Refills: 3 | Status: CANCELLED | OUTPATIENT
Start: 2019-08-26

## 2019-08-26 RX ORDER — ACETAMINOPHEN 500 MG
5000 TABLET ORAL DAILY
Qty: 90 TABLET | Refills: 3 | Status: SHIPPED | OUTPATIENT
Start: 2019-08-26 | End: 2019-12-09 | Stop reason: SDUPTHER

## 2019-08-26 NOTE — ASSESSMENT & PLAN NOTE
A1c trending up  · Continue metformin 500mg XR and glipizide  · Monitor kidney function q3 mos  · Continue ACE  · Pill packing to improve compliance

## 2019-08-26 NOTE — PATIENT INSTRUCTIONS
TODAY:  - glucose check  - PCP to help with scheduling nerve conduction study  - pill packs today  - cook more meals at home  - case management to help with meals

## 2019-08-26 NOTE — TELEPHONE ENCOUNTER
----- Message from Karen Chacon MD sent at 8/26/2019 12:20 PM CDT -----  Can you assist with scheduling patient's nerve conduction study? He saw Dr Thurston 8/19 but hasn't heard back about scheduling.    Thanks,  KJ

## 2019-08-26 NOTE — ASSESSMENT & PLAN NOTE
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  · Previously followed by Neuro  · Stable  · Continue statin, APT, BP control, DM control  · Appt with Dr Thurston  · Need nerve conduction study  · Will assist with scheduling today

## 2019-08-26 NOTE — PROGRESS NOTES
Primary Care Provider Appointment    Subjective:      Patient ID: Neptali Gonzalez is a 68 y.o. male with uncontrolled DM, HLD, HTN, hemiplegia    Chief Complaint: Follow-up and Hand Pain (right )    Patient did not participate in pill packing. He showed up on the wrong day for packing. Today fDictation #1  MRN:[id number]  CSN:[id number]  orgot to bring his meds.    His last A1c was 9.5. Glipizide was added to diabetes regimen one month ago. He hasn't been checking glucose due to dexterity.    Seen by hand surgery, will have nerve conduction study, but this hasn't been scheduled. Needs assistance.    Patient is requesting that we call his OT number for where I can send information to get his account settled.     Past Surgical History:   Procedure Laterality Date    ARTHROSCOPY WITH DISTAL CLAVICLE  EXCISION AND SUBACROMIAL  DECOMPRESSION Right 11/7/2018    [facility] OR    CARDIAC SURGERY      CORONARY STENT PLACEMENT      CYSTOSCOPY, WITH URETERAL STENT INSERTION N/A 2/28/2012    [facility]    [facility]      INJECTION-STEROID-EPIDURAL-CERVICAL N/A 1/30/2017    [facility] OR    LITHOTRIPSY      LITHOTRIPSY-EXTRACORPOREAL SHOCK WAVE Right 10/29/2014    [facility] OR    RELEASE-CARPAL TUNNEL Right 8/15/2016    [facility]formed [facility] at [facility] OR    TONSILLECTOMY         Past Medical History:   Diagnosis Date    CKD (chronic kidney disease)     Coronary artery disease 2002    stent    Diabetes mellitus     Hypertension     Kidney stones     MI (myocardial infarction)     Stroke        Social History     Socioeconomic History    Marital status:      Spouse name: Not on file    Number of children: Not on file    Years of education: Not on file    Highest education level: Not on file   Occupational History    Not on file   Social Needs    Financial  "resource strain: Somewhat hard    Food insecurity:     Worry: Never true     Inability: Never true    Transportation needs:     Medical: No     Non-medical: No   Tobacco Use    Smoking status: Former Smoker     Packs/day: 1.00     Years: 3.00     Pack years: 3.00     Start date: 1/1/1967    Smokeless tobacco: Never Used   Substance and Sexual Activity    Alcohol use: No    Drug use: No    Sexual activity: Yes     Partners: Male     Birth control/protection: Condom   Lifestyle    Physical activity:     Days per week: Not on file     Minutes per session: Not on file    Stress: Not on file   Relationships    Social connections:     Talks on phone: Not on file     Gets together: Not on file     Attends Worship service: Not on file     Active member of club or organization: Not on file     Attends meetings of clubs or organizations: Not on file     Relationship status: Not on file   Other Topics Concern    Not on file   Social History Narrative    Not on file       Review of Systems   Constitutional: Negative for activity change, appetite change, fatigue and fever.   Respiratory: Negative for shortness of breath.    Cardiovascular: Negative for chest pain and leg swelling.   Musculoskeletal: Positive for arthralgias, back pain and joint swelling.   Skin: Positive for wound.   Neurological: Positive for tremors, weakness and numbness. Negative for dizziness, seizures and speech difficulty.   Hematological: Bruises/bleeds easily.   Psychiatric/Behavioral: Negative for confusion and decreased concentration. The patient is not nervous/anxious.        Objective:   /80   Pulse 66   Ht 5' 6" (1.676 m)   Wt 74.5 kg (164 lb 3.9 oz)   SpO2 97%   BMI 26.51 kg/m²     Physical Exam   Constitutional: He is oriented to person, place, and time. He appears well-developed and well-nourished.   HENT:   Head: Normocephalic.   Eyes: EOM are normal.   Musculoskeletal: Normal range of motion.   Neurological: He is " alert and oriented to person, place, and time.   Mild resting tremor in R hand (improved since last exam)  Decreased short term memory   Skin: Skin is warm and dry.   Ecchymoses and purpura on UE bilaterally     Psychiatric: He has a normal mood and affect. His behavior is normal. Judgment and thought content normal.   Nursing note and vitals reviewed.      Lab Results   Component Value Date    WBC 7.16 07/10/2019    HGB 16.3 07/10/2019    HCT 47.8 07/10/2019     07/10/2019    CHOL 227 (H) 07/10/2019    TRIG 204 (H) 07/10/2019    HDL 48 07/10/2019    ALT 17 07/10/2019    AST 13 07/10/2019     07/10/2019    K 4.6 07/10/2019     07/10/2019    CREATININE 1.5 (H) 07/10/2019    BUN 17 07/10/2019    CO2 30 (H) 07/10/2019    TSH 0.803 07/10/2019    INR 1.1 05/25/2016    HGBA1C 9.5 (H) 07/10/2019       RESULTS: Reviewed labs and images today    Assessment:   68 y.o. male with multiple co-morbid illnesses here to continue work-up of chronic issues notably with uncontrolled DM, HLD, HTN, hemiplegia     Plan:     Problem List Items Addressed This Visit        Endocrine    Uncontrolled secondary diabetes mellitus with stage 3 CKD (GFR 30-59) - Primary     A1c trending up  · Continue metformin 500mg XR and glipizide  · Monitor kidney function q3 mos  · Continue ACE  · Pill packing to improve compliance         Relevant Medications    amLODIPine (NORVASC) 10 MG tablet    Other Relevant Orders    POCT Glucose, Hand-Held Device    Ambulatory referral to Outpatient Case Management    Ambulatory Referral to Podiatry    Hypovitaminosis D     Low Vit D in 2018 and 2019  · Monitor  · supplement         Relevant Medications    cholecalciferol, vitamin D3, (VITAMIN D3) 5,000 unit Tab    Other Relevant Orders    Ambulatory referral to Outpatient Case Management      Other Visit Diagnoses     Type 2 diabetes mellitus with complication, without long-term current use of insulin        Relevant Medications    amLODIPine  (NORVASC) 10 MG tablet          Health Maintenance       Date Due Completion Date    TETANUS VACCINE 10/12/1967 ---    Shingles Vaccine (1 of 2) 10/12/1999 ---    Colonoscopy 10/12/1999 ---    Foot Exam 09/14/2019 9/14/2018 (Done)- TODAY    Override on 9/14/2018: Done    Eye Exam 08/26/2019 (Originally 7/23/2019) 7/23/2018- TODAY    Override on 5/23/2017: Done (Eye Cam done)    Influenza Vaccine (1) 09/01/2019 11/5/2018    Override on 10/6/2017: Done    Hemoglobin A1c 01/10/2020 7/10/2019    Lipid Panel 07/10/2020 7/10/2019    High Dose Statin 08/26/2020 8/26/2019          Follow up in about 2 months (around 10/26/2019). Total face-to-face time was 60 min, 50% of this was spent on counseling and coordination of care. The following issues were discussed: with uncontrolled DM, HLD, HTN, hemiplegia    Karen Chacon MD/MPH  Internal Medicine  [facility]  [phone]

## 2019-08-26 NOTE — TELEPHONE ENCOUNTER
Left message regarding NCS will resend information to [facility] medical and they will reach out to patient for appointment.

## 2019-08-26 NOTE — Clinical Note
Can you assist with scheduling patient's nerve conduction study? He saw Dr Thurston 8/19 but hasn't heard back about scheduling.Thanks,KJ

## 2019-08-27 NOTE — PROGRESS NOTES
Adherence packed for 84 days:    Amlodipine 40 mg  Atorvastatin 40 mg  Glipizide ER 10 mg  Lisinopril 10 mg  Metformin  mg  Vitamin D3 5000 U

## 2019-08-29 ENCOUNTER — OUTPATIENT CASE MANAGEMENT (OUTPATIENT)
Dept: ADMINISTRATIVE | Facility: OTHER | Age: 69
End: 2019-08-29

## 2019-08-29 ENCOUNTER — PATIENT MESSAGE (OUTPATIENT)
Dept: ADMINISTRATIVE | Facility: OTHER | Age: 69
End: 2019-08-29

## 2019-08-29 NOTE — LETTER
August 29, 2019           Dear Mr. Gonzalez,     Welcome to [facility]s Diabetes Care Management Program!  My name is Gi Snowden RN and I will be your diabetes care manager.  As we discussed today, I work in partnership with your primary care provider to support and offer you tools and resources to help you achieve your health goals with confidence.     [facility] has worked with your [employer] Plan to develop a program based on recommendations from the American Diabetes Association and the [employer]s program called Together 2 Goal.     Some of the services we provide include:    Diabetes care tips and resources    Personal Diabetes Care Plan created with your primary care team    Nutrition counseling including meal planning and preparation tips    Personal goal setting and evaluation of goal progress    Access to diabetes support groups    Referral for diabetes retinal eye exam    Referral to a Diabetes Specialty Clinic (if needed)    24/7 assistance from [facility]      All services provided by this program are coordinated and communicated to your primary care team.     You will be receiving more information about these services in your [facility] account, by phone or through the mail. You may also visit our website [email or web address].     If you do not wish to participate or receive information about these services, please contact our office at [phone] or contact me directly through the patient portal or directly at [phone].      I look forward to our partnership!  We will work as a team to build or enhance your skills for managing your diabetes.       Sincerely,        Gi Snowden RN  Diabetes Care Management Team

## 2019-08-29 NOTE — LETTER
August 29, 2019    Neptali Gonzalez  [address] [place] [address]             [facility] [address] [place] [address] Dear Mr. Gonzalez,    I work with [facility] Outpatient Case Management Department. We received a referral to call you to discuss your medical history.These services are free of charge and are offered to [facility] patients who have recently been discharged from any of our facilities or who have complex medical conditions that may require the skill of a nurse to assist with management.         .      I attempted to reach you by telephone, but I was unsuccessful. Please call our department so that we can go over some questions with you regarding your health.    The Outpatient Case Management Department can be reached at [phone] from 8:00AM to 4:30 PM on Monday thru Friday. [facility] also has a program where a nurse is available 24/7 to answer questions or provide medical advice, their number is [phone].    Thanks,        Gi Snowden RN  Outpatient Care Management  [phone]

## 2019-08-29 NOTE — PROGRESS NOTES
"8/29/19  Summary:  Attempted to complete initial assessment for Outpatient Care Management    Interventions:  Left message requesting a return call, letter mailed (and sent via pt portal if pt is active portal) requesting a return call.      Plan:  Scheduled pt for call back. Gi Snowden RN    8/29/19  Incoming call received from Pt. Initial and RN assessments completed with pt. Pt is a 68 yr old female with PMH of Apraxia, IDDM Type 2, HTN, history of CVA, right sided hemiparesis and CAD. Pt reports he works "sometimes" and lives alone. Pt reports he drives himself to his appointments. Pt reports he does not check his BS because he does not have a meter. Pt reports his PCP- Dr BARTON is working on getting him a apolinar freestyle CGM to wear. Pt reports he has his BS checked at his last PCP appointment and it was 145. Pt reports he does not know what the recommended BS range is. Pt denies signs and symptoms of Hypoglycemia or Hyperglycemia. Discussed recommended BS range between . Discussed the importance of pt taking her BS 3-4 times a day and logging her results. Discussed pt's last  A1c of 9.5 and goal of <7.0. Discussed pt being at a higher risk to develop complications with elevated BS and A1c. Discussed results of A1c can assist the health care provider with developing a more accurate treatment plan. Pt reports he has been to Diabetic Education once. Medication reconciliation completed, discrepancies noted and Pt is not clear on his medications. Pt had his medication bottles with him as we did medication reconciliation and when asked specifically about certain meds and I spelled them to him pt reported he was not on that medication. Pt then said I forgot certain meds which turned out to be the same ones I had just asked him about. Pt is not clear on the names, dosages and purposes for all of his medications. Pt reports he will be starting pill packaging when he goes to his next appointment [facility] he " will be bringing his medications to the pharmacy to have them packaged. Pt reports he has trouble affording his medications and his PCP is assisting him with that. Pt does not recall speaking to anyone with PAP regarding assistance. In basket sent to PCP regarding medication discrepancies and clarification regarding Pt needing referral to PAP. Pt scored a 0 on PHQ-2. Pt reports he does not need information on Advance directives. Pt reports he was to be contacted regarding outpatient PT for his right hand but has not been called yet. This nurse will inquire as to referral to PT for pt. Pt reports he has trouble affording food each month, discussed with PCP at last visit. Referred to [facility]  regarding food resources and [employer] program. Will follow up with pt in 1 week, Pt in agreement.      Interventions:  In basket PCP regarding referral to outpatient PT, if PAP referral is needed and medication reconciliation.   In basket LCSW regarding food resources for pt.   Mail educational materials on Diabetes   Assess for retention of the signs and symptoms of disease specific exacerbation.  Assess patient's ability to perform ADLs.  Educate regarding Physician's recommended Blood Sugar range.  Empower patient/caregiver to discuss treatment plan with Physician/care team.  Encourage compliance with Physician follow-ups.  Assess for availability of working glucometer in home setting.  Collaborate with Physician as appropriate to meet patient needs.  Mail Blood glucose logs for home use.  Recognize and provide educational material ([employer]AL[employer])    Plan:  Diabetes: Follow up pt received educational materials, continue education  Facilitate referral to Diabetic class.  In basket Alba Man regarding scheduling diabetic education.   Encourage Medication Compliance.  Review eating/nutrition habits.  Encourage Dietary Compliance.  Encourage Exercise.  Review: Healthy Carbohydrates  · Whole grain breads and  cereal  · Brown rice   · Whole-wheat pasta  · Fruits and vegetables  · Dry beans    Review: Healthy Proteins  · Fish  · Plant proteins - dry beans, peas, nuts, and soy  · Lean meat with all visible fat removed  · Poultry with skin removed  · Low fat or nonfat milk, cheese, and yogurt    Review: Unhealthy Fats and Sugars  · Butter  · Luncheon meat  · Candy bars  · Regular sodas  · Gonzalez  Review: Signs and symptoms of Hyperglycemia/Hypoglycemia  · Extreme thirst  · Frequent urination  · Feeling tired  · Dizziness  · Blurry vision  · Shakiness or dizziness  · Confusion or irritability  · Cold, clammy skin or sweating  · Feelings of hunger     Review: If HbA1c results are high, the patient is at higher risk to develop complications.    Todays OPCM Self-Management Care Plan was developed with the patients/caregivers input and was based on identified barriers from todays assessment.  Goals were written today with the patient/caregiver and the patient has agreed to work towards these goals to improve his/her overall well-being. Patient verbalized understanding of the care plan, goals, and all of today's instructions. Encouraged patient/caregiver to communicate with his/her physician and health care team about health conditions and the treatment plan.  Provided my contact information today and encouraged patient/caregiver to call me with any questions as needed.

## 2019-08-30 ENCOUNTER — TELEPHONE (OUTPATIENT)
Dept: PRIMARY CARE CLINIC | Facility: CLINIC | Age: 69
End: 2019-08-30

## 2019-08-30 RX ORDER — AMLODIPINE BESYLATE 10 MG/1
TABLET ORAL
Qty: 90 TABLET | Refills: 3 | Status: SHIPPED | OUTPATIENT
Start: 2019-08-30 | End: 2019-12-09

## 2019-08-30 NOTE — TELEPHONE ENCOUNTER
----- Message from Gi Snowden RN sent at 8/30/2019  3:04 PM CDT -----  Dr. BARTON,     I also referred the pt to my LCSW-Vaishali.  He did not verbalize any opposition to taking the ASA just simply said he was not taking it. He seemed very confused to me and told me his meds were not being packed yet and I could hear his pill bottles as I did the med rec. I did say he was taking the Vitamin D and I do not think he would be opposed to going to see neuro psych. I think that is a good idea, something is definitely going on. I will ask him to start taking the ASA 81 mg daily.    Gi   ----- Message -----  From: Karen Chacon MD  Sent: 8/30/2019  12:17 PM  To: Gi Snowden RN, NELSON Barnett,  He is not eligible for insurance coverage of CGM because he is not on insulin and does not need to check his glucose TID. He will have to pay out of pocket for it and he doesn't have that money.    Please get him involved in any patient assistance available, he lost his job recently.    He seems to be having worsening dementia. At each appointment he is more confused. Is he willing to see neuropsych?    Regarding meds, he should be on the ASA daily. Otherwise his pills are packed as follows, so he is taking the Vit D. I will ask pharmacy to add the ASA to the next one, but could you ask him to take a daily ASA until then? Is he refusing to take ASA?    Adherence packed for 84 days:     Amlodipine 40 mg  Atorvastatin 40 mg  Glipizide ER 10 mg  Lisinopril 10 mg  Metformin  mg  Vitamin D3 5000 U    Electronically signed by Nichole Singh at 8/27/2019  1:37 PM       ----- Message -----  From: Magdalena Stephens MA  Sent: 8/30/2019  10:30 AM  To: Karen Chacon MD        ----- Message -----  From: Gi Snowden RN  Sent: 8/30/2019   9:55 AM  To: Oswaldo Lindsay [facility], I am Gi Isi, RN with the Outpatient Care Management/Disease Management team here at [facility]. I received  a referral on the above patient and enrolled him yesterday. Pt has apraxia and there was some times throughout the assessments that I had to repeat my questions or wait for him to look up information on his phone to give me answers.    Medication reconciliation completed, discrepancies noted and Pt is not clear on his medications. Pt had his medication bottles with him as we did medication reconciliation and when asked specifically about certain meds and I spelled them to him pt reported he was not on that medication. Pt then said that I have not listed certain meds which turned out to be the same ones I had just asked him about. Pt is not clear on the names, dosages and purposes for all of his medications, which I will work with him on. Pt reports he will be starting pill packaging when he goes to his next appointment at [facility]. Pt stated he will bring his medications to the pharmacy to have them packaged. Pt reports he has trouble affording his medications and you are assisting him with that. Pt does not recall speaking to anyone with PAP regarding assistance.    Pt stated he was not taking:  Aspirin 81mg daily  Vitamin D 5000 units daily  Nizoral 2% cream to toes daily  Polyethylene glycol suspension as needed    I just wanted to clarity some things with you regarding his care and medications:     Has he been referred to PAP for any type of medication cost assistance?  Pt said during assessment that he is not checking his BS and you were getting him a CBPIERCE, Chidi Freestyle. Can you let me know how that is going and if you need assistance with that?  Usually pts have to have a months worth of logs for ins to cover that monitor and also have a trial with it for a week or two.      Thank you for your time and assistance to this matter.     Kindest regards,     Gi Snowden RN   Outpatient Case Management/Disease Management

## 2019-08-30 NOTE — TELEPHONE ENCOUNTER
----- Message from Magdalena Stephens MA sent at 8/30/2019 10:30 AM CDT -----      ----- Message -----  From: Gi Snowden RN  Sent: 8/30/2019   9:55 AM  To: Carmenjose Andersonhy [facility], I am Gi Snowden RN with the Outpatient Care Management/Disease Management team here at [facility]. I received a referral on the above patient and enrolled him yesterday. Pt has apraxia and there was some times throughout the assessments that I had to repeat my questions or wait for him to look up information on his phone to give me answers.    Medication reconciliation completed, discrepancies noted and Pt is not clear on his medications. Pt had his medication bottles with him as we did medication reconciliation and when asked specifically about certain meds and I spelled them to him pt reported he was not on that medication. Pt then said that I have not listed certain meds which turned out to be the same ones I had just asked him about. Pt is not clear on the names, dosages and purposes for all of his medications, which I will work with him on. Pt reports he will be starting pill packaging when he goes to his next appointment at [facility]. Pt stated he will bring his medications to the pharmacy to have them packaged. Pt reports he has trouble affording his medications and you are assisting him with that. Pt does not recall speaking to anyone with PAP regarding assistance.    Pt stated he was not taking:  Aspirin 81mg daily  Vitamin D 5000 units daily  Nizoral 2% cream to toes daily  Polyethylene glycol suspension as needed    I just wanted to clarity some things with you regarding his care and medications:     Has he been referred to PAP for any type of medication cost assistance?  Pt said during assessment that he is not checking his BS and you were getting him a CHRISTOPHER, Chidi Freestyle. Can you let me know how that is going and if you need assistance with that?  Usually pts have to have a months worth of logs for ins to  cover that monitor and also have a trial with it for a week or two.      Thank you for your time and assistance to this matter.     Kindest regards,     Gi Snowden RN   Outpatient Case Management/Disease Management

## 2019-09-03 ENCOUNTER — OUTPATIENT CASE MANAGEMENT (OUTPATIENT)
Dept: ADMINISTRATIVE | Facility: OTHER | Age: 69
End: 2019-09-03

## 2019-09-03 ENCOUNTER — TELEPHONE (OUTPATIENT)
Dept: PRIMARY CARE CLINIC | Facility: CLINIC | Age: 69
End: 2019-09-03

## 2019-09-03 NOTE — TELEPHONE ENCOUNTER
----- Message from Gi Snowden RN sent at 9/3/2019  9:32 AM CDT -----  I will be following up with him and will let you know what he says about neuropsych testing, restarting ASA and picking up pill packs.       Gi   ----- Message -----  From: Karen Chacon MD  Sent: 8/30/2019   4:16 PM  To: Gi Snowden RN, #    Pills were packed on 8/27 for 90d, he needs to pick them up.     We can pack an ASA in the next one, so he should take it on his own for now.    Please see if he's willing to do neuropsych testing.    MICK  ----- Message -----  From: Gi Snowden RN  Sent: 8/30/2019   3:04 PM  To: Karen Chacon MD, #    Dr. KJ,     I also referred the pt to my [facility]-Vaishali.  He did not verbalize any opposition to taking the ASA just simply said he was not taking it. He seemed very confused to me and told me his meds were not being packed yet and I could hear his pill bottles as I did the med rec. I did say he was taking the Vitamin D and I do not think he would be opposed to going to see neuro psych. I think that is a good idea, something is definitely going on. I will ask him to start taking the ASA 81 mg daily.    Gi   ----- Message -----  From: Karen Chacon MD  Sent: 8/30/2019  12:17 PM  To: Gi Snowden RN, NELSON Barnett,  He is not eligible for insurance coverage of CGM because he is not on insulin and does not need to check his glucose TID. He will have to pay out of pocket for it and he doesn't have that money.    Please get him involved in any patient assistance available, he lost his job recently.    He seems to be having worsening dementia. At each appointment he is more confused. Is he willing to see neuropsych?    Regarding meds, he should be on the ASA daily. Otherwise his pills are packed as follows, so he is taking the Vit D. I will ask pharmacy to add the ASA to the next one, but could you ask him to take a daily ASA until then? Is he  refusing to take ASA?    Adherence packed for 84 days:     Amlodipine 40 mg  Atorvastatin 40 mg  Glipizide ER 10 mg  Lisinopril 10 mg  Metformin  mg  Vitamin D3 5000 U    Electronically signed by Nichole Singh at 8/27/2019  1:37 PM       ----- Message -----  From: Magdalena Stephens MA  Sent: 8/30/2019  10:30 AM  To: Karen Chacon MD        ----- Message -----  From: Gi Snowden, RN  Sent: 8/30/2019   9:55 AM  To: Oswaldo Lindsay Staff      Hi, I am Gi Snowden, RN with the Outpatient Care Management/Disease Management team here at [facility]. I received a referral on the above patient and enrolled him yesterday. Pt has apraxia and there was some times throughout the assessments that I had to repeat my questions or wait for him to look up information on his phone to give me answers.    Medication reconciliation completed, discrepancies noted and Pt is not clear on his medications. Pt had his medication bottles with him as we did medication reconciliation and when asked specifically about certain meds and I spelled them to him pt reported he was not on that medication. Pt then said that I have not listed certain meds which turned out to be the same ones I had just asked him about. Pt is not clear on the names, dosages and purposes for all of his medications, which I will work with him on. Pt reports he will be starting pill packaging when he goes to his next appointment at [facility]. Pt stated he will bring his medications to the pharmacy to have them packaged. Pt reports he has trouble affording his medications and you are assisting him with that. Pt does not recall speaking to anyone with PAP regarding assistance.    Pt stated he was not taking:  Aspirin 81mg daily  Vitamin D 5000 units daily  Nizoral 2% cream to toes daily  Polyethylene glycol suspension as needed    I just wanted to clarity some things with you regarding his care and medications:     Has he been referred to PAP for any type  of medication cost assistance?  Pt said during assessment that he is not checking his BS and you were getting him a CBGM, Chidi Freestyle. Can you let me know how that is going and if you need assistance with that?  Usually pts have to have a months worth of logs for ins to cover that monitor and also have a trial with it for a week or two.      Thank you for your time and assistance to this matter.     Kindest regards,     Gi Snowden RN   Outpatient Case Management/Disease Management

## 2019-09-04 ENCOUNTER — TELEPHONE (OUTPATIENT)
Dept: PRIMARY CARE CLINIC | Facility: CLINIC | Age: 69
End: 2019-09-04

## 2019-09-04 NOTE — PROGRESS NOTES
Summary:  LCSW received a referral from OPCM RN for the following patient identified barriers: financial.  Completed assessment with pt telephonically.  Pt is a 69 yo  male who lives alone in a rented home.  Pt is ambulatory, independent with ADL's, and drives.  Pts only son  in  from Cystic Fibrosis; pt is an only child.  Friends in [facility] are identified as primary source of support.  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  Pt reports medical bills, medications, food, and occasionally utility bills are source of financial strain.  OPCM RN referred pt to PAP.  Per conversation with Anabel Ibanez with [facility], pt may be eligible for [place].  Anabel will contact [facility] rep Sunny Meade with request to reach out to pt to apply.  If pt is eligible for QMB, he will likely be eligible for [employer] plan which offers chronic condition meals as a benefit.  Pt is interested in chronic condition meals; he declines food bank resources. Pt receives $15/month food stamps.      Interventions:  Collaborate with Medicaid to assist with Insurance Application.   Collaborate with OPCM RN as appropriate to meet patient needs.  Collaborate with physician as appropriate to meet patient needs  Complete community search for available resources on [facility] Assistance or utility assistance or Well Dine.  Discuss and provide Advance Directive Form.  Discuss patient care needs and potential barriers.  Empower patient/caregiver to discuss End of life issues with family, Physician and/or Power of .  Provide education on advance care planning.  Provide financial assistance resource(s).  Provide supportive counseling.  Provide utility assistance resource(s).  Refer to community resource(s)-[facility].  Review [employer] insurance benefits with patient/caregiver.    Plan:  Pt agrees to follow up  in 2 weeks  Confirm receipt of mailed resources  Answer any questions  Call PACS with pt to be screened for eligibility for financial assistance    Todays OPCM Self-Management Care Plan was developed with the patients/caregivers input and was based on identified barriers from todays assessment.  Goals were written today with the patient/caregiver and the patient has agreed to work towards these goals to improve his/her overall well-being. Patient verbalized understanding of the care plan, goals, and all of today's instructions. Encouraged patient/caregiver to communicate with his/her physician and health care team about health conditions and the treatment plan.  Provided my contact information today and encouraged patient/caregiver to call me with any questions as needed.

## 2019-09-05 ENCOUNTER — TELEPHONE (OUTPATIENT)
Dept: PRIMARY CARE CLINIC | Facility: CLINIC | Age: 69
End: 2019-09-05

## 2019-09-06 NOTE — PROGRESS NOTES
Adherence packed for 28 days:     Amlodipine 40 mg  Atorvastatin 40 mg  Glipizide ER 10 mg  Lisinopril 10 mg  Metformin  mg (2 tablets)      Did not pack the Vitamin D3 5000 U. Patient brought in home meds and we packed what was in his bag

## 2019-09-09 ENCOUNTER — TELEPHONE (OUTPATIENT)
Dept: INTERNAL MEDICINE | Facility: CLINIC | Age: 69
End: 2019-09-09

## 2019-09-09 ENCOUNTER — OUTPATIENT CASE MANAGEMENT (OUTPATIENT)
Dept: ADMINISTRATIVE | Facility: OTHER | Age: 69
End: 2019-09-09

## 2019-09-09 ENCOUNTER — PATIENT MESSAGE (OUTPATIENT)
Dept: ADMINISTRATIVE | Facility: OTHER | Age: 69
End: 2019-09-09

## 2019-09-09 DIAGNOSIS — R41.89 COGNITIVE IMPAIRMENT: Primary | ICD-10-CM

## 2019-09-09 NOTE — TELEPHONE ENCOUNTER
Gi and Gini,  The pill packs are no longer push through with your thumbs. He should pull the tab off at the white edge (pic below). If he does not understand through the phone, please have him come back to clinic so that we can show him.     Sounds like he would also benefit from Neuropsych testing. I placed the referral. Please help him make an appt by 3-way calling this number [phone] for appt.      Thanks,  MICK

## 2019-09-09 NOTE — PROGRESS NOTES
9/9/19  Summary:  Attempted to follow up for Outpatient Care Management    Interventions:  Left message requesting a return call, letter mailed (and sent via pt portal if pt is active portal) requesting a return call.      Plan:  Scheduled pt for call back. Gi Snowden RN    9/9/19  Incoming call received from Pt returning my call. Pt reports he has not received educational materials yet. Pt reports he picked up his medications that are packed by the day. Pt reports he has not started them yet because they are too hard to get out. Pt reports he was going to go back to the pharmacy on Thursday to ask them for assistance. Asked pt to get his pill packs while he was on the phone with me so I could assist him with opening the pack. Discussed with pt pushing the pills from the front of the clear bubble marked the 9th. Pt reports he could not get the pills out. Discussed with pt turning the pack over and putting his fingernail through the foil on the 9th day which is today. Pt reports he could not get his nail through the foil on the back. Asked pt if he had a fork or butter knife he could use to perforate the foil, pt stated he probably and would try later. Discussed with pt restarting 81mg daily of aspirin per Dr. Chacon. Pt reports he would start that tomorrow with is Vitamin D. Pt reports he has started taking an OTC medication for his memory. Medication is Meuriva, he states he is taking 5mg daily. Pt reports he did not discuss this medication with Dr. Chacon prior to starting it.  Discussed with pt Neuropsych testing and if he would be open to it. Pt reports he would be willing to see a Neuropsych and have testing done. In basket sent to Dr. Chacon regarding medication and pt being on board with seeing Neuropsych. Pt reports he will be meeting with someone tomorrow in [place] regarding applying for medicaid. Pt reports he is not testing his BS yet as he does not have a meter but will be  getting a card in to pay for a meter. Pt reports he is waiting for a [employer] card that is being mailed to him and he will be able to get his meter free through [employer]. Pt reports he has been in contact with [facility] regarding resources. Pt inquired to this nurse about medications Dr. Chacon ordered for him that were supposed to be sent to him packed in ice from [employer]. Pt did not know the name of the medications he was inquiring about. When this nurse looked back on last office visit note with Dr. Chacon there was mention of [employer] well dine meals. When this nurse asked the pt about the well dine meals he stated that is what he was referring to. Reminded pt he is working with [facility] regarding food resources and she will be following up with him. Discussed pt currently has an HMO plan and it does not have a chronic condition well dine benefit. Pt verbalized understanding. Reviewed and discussed signs and symptoms of Hyperglycemia/Hypoglycemia: Extreme thirst, frequent urination, feeling tired, dizziness, hunger ,shakiness or dizziness, confusion or irritability, cold, clammy skin or sweating,nausea, fast breathing and blurred vision. Discussed when to contact Provider. Will follow up in 2 weeks, pt in agreement.     Interventions:  In basket PCP regarding otc medication pt started, issues with pill packaging and neuropsych testing    Assess for retention of the signs and symptoms of disease specific exacerbation.  Assess patient's ability to perform ADLs.  Empower patient/caregiver to discuss treatment plan with Physician/care team.  Encourage compliance with Physician follow-ups.  Collaborate with Physician as appropriate to meet patient needs.  Encourage Medication Compliance.  Review eating/nutrition habits.  Encourage Dietary Compliance.  Reviewed: Signs and symptoms of Hyperglycemia/Hypoglycemia  · Extreme thirst  · Frequent urination  · Feeling tired  · Dizziness  · Blurry vision  · Shakiness or  dizziness  · Confusion or irritability  · Cold, clammy skin or sweating  · Feelings of hunger      Plan:  Diabetes: continue education  Facilitate referral to Diabetic class.  In basket Alba Man regarding scheduling diabetic education.   Encourage Medication Compliance.  Review eating/nutrition habits.  Encourage Dietary Compliance.  Encourage Exercise.  Review: Healthy Carbohydrates  · Whole grain breads and cereal  · Brown rice   · Whole-wheat pasta  · Fruits and vegetables  · Dry beans    Review: Healthy Proteins  · Fish  · Plant proteins - dry beans, peas, nuts, and soy  · Lean meat with all visible fat removed  · Poultry with skin removed  · Low fat or nonfat milk, cheese, and yogurt    Review: Unhealthy Fats and Sugars  · Butter  · Luncheon meat  · Candy bars  · Regular sodas  · Gonzalez  ·      Review: If HbA1c results are high, the patient is at higher risk to develop complications.    Todays OPCM Self-Management Care Plan was developed with the patients/caregivers input and was based on identified barriers from todays assessment.  Goals were written today with the patient/caregiver and the patient has agreed to work towards these goals to improve his/her overall well-being. Patient verbalized understanding of the care plan, goals, and all of today's instructions. Encouraged patient/caregiver to communicate with his/her physician and health care team about health conditions and the treatment plan.  Provided my contact information today and encouraged patient/caregiver to call me with any questions as needed.

## 2019-09-09 NOTE — TELEPHONE ENCOUNTER
----- Message from Gi Snowden RN sent at 9/9/2019 12:39 PM CDT -----  Dr. BARTON,     I was finally able to get in touch with Mr. Gonzalez today. Pt has his pill packs but cannot get them open and planned to go back to pharmacy Thursday and see how to open them. I walked the pt through how to open the pill packs by pushing the clear bubble with his 2 thumbs but he could not get it open that way. I then discussed with him turning the pack over and putting his fingernail through the foil backing. Pt stated he could not open it that way. I asked the pt if he has a butter knife or fork he could use to perforate the back, pt stated he probably did and he would try it later. Encouraged pt to work on opening the 9th day of his pill pack and taking his medications for today.    Pt reported he is taking Meuriva 5mg daily for his memory. Pt reports this is an OTC to help with memory issues. Discussed Neuropsych testing with pt and he is on board with the plan. Also discussed with pt taking ASA 81mg daily, he reports he will start taking it.     During my call the pt asked me about medications Dr. Chacon ordered for him that were supposed to be sent to him packed in ice from [employer]. Pt did not know the name of the medications he was inquiring about. When I looked back on last office visit note with you there was mention of [employer] well dine meals. When I asked the pt about the well dine meals he stated that is what he was referring to.     I am concerned this pt is not able to care for himself on his own. He is easily confused and very forgetful. He is also driving himself places. He told me he has an appointment with someone tomorrow in [facility] regarding his medicaid application but could not tell me who the person he was meeting was. He said he had the address saved in his phone for tomorrow.     Please let me know if there is anything else you would like me to discuss with the pt. I will be following up with him  in 2 weeks. He is also working with [facility]- Vaishali Shore. Pt's current [employer] plan does not cover chronic care well dine meals for the pt. Vaishali is working with getting him on medicaid and he may then qualify for Humana SNP.     Kindest regards,     Gi Snowden RN

## 2019-09-10 ENCOUNTER — OUTPATIENT CASE MANAGEMENT (OUTPATIENT)
Dept: ADMINISTRATIVE | Facility: OTHER | Age: 69
End: 2019-09-10

## 2019-09-10 NOTE — PROGRESS NOTES
9/10/19  Summary:  Attempted to follow up with Pt regarding pill packaging and making appt with Neuropsych.     Interventions:  Left message requesting a return call.      Plan:  Scheduled pt for call back. Gi Snowden RN    9/10/19    Summary:  Received incoming call from Pt who was returning my call.     Interventions:  Pt reports he does not have his pill packs with him. Explained to pt how to open pill packs and he can go to back to the clinic if he cannot open them once he tries, pt verbalized understanding. Ended call due to pt stating he was driving.     Plan:  Scheduled pt for follow up call, will address pts ability to open pill pack, making appt with Neuropsych for testing and plan of care. Neuropsych referral in place.    Todays OPCM Self-Management Care Plan was developed with the patients/caregivers input and was based on identified barriers from todays assessment.  Goals were written today with the patient/caregiver and the patient has agreed to work towards these goals to improve his/her overall well-being. Patient verbalized understanding of the care plan, goals, and all of today's instructions. Encouraged patient/caregiver to communicate with his/her physician and health care team about health conditions and the treatment plan.  Provided my contact information today and encouraged patient/caregiver to call me with any questions as needed.

## 2019-09-10 NOTE — TELEPHONE ENCOUNTER
Explained to pt how to poke a hole in the back if the pill pack. Called neuropsych to get an appt w/. Awaiting a call from his staff nurse.

## 2019-09-18 ENCOUNTER — TELEPHONE (OUTPATIENT)
Dept: ORTHOPEDICS | Facility: CLINIC | Age: 69
End: 2019-09-18

## 2019-09-18 ENCOUNTER — TELEPHONE (OUTPATIENT)
Dept: PHYSICAL MEDICINE AND REHAB | Facility: CLINIC | Age: 69
End: 2019-09-18

## 2019-09-18 NOTE — TELEPHONE ENCOUNTER
----- Message from Jeanmarie Oates, Patient Care Assistant sent at 9/18/2019  3:38 PM CDT -----  Contact: Self  Pt missed a call from the office.    Pt can be reached at [phone].

## 2019-09-18 NOTE — TELEPHONE ENCOUNTER
----- Message from Karen Pinedachloe sent at 9/18/2019  1:54 PM CDT -----  Contact: pt at [phone]  Type:  Patient Returning Call    Who Called:pt  Who Left Message for Patient:Yi staff  Does the patient know what this is regarding?:yes  Would the patient rather a call back or a response via [facility]? #callBest Call Back Number:[phone]  Additional Information: Pt missed a call

## 2019-09-18 NOTE — TELEPHONE ENCOUNTER
Returned call to pt he wanted to let us know about his emg being scheduled so we can schedule him a follow up. Scheduled appt.

## 2019-09-19 ENCOUNTER — TELEPHONE (OUTPATIENT)
Dept: INTERNAL MEDICINE | Facility: CLINIC | Age: 69
End: 2019-09-19

## 2019-09-19 ENCOUNTER — OUTPATIENT CASE MANAGEMENT (OUTPATIENT)
Dept: ADMINISTRATIVE | Facility: OTHER | Age: 69
End: 2019-09-19

## 2019-09-19 NOTE — PROGRESS NOTES
Summary:  Follow up call made with pt who states he's doing ok.  Pt reports he's been having problems with his hand so is seeing a doctor on 10/1/19.  Pt states he hasn't received the mailed resources.  Pt confirms he has been in touch with someone, a female, about applying for Medicaid.  Pt states he needs to get some documents together and take to her.  This LCSW asked if Medicaid rep is Sunny Meade at [facility] site.  Pt can't recall her name.  Per pt, he's waiting for a check in the mail; states he doesn't have any money in his account.  Pt reports the check is from a loan he took out.  Pt states his electricity bill is due soon. This LCSW offered to provide possible resource.  Per pt, he doesn't have anything to write with so requests this LCSW emails the contact info to [email or web address].  Contact number for [employer] was emailed.  This LCSW asked if pt needs food.  Pt states he's doing ok with food; states PCP is trying to get him [employer] meals.  This LCSW educated pt he may not be eligible for meals unless he qualifies for a certain Medicaid plan.  If eligible for certain Medicaid plan, pt can change to [email or web address] [place] which offers 60 meals per year.  Encouraged pt to get documents to [facility] as it may take 30 days to determine eligibility. This LCSW offered to give pt food bank info; pt declines.  Spoke to Sunny Halina with [facility] who states she and pt completed a Medicaid QMB application on 9/10/19; states it can take up to 90 days to process an application.  If approved, likely won't be active until November as QMB doesn't cover retroactively.      Interventions:  Collaborate with Medicaid to assist with Insurance Application.   Collaborate with OPCM RN as appropriate to meet patient needs.  Collaborate with PCP  Provide supportive counseling.  Provide utility assistance resource(s).    Plan:  Pt agrees to follow up in 2 weeks  Confirm receipt of mailed resources  Call PACS with pt to be screened  for eligibility for financial assistance    Todays OPCM Self-Management Care Plan was developed with the patients/caregivers input and was based on identified barriers from todays assessment.  Goals were written today with the patient/caregiver and the patient has agreed to work towards these goals to improve his/her overall well-being. Patient verbalized understanding of the care plan, goals, and all of today's instructions. Encouraged patient/caregiver to communicate with his/her physician and health care team about health conditions and the treatment plan.  Provided my contact information today and encouraged patient/caregiver to call me with any questions as needed.

## 2019-09-19 NOTE — TELEPHONE ENCOUNTER
----- Message from Vaishali Shore LCSW sent at 9/19/2019  1:33 PM CDT -----  Contact: Vaishali Shore LCSW  I collaborated with [facility] re: possible Medicaid.  Pt met with Sunny Meade with [facility]/[place] on 9/10/19 and completed a Medicaid QMB application.  Per Sunny,  applications can take up to 90 days to process.  If approved, it likely won't be active until November as QMB doesn't cover retroactively.  If pt is approved for QMB he should be eligible for [employer] [place] plan; then we can get him the chronic condition meals.      Just wanted to make you aware.    Vaishali

## 2019-09-25 ENCOUNTER — TELEPHONE (OUTPATIENT)
Dept: PRIMARY CARE CLINIC | Facility: CLINIC | Age: 69
End: 2019-09-25

## 2019-09-27 ENCOUNTER — PATIENT OUTREACH (OUTPATIENT)
Dept: ADMINISTRATIVE | Facility: OTHER | Age: 69
End: 2019-09-27

## 2019-09-27 DIAGNOSIS — E11.59 TYPE 2 DIABETES MELLITUS WITH OTHER CIRCULATORY COMPLICATION, UNSPECIFIED WHETHER LONG TERM INSULIN USE: Primary | ICD-10-CM

## 2019-10-01 ENCOUNTER — TELEPHONE (OUTPATIENT)
Dept: PRIMARY CARE CLINIC | Facility: CLINIC | Age: 69
End: 2019-10-01

## 2019-10-01 ENCOUNTER — TELEPHONE (OUTPATIENT)
Dept: PHYSICAL MEDICINE AND REHAB | Facility: CLINIC | Age: 69
End: 2019-10-01

## 2019-10-01 ENCOUNTER — OUTPATIENT CASE MANAGEMENT (OUTPATIENT)
Dept: ADMINISTRATIVE | Facility: OTHER | Age: 69
End: 2019-10-01

## 2019-10-01 ENCOUNTER — PATIENT MESSAGE (OUTPATIENT)
Dept: ADMINISTRATIVE | Facility: OTHER | Age: 69
End: 2019-10-01

## 2019-10-01 NOTE — PROGRESS NOTES
10/1/19  Summary:  Attempted to complete initial assessment for Outpatient Care Management    Interventions:  Left message requesting a return call, letter mailed (and sent via pt portal if pt is active portal) requesting a return call.      Plan:  Scheduled pt for call back. Gi Snowden RN    10/1/19  Nancy sent to PCP regarding provider for neuropsych testing.     10/2/19  Chart reviewed. Contacted pt for follow up call. Pt seemed confused about who I was and immediately began to discuss moving his appointment with Dr. Thurston up to an earlier date. In spencer sent to Dr. Thurston's staff regarding contacting pt about scheduling. Asked pt if he was able to go to the pharmacy and learn how to open his pill packs. Pt stated he had not but he was not concerned because he would be going to see his PCP in 3 weeks. Discussed with pt the importance of taking his medications daily and that he has missed his meds for almost a month. Pt stated the medications were packed wrong and it should have been each pill packed individually, not all of his pills for the day in one pouch. I attempted to explain to the pt how the pills are packed and that those are the pills he should take each day. Pt could not understand what I was saying and became frustrated. I asked the pt if he could go to the pharmacy today so he could learn how to open the pill packs and discuss his concerns with the pharmacist. Pt stated he could not that he would just wait and hung up the phone. No answer upon call back. In basket sent to PCP regarding medication compliance and pts confusion. Will follow up in 1 week.     Interventions:  In basket PCP regarding issues with pill packaging and non compliance with medications    Educated pt on importance of medication compliance    Plan:  Call pt 3 way to schedule Neuropsych [phone]  Diabetes: continue education  Facilitate referral to Diabetic class.  In spencer Man regarding scheduling diabetic  education.   Encourage Medication Compliance.  Review eating/nutrition habits.  Encourage Dietary Compliance.  Encourage Exercise.  Review: Healthy Carbohydrates  · Whole grain breads and cereal  · Brown rice   · Whole-wheat pasta  · Fruits and vegetables  · Dry beans    Review: Healthy Proteins  · Fish  · Plant proteins - dry beans, peas, nuts, and soy  · Lean meat with all visible fat removed  · Poultry with skin removed  · Low fat or nonfat milk, cheese, and yogurt    Review: Unhealthy Fats and Sugars  · Butter  · Luncheon meat  · Candy bars  · Regular sodas  · Gonzalez  ·      Review: If HbA1c results are high, the patient is at higher risk to develop complications.    Todays OPCM Self-Management Care Plan was developed with the patients/caregivers input and was based on identified barriers from todays assessment.  Goals were written today with the patient/caregiver and the patient has agreed to work towards these goals to improve his/her overall well-being. Patient verbalized understanding of the care plan, goals, and all of today's instructions. Encouraged patient/caregiver to communicate with his/her physician and health care team about health conditions and the treatment plan.  Provided my contact information today and encouraged patient/caregiver to call me with any questions as needed.

## 2019-10-01 NOTE — TELEPHONE ENCOUNTER
----- Message from Magdalena Stephens MA sent at 10/1/2019 11:55 AM CDT -----      ----- Message -----  From: Gi Snowden RN  Sent: 10/1/2019  11:41 AM CDT  To: Oswaldo Lindsay Staff    Dr. BARTON,     I attempted to contact Mr. Gonzalez today and left him a message to call me back. He usually will call me back to follow up. My question for you is I see his referral for neuropsychology. Is there a particular providers office you had in mind? I wanted to reach out to them and ask them to call the pt to schedule since I do not see anything with them scheduled for him yet.     Kindest regards,     Gi Snowden RN

## 2019-10-01 NOTE — LETTER
October 1, 2019    Neptali Gonzalez  [address] [place] [address]             [facility] [address] [place] [address] Dear Mr. Gonzalez,    I work with [facility] Outpatient Case Management Department. I have been unsuccessful at reaching you to follow-up to see how you have been doing. Please call me back at your earliest convenience to discuss your health care needs.      I can be reached at [phone] from 8:00AM to 4:30 PM on Monday thru Friday. [facility] also has a program where a nurse is available 24/7 to answer questions or provide medical advice, their number is [phone].    Thanks,        Gi Snowden RN  Outpatient Case Management/Disease Management  [phone]

## 2019-10-02 ENCOUNTER — TELEPHONE (OUTPATIENT)
Dept: ORTHOPEDICS | Facility: CLINIC | Age: 69
End: 2019-10-02

## 2019-10-02 NOTE — TELEPHONE ENCOUNTER
----- Message from Gi Snowden RN sent at 10/2/2019  9:47 AM CDT -----    Hi, I am Gi Snowden RN with the Outpatient Care Management/Disease Management team here at [facility]. I have been working with Mr. Gonzalez and spoke to him this morning. The pt would like to see if his appointment can be moved up to an earlier date? Can someone please contact him regarding his appointment?    Kindest regards,     Gi Snowden RN   Outpatient Case Management/Disease Management

## 2019-10-02 NOTE — TELEPHONE ENCOUNTER
"Spoke with resident stated, "[facility] scheduled my test for October 14th." advised resident that f/u appt w/ Dr. Thurston has been scheduled for same day. Resident states he will call PM to see if earlier appt. Is available and will call back to let us know.   "

## 2019-10-02 NOTE — TELEPHONE ENCOUNTER
----- Message from Magdalena Stephens MA sent at 10/2/2019  9:37 AM CDT -----      ----- Message -----  From: Gi Snowden RN  Sent: 10/2/2019   9:32 AM CDT  To: Oswaldo Lindsay Staff    Dr. BARTON,     Please disregard my previous message regarding neuropsych scheduling. I forgot you had asked me to call neuropsych with the pt on 3 [address], I saw the note today when I was reviewing my notes on the pt.  I have the information and will attempt to reach him again today and get him scheduled with them for testing.     Kindest regards,     Gi Snowden RN

## 2019-10-02 NOTE — TELEPHONE ENCOUNTER
----- Message from Seth Reardon MA sent at 10/2/2019 11:46 AM CDT -----  Good morning     This patient was scheduled with Dr. Gatica for an EMG on yesterday but No Showed the appointment. I spoke with the pt this morning to reschedule his EMG. This pt will need his f/u with Dr. Thurston r/s to discuss EMG results to a later date now. Thanks

## 2019-10-15 ENCOUNTER — PROCEDURE VISIT (OUTPATIENT)
Dept: PHYSICAL MEDICINE AND REHAB | Facility: CLINIC | Age: 69
End: 2019-10-15
Payer: MEDICARE

## 2019-10-15 DIAGNOSIS — R20.0 NUMBNESS OF RIGHT HAND: ICD-10-CM

## 2019-10-15 PROCEDURE — 95909 PR NERVE CONDUCTION STUDY; 5-6 STUDIES: ICD-10-PCS | Mod: HCNC,S$GLB,, | Performed by: PHYSICAL MEDICINE & REHABILITATION

## 2019-10-15 PROCEDURE — 95909 NRV CNDJ TST 5-6 STUDIES: CPT | Mod: HCNC,S$GLB,, | Performed by: PHYSICAL MEDICINE & REHABILITATION

## 2019-10-15 PROCEDURE — 95886 MUSC TEST DONE W/N TEST COMP: CPT | Mod: HCNC,S$GLB,, | Performed by: PHYSICAL MEDICINE & REHABILITATION

## 2019-10-15 PROCEDURE — 95886 PR EMG COMPLETE, W/ NERVE CONDUCTION STUDIES, 5+ MUSCLES: ICD-10-PCS | Mod: HCNC,S$GLB,, | Performed by: PHYSICAL MEDICINE & REHABILITATION
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
## 2019-10-15 NOTE — PROGRESS NOTES
LCSW mailed letter after 2nd attempt for SW follow-up with contact information requesting a return call and pt has not reached out to this LCSW.  LCSW will close case and notified OPCM RN.

## 2019-10-16 ENCOUNTER — PATIENT OUTREACH (OUTPATIENT)
Dept: ADMINISTRATIVE | Facility: OTHER | Age: 69
End: 2019-10-16

## 2019-10-16 DIAGNOSIS — E11.9 DIABETES MELLITUS WITHOUT COMPLICATION: Primary | ICD-10-CM

## 2019-10-21 ENCOUNTER — OUTPATIENT CASE MANAGEMENT (OUTPATIENT)
Dept: ADMINISTRATIVE | Facility: OTHER | Age: 69
End: 2019-10-21

## 2019-10-21 ENCOUNTER — PATIENT MESSAGE (OUTPATIENT)
Dept: ADMINISTRATIVE | Facility: OTHER | Age: 69
End: 2019-10-21

## 2019-10-21 NOTE — LETTER
October 21, 2019    Neptali Gonzalez  [address] [place] [address]             [facility] [address] [place] [address] Dear Mr. Gonzalez,    I work with [facility] Outpatient Case Management Department. I have been unsuccessful at reaching you to follow-up to see how you have been doing. Please call me back at your earliest convenience to discuss your health care needs.      I can be reached at [phone] from 8:00AM to 4:30 PM on Monday thru Friday. [facility] also has a program where a nurse is available 24/7 to answer questions or provide medical advice, their number is [phone].    Thanks,        Gi Snowden RN  Outpatient Case Management/Disease Management  [phone]

## 2019-10-21 NOTE — LETTER
November 6, 2019    Neptali Gonzalez  [address] [place] [address]             [facility] [address] [place] [address] Dear Mr. Gonzalez,    I work with [facility] Outpatient Case Management Department. I have been unsuccessful at reaching you to follow-up to see how you have been doing. Please call me back at your earliest convenience to discuss your health care needs.      I can be reached at [phone] from 8:00AM to 4:30 PM on Monday thru Friday. [facility] also has a program where a nurse is available 24/7 to answer questions or provide medical advice, their number is [phone].    Thanks,        Gi Snowden RN  Outpatient Case Management/Disease Management  [phone]

## 2019-11-06 ENCOUNTER — PATIENT MESSAGE (OUTPATIENT)
Dept: ADMINISTRATIVE | Facility: OTHER | Age: 69
End: 2019-11-06

## 2019-11-06 ENCOUNTER — TELEPHONE (OUTPATIENT)
Dept: PRIMARY CARE CLINIC | Facility: CLINIC | Age: 69
End: 2019-11-06

## 2019-11-06 NOTE — TELEPHONE ENCOUNTER
----- Message from Magdalena Stephens MA sent at 11/6/2019 10:29 AM CST -----  FYKATIE I will try to contact pt  ----- Message -----  From: Gi Snowden RN  Sent: 11/6/2019  10:26 AM CST  To: Oswaldo Lindsay Staff    Dr. BARTON,     I have made 3 attempts to contact Mr. Gonzalez, I have left voicemails and mailed 2 letters. I will attempt once more next week then discharge him. He cancelled his appt with you on 10/22 and was a no show for Dr. Thurston on 10/21. I have not been able to get him on the phone to call neuropsych 3 way for scheduling. I just wanted to make you aware.    Kindest regards,     Gi Snowden RN

## 2019-11-22 NOTE — PROGRESS NOTES
4th Attempt to follow up for OPCM. Left message requesting a return call, a letter was mailed (and sent via pt portal if pt is active on portal)at the time of the 1st attempt.  Close case. Gi Snowden RN

## 2019-12-09 ENCOUNTER — TELEPHONE (OUTPATIENT)
Dept: INTERNAL MEDICINE | Facility: CLINIC | Age: 69
End: 2019-12-09

## 2019-12-09 ENCOUNTER — TELEPHONE (OUTPATIENT)
Dept: PRIMARY CARE CLINIC | Facility: CLINIC | Age: 69
End: 2019-12-09

## 2019-12-09 ENCOUNTER — OFFICE VISIT (OUTPATIENT)
Dept: PRIMARY CARE CLINIC | Facility: CLINIC | Age: 69
End: 2019-12-09
Payer: MEDICARE

## 2019-12-09 ENCOUNTER — LAB VISIT (OUTPATIENT)
Dept: LAB | Facility: HOSPITAL | Age: 69
End: 2019-12-09
Attending: INTERNAL MEDICINE
Payer: MEDICARE

## 2019-12-09 ENCOUNTER — OUTPATIENT CASE MANAGEMENT (OUTPATIENT)
Dept: ADMINISTRATIVE | Facility: OTHER | Age: 69
End: 2019-12-09

## 2019-12-09 VITALS
WEIGHT: 155.88 LBS | HEIGHT: 66 IN | OXYGEN SATURATION: 99 % | SYSTOLIC BLOOD PRESSURE: 130 MMHG | HEART RATE: 69 BPM | BODY MASS INDEX: 25.05 KG/M2 | DIASTOLIC BLOOD PRESSURE: 90 MMHG

## 2019-12-09 DIAGNOSIS — E55.9 HYPOVITAMINOSIS D: ICD-10-CM

## 2019-12-09 DIAGNOSIS — E78.2 DYSLIPIDEMIA WITH LOW HIGH DENSITY LIPOPROTEIN (HDL) CHOLESTEROL WITH HYPERTRIGLYCERIDEMIA DUE TO TYPE 2 DIABETES MELLITUS: ICD-10-CM

## 2019-12-09 DIAGNOSIS — R41.89 COGNITIVE IMPAIRMENT: ICD-10-CM

## 2019-12-09 DIAGNOSIS — E11.69 DYSLIPIDEMIA WITH LOW HIGH DENSITY LIPOPROTEIN (HDL) CHOLESTEROL WITH HYPERTRIGLYCERIDEMIA DUE TO TYPE 2 DIABETES MELLITUS: ICD-10-CM

## 2019-12-09 DIAGNOSIS — E11.8 TYPE 2 DIABETES MELLITUS WITH COMPLICATION, WITHOUT LONG-TERM CURRENT USE OF INSULIN: ICD-10-CM

## 2019-12-09 DIAGNOSIS — M46.92 UNSPECIFIED INFLAMMATORY SPONDYLOPATHY, CERVICAL REGION: ICD-10-CM

## 2019-12-09 LAB
ANION GAP SERPL CALC-SCNC: 11 MMOL/L (ref 8–16)
BUN SERPL-MCNC: 14 MG/DL (ref 8–23)
CALCIUM SERPL-MCNC: 9.5 MG/DL (ref 8.7–10.5)
CHLORIDE SERPL-SCNC: 100 MMOL/L (ref 95–110)
CO2 SERPL-SCNC: 26 MMOL/L (ref 23–29)
CREAT SERPL-MCNC: 1.5 MG/DL (ref 0.5–1.4)
EST. GFR  (AFRICAN AMERICAN): 54 ML/MIN/1.73 M^2
EST. GFR  (NON AFRICAN AMERICAN): 46 ML/MIN/1.73 M^2
ESTIMATED AVG GLUCOSE: 166 MG/DL (ref 68–131)
GLUCOSE SERPL-MCNC: 129 MG/DL (ref 70–110)
GLUCOSE SERPL-MCNC: 140 MG/DL (ref 70–110)
HBA1C MFR BLD HPLC: 7.4 % (ref 4–5.6)
MAGNESIUM SERPL-MCNC: 2.3 MG/DL (ref 1.6–2.6)
POTASSIUM SERPL-SCNC: 3.8 MMOL/L (ref 3.5–5.1)
SODIUM SERPL-SCNC: 137 MMOL/L (ref 136–145)

## 2019-12-09 PROCEDURE — 1159F PR MEDICATION LIST DOCUMENTED IN MEDICAL RECORD: ICD-10-PCS | Mod: HCNC,S$GLB,, | Performed by: INTERNAL MEDICINE

## 2019-12-09 PROCEDURE — 90662 IIV NO PRSV INCREASED AG IM: CPT | Mod: HCNC,S$GLB,, | Performed by: INTERNAL MEDICINE

## 2019-12-09 PROCEDURE — G0008 FLU VACCINE - HIGH DOSE (65+) PRESERVATIVE FREE IM: ICD-10-PCS | Mod: HCNC,S$GLB,, | Performed by: INTERNAL MEDICINE

## 2019-12-09 PROCEDURE — 1125F PR PAIN SEVERITY QUANTIFIED, PAIN PRESENT: ICD-10-PCS | Mod: HCNC,S$GLB,, | Performed by: INTERNAL MEDICINE

## 2019-12-09 PROCEDURE — 1125F AMNT PAIN NOTED PAIN PRSNT: CPT | Mod: HCNC,S$GLB,, | Performed by: INTERNAL MEDICINE

## 2019-12-09 PROCEDURE — 1159F MED LIST DOCD IN RCRD: CPT | Mod: HCNC,S$GLB,, | Performed by: INTERNAL MEDICINE

## 2019-12-09 PROCEDURE — 3080F PR MOST RECENT DIASTOLIC BLOOD PRESSURE >= 90 MM HG: ICD-10-PCS | Mod: HCNC,CPTII,S$GLB, | Performed by: INTERNAL MEDICINE

## 2019-12-09 PROCEDURE — 82962 GLUCOSE BLOOD TEST: CPT | Mod: HCNC,,, | Performed by: INTERNAL MEDICINE

## 2019-12-09 PROCEDURE — G0008 ADMIN INFLUENZA VIRUS VAC: HCPCS | Mod: HCNC,S$GLB,, | Performed by: INTERNAL MEDICINE

## 2019-12-09 PROCEDURE — 99215 PR OFFICE/OUTPT VISIT, EST, LEVL V, 40-54 MIN: ICD-10-PCS | Mod: HCNC,25,S$GLB, | Performed by: INTERNAL MEDICINE

## 2019-12-09 PROCEDURE — 1101F PR PT FALLS ASSESS DOC 0-1 FALLS W/OUT INJ PAST YR: ICD-10-PCS | Mod: HCNC,CPTII,S$GLB, | Performed by: INTERNAL MEDICINE

## 2019-12-09 PROCEDURE — 82962 POCT GLUCOSE, HAND-HELD DEVICE: ICD-10-PCS | Mod: HCNC,,, | Performed by: INTERNAL MEDICINE

## 2019-12-09 PROCEDURE — 83735 ASSAY OF MAGNESIUM: CPT | Mod: HCNC

## 2019-12-09 PROCEDURE — 3075F PR MOST RECENT SYSTOLIC BLOOD PRESS GE 130-139MM HG: ICD-10-PCS | Mod: HCNC,CPTII,S$GLB, | Performed by: INTERNAL MEDICINE

## 2019-12-09 PROCEDURE — 3075F SYST BP GE 130 - 139MM HG: CPT | Mod: HCNC,CPTII,S$GLB, | Performed by: INTERNAL MEDICINE

## 2019-12-09 PROCEDURE — 99499 RISK ADDL DX/OHS AUDIT: ICD-10-PCS | Mod: S$GLB,,, | Performed by: INTERNAL MEDICINE

## 2019-12-09 PROCEDURE — 99215 OFFICE O/P EST HI 40 MIN: CPT | Mod: HCNC,25,S$GLB, | Performed by: INTERNAL MEDICINE

## 2019-12-09 PROCEDURE — 90662 FLU VACCINE - HIGH DOSE (65+) PRESERVATIVE FREE IM: ICD-10-PCS | Mod: HCNC,S$GLB,, | Performed by: INTERNAL MEDICINE

## 2019-12-09 PROCEDURE — 80048 BASIC METABOLIC PNL TOTAL CA: CPT | Mod: HCNC

## 2019-12-09 PROCEDURE — 36415 COLL VENOUS BLD VENIPUNCTURE: CPT | Mod: HCNC

## 2019-12-09 PROCEDURE — 83036 HEMOGLOBIN GLYCOSYLATED A1C: CPT | Mod: HCNC

## 2019-12-09 PROCEDURE — 3080F DIAST BP >= 90 MM HG: CPT | Mod: HCNC,CPTII,S$GLB, | Performed by: INTERNAL MEDICINE

## 2019-12-09 PROCEDURE — 99499 UNLISTED E&M SERVICE: CPT | Mod: S$GLB,,, | Performed by: INTERNAL MEDICINE

## 2019-12-09 PROCEDURE — 1101F PT FALLS ASSESS-DOCD LE1/YR: CPT | Mod: HCNC,CPTII,S$GLB, | Performed by: INTERNAL MEDICINE

## 2019-12-09 RX ORDER — ACETAMINOPHEN 500 MG
5000 TABLET ORAL DAILY
Qty: 90 TABLET | Refills: 3 | Status: SHIPPED | OUTPATIENT
Start: 2019-12-09 | End: 2020-12-31 | Stop reason: SDUPTHER

## 2019-12-09 RX ORDER — GLIPIZIDE 2.5 MG/1
2.5 TABLET, EXTENDED RELEASE ORAL
Qty: 90 TABLET | Refills: 3 | Status: SHIPPED | OUTPATIENT
Start: 2019-12-09 | End: 2019-12-09

## 2019-12-09 RX ORDER — ASPIRIN 81 MG/1
81 TABLET ORAL DAILY
Qty: 90 TABLET | Refills: 3 | Status: SHIPPED | OUTPATIENT
Start: 2019-12-09 | End: 2020-01-09 | Stop reason: SDUPTHER

## 2019-12-09 RX ORDER — METFORMIN HYDROCHLORIDE 500 MG/1
500 TABLET, EXTENDED RELEASE ORAL DAILY
Qty: 90 TABLET | Refills: 3 | Status: SHIPPED | OUTPATIENT
Start: 2019-12-09 | End: 2020-12-31 | Stop reason: SDUPTHER

## 2019-12-09 NOTE — PATIENT INSTRUCTIONS
TODAY:  - labs today  - redo pill packs  - flu vaccine  - PCP appt on Mon 12/23  - podiatry and optometry appts on dane week or today  - Referral placed for Neuropsych evalution. Call [phone] for appt.

## 2019-12-09 NOTE — PROGRESS NOTES
Adherence packed for 28 days:       Atorvastatin 40 mg   Lisinopril 10 mg   Metformin  mg  Vitamin D3 5,000 IU

## 2019-12-09 NOTE — ASSESSMENT & PLAN NOTE
Decreased short term memory, ability to process complex tasks. MMSE:  · MMSE 22/30, but has poor judgement  · Refer to neuropsych

## 2019-12-09 NOTE — TELEPHONE ENCOUNTER
Used 2 pt identifiers and reviewed allergies prior to giving FLUZONE HD injection, VIS given then asked pt to wait 15 mins post injection for s/sx of adverse reactions.

## 2019-12-09 NOTE — ASSESSMENT & PLAN NOTE
HDL 37, but ASCVD risk high, questionable compliance with atorvastatin 40mg  · Continue statin   · Decreast metformin 500mg once daily  · Stop glipizide due to risk of lows  · Is not eligible for the freestyle apolinar (verified with insurance company)  · Can not afford out of pocket  · Needs eye and foot monitoring

## 2019-12-09 NOTE — PROGRESS NOTES
"Primary Care Provider Appointment    Subjective:      Patient ID: Neptali Gonzalez is a 69 y.o. male with cognitive impairment, DM, HLD HTN    Chief Complaint: Follow-up and Hand Pain (right hand )    Patient has NOT TAKEN ANY MEDS FOR THE PAST THREE MONTHS! He picked up pill packs but didn't take any of the meds because "they were supposed to be twice a day." Has 10# unintentional weight loss due to food insecurity.    Had coronary stent placed in 2007 at [facility]. Needs APT and statin, is taking neither. Also has h/o stroke. Patient has not taken his statin recently, is high risk for ASCVD:  The 10-year ASCVD risk score (Momo GONG Jr., et al., 2013) is: 39.8%    Values used to calculate the score:      Age: 69 years      Sex: Male      Is Non- : No      Diabetic: Yes      Tobacco smoker: No      Systolic Blood Pressure: 130 mmHg      Is BP treated: Yes      HDL Cholesterol: 48 mg/dL      Total Cholesterol: 227 mg/dL    Has untreated T2DM, POCT glucose was 140 today. Lat A1c was 9.5 in 7/2019. It's unclear whether he was drinking heavily at that time. He states "I drink a little bit of wine... Red wine."    Has transportation issues with travel from [place], [place]. It is an hour's drive.    Pills packed as follows: NOT COMPLIANT!  Adherence packed for 28 days:  Amlodipine 40 mg  Atorvastatin 40 mg  Glipizide ER 10 mg  Lisinopril 10 mg  Metformin  mg (2 tablets)  Did not pack the Vitamin D3 5000 U. Patient brought in home meds and we packed what was in his bag    Patient with notable memory issues today. MMSE performed by PRINCESS with this result: 22/30.    Past Surgical History:   Procedure Laterality Date    ARTHROSCOPIC REPAIR OF ROTATOR CUFF OF SHOULDER Right 11/7/2018    Procedure: ARTHROSCOPY WITH DISTAL CLAVICLE  EXCISION AND SUBACROMIAL  DECOMPRESSION;  Surgeon: Tuan Thurston Jr., MD;  Location: [facility];  Service: Orthopedics;  Laterality: Right;    CARDIAC " SURGERY      CORONARY STENT PLACEMENT      [facility] stent 2007      LITHOTRIPSY      TONSILLECTOMY         Past Medical History:   Diagnosis Date    CKD (chronic kidney disease)     Coronary artery disease 2002    stent    Diabetes mellitus     Hypertension     Kidney stones     MI (myocardial infarction)     Stroke        Social History     Socioeconomic History    Marital status:      Spouse name: Not on file    Number of children: Not on file    Years of education: Not on file    Highest education level: Not on file   Occupational History    Not on file   Social Needs    Financial resource strain: Very hard    Food insecurity:     Worry: Often true     Inability: Often true    Transportation needs:     Medical: No     Non-medical: No   Tobacco Use    Smoking status: Former Smoker     Packs/day: 1.00     Years: 3.00     Pack years: 3.00     Start date: 1/1/1967    Smokeless tobacco: Never Used   Substance and Sexual Activity    Alcohol use: No    Drug use: No    Sexual activity: Yes     Partners: Male     Birth control/protection: Condom   Lifestyle    Physical activity:     Days per week: Not on file     Minutes per session: Not on file    Stress: Not on file   Relationships    Social connections:     Talks on phone: Not on file     Gets together: Not on file     Attends Baptism service: Not on file     Active member of club or organization: Not on file     Attends meetings of clubs or organizations: Not on file     Relationship status: Not on file   Other Topics Concern    Not on file   Social History Narrative    Not on file       Review of Systems   Constitutional: Positive for activity change and appetite change. Negative for fatigue and fever.   Respiratory: Negative for shortness of breath.    Cardiovascular: Negative for chest pain and leg swelling.   Musculoskeletal: Positive for arthralgias, back pain and joint swelling.   Skin: Positive for wound.   Neurological:  "Positive for tremors, weakness and numbness. Negative for dizziness, seizures and speech difficulty.   Hematological: Bruises/bleeds easily.   Psychiatric/Behavioral: Negative for confusion and decreased concentration. The patient is not nervous/anxious.        Objective:   BP (!) 130/90   Pulse 69   Ht 5' 6" (1.676 m)   Wt 70.7 kg (155 lb 13.8 oz)   SpO2 99%   BMI 25.16 kg/m²     Physical Exam   Constitutional: He is oriented to person, place, and time. He appears well-developed.   Disheveled appearance   HENT:   Head: Normocephalic.   Eyes: EOM are normal.   Musculoskeletal: Normal range of motion.   Neurological: He is alert and oriented to person, place, and time.   Mild resting tremor in R hand (improved since last exam)  Decreased short term memory   Skin: Skin is warm and dry.   Ecchymoses and purpura on UE bilaterally     Psychiatric:   disheveled appearance  Easily confused   Nursing note and vitals reviewed.      Lab Results   Component Value Date    WBC 7.16 07/10/2019    HGB 16.3 07/10/2019    HCT 47.8 07/10/2019     07/10/2019    CHOL 227 (H) 07/10/2019    TRIG 204 (H) 07/10/2019    HDL 48 07/10/2019    ALT 17 07/10/2019    AST 13 07/10/2019     12/09/2019    K 3.8 12/09/2019     12/09/2019    CREATININE 1.5 (H) 12/09/2019    BUN 14 12/09/2019    CO2 26 12/09/2019    TSH 0.803 07/10/2019    INR 1.1 05/25/2016    HGBA1C 7.4 (H) 12/09/2019       RESULTS: Reviewed labs and images today    Assessment:   69 y.o. male with multiple co-morbid illnesses here to continue work-up of chronic issues notably with cognitive impairment, DM, HLD HTN       Plan:     Problem List Items Addressed This Visit        Neuro    Cognitive impairment     Decreased short term memory, ability to process complex tasks. MMSE:  · MMSE 22/30, but has poor judgement  · Refer to neuropsych         Relevant Orders    Ambulatory consult to Neuropsychology    Ambulatory referral to Outpatient Case Management       " Cardiac/Vascular    Dyslipidemia with low high density lipoprotein (HDL) cholesterol with hypertriglyceridemia due to type 2 diabetes mellitus     HDL 37, but ASCVD risk high, questionable compliance with atorvastatin 40mg  · Continue statin   · Decreast metformin 500mg once daily  · Stop glipizide due to risk of lows  · Is not eligible for the freestyle apolinar (verified with insurance company)  · Can not afford out of pocket  · Needs eye and foot monitoring         Relevant Medications    metFORMIN (GLUCOPHAGE-XR) 500 MG 24 hr tablet    aspirin (ECOTRIN) 81 MG EC tablet    Other Relevant Orders    Basic metabolic panel (Completed)    Hemoglobin A1c (Completed)    Magnesium    Ambulatory Referral to Optometry    Ambulatory Referral to Podiatry    Ambulatory referral to Outpatient Case Management       Endocrine    Uncontrolled secondary diabetes mellitus with stage 3 CKD (GFR 30-59)     GFR at goal, A1c elevated but has 10# weight loss due to food insecurity  · Decrease metformin to 500mg XR and stop glipizide  · Monitor kidney function q3 mos  · Continue ACE  · Pill packing to improve compliance         Relevant Medications    metFORMIN (GLUCOPHAGE-XR) 500 MG 24 hr tablet    Other Relevant Orders    POCT Glucose, Hand-Held Device (Completed)    Basic metabolic panel (Completed)    Hemoglobin A1c (Completed)    Magnesium    Ambulatory referral to Outpatient Case Management    Hypovitaminosis D    Relevant Medications    cholecalciferol, vitamin D3, (VITAMIN D3) 125 mcg (5,000 unit) Tab    Other Relevant Orders    Ambulatory referral to Outpatient Case Management       Orthopedic    Unspecified inflammatory spondylopathy, cervical region     Chronic neck pain  · Monitor  · Encouraged to continue physical activity         Relevant Orders    Ambulatory referral to Outpatient Case Management      Other Visit Diagnoses     Type 2 diabetes mellitus with complication, without long-term current use of insulin        Relevant  Medications    metFORMIN (GLUCOPHAGE-XR) 500 MG 24 hr tablet    aspirin (ECOTRIN) 81 MG EC tablet    Other Relevant Orders    Ambulatory Referral to Optometry    Ambulatory Referral to Podiatry    Ambulatory referral to Outpatient Case Management          Health Maintenance       Date Due Completion Date    TETANUS VACCINE 10/12/1967 ---    Shingles Vaccine (1 of 2) 10/12/1999 ---    Colonoscopy 10/12/1999 ---    Eye Exam 07/23/2019 7/23/2018    Override on 5/23/2017: Done (Eye Cam done)    Influenza Vaccine (1) 09/01/2019 11/5/2018- TODAY    Override on 10/6/2017: Done    Foot Exam 09/14/2019 9/14/2018 (Done)    Override on 9/14/2018: Done    Hemoglobin A1c 10/10/2019 7/10/2019- TODAY    Lipid Panel 07/10/2020 7/10/2019    High Dose Statin 08/30/2020 8/30/2019          Follow up in about 2 weeks (around 12/23/2019). Total face-to-face time was 60 min, 50% of this was spent on counseling and coordination of care. The following issues were discussed: with cognitive impairment, DM, HLD HTN    Karen Chacon MD/MPH  Internal Medicine  [facility]  [phone]

## 2019-12-09 NOTE — ASSESSMENT & PLAN NOTE
GFR at goal, A1c elevated but has 10# weight loss due to food insecurity  · Decrease metformin to 500mg XR and stop glipizide  · Monitor kidney function q3 mos  · Continue ACE  · Pill packing to improve compliance

## 2019-12-09 NOTE — TELEPHONE ENCOUNTER
Patient's A1c is 7.4, so taking the metformin 500mg only (and NOT the glipizide) is sufficient to treat this.    Did he get his pill packs today?     Thanks,  KJ

## 2019-12-09 NOTE — PROGRESS NOTES
Documentation of social work visit in [facility].   Pt denies financial barriers. Pt reports that he has [employer] insurance. Pt drove to [facility] today. Pt reports that he has no children. Pt has been  for 20 years. Pt reports that his support system consists of his friends Graeme and Oswaldo.  Pt reports that his monthly income is approximately 1,100. Pt lives in a trailer and pays $450 to rent the property where his trailer is located. Pt reports that he is a retired [employer] worker. 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 Pt denies SI/HI. Pt has several questions regarding the medications in his pill packs. [facility] LMSW directed all questions about medications to pt's PCP, Dr. Chacon. Dr. Chacon is currently with pt addressing medication questions.

## 2019-12-10 NOTE — TELEPHONE ENCOUNTER
Spoke w/ pt and advised he states he is taking his pill packs and will continue I tried to reschedule optometry appt no luck in [place] or the day he see's you, I did put it on a different day

## 2019-12-10 NOTE — TELEPHONE ENCOUNTER
"Spoke w/ pt and advised he stated he picked up the pill packs today .. Is the Glipizide in the pill packs? Pt stated "okay I will stop taking Glipizide"  "

## 2019-12-18 ENCOUNTER — OUTPATIENT CASE MANAGEMENT (OUTPATIENT)
Dept: ADMINISTRATIVE | Facility: OTHER | Age: 69
End: 2019-12-18

## 2019-12-18 NOTE — PROGRESS NOTES
Outpatient Care Management   - Low Risk Patient Assessment    Patient: Neptali Gonzalez  MRN:  [id number]  Date of Service:  12/18/2019  Completed by:  Estelle Hewitt LMSW  Referral Date: 12/09/2019  Program: OPCM Low Risk    Reason for Visit   Patient presents with    OPCM SW First Assessment Attempt    Social Work Assessment - Low/Mod Risk    PHQ-9       Patient Summary     OPCM Social Work Assessment (Low/Moderate Risk)    General  Level of Caregiver support:  Member independent and does not need caregiver assistance  Have you had to make a decision between paying for any of the following in the last 2 months?:  Food, Shelter, Medical Care  Transportation means:  Self  Employment status:  Not employed  Current symptoms:  None  Assessments  Was the PHQ Depression Screening completed this visit?:  Yes  Was the TODD-7 Screening completed this visit?:  Yes       LMSW completed assessment with patient. Pt reports living alone and reports being independent with ADLs. Pt does not use assistive devices to ambulate. Pt reports having difficulty with accessing food and affordable housing. Pt reports he is not currently receiving food assistance. Pt reports his PCP office was suppose to send him meals. LMSW asked Pt if he was referring to frozen meals covered through his insurance. Pt states he is unsure. LMSW informed Pt that individuals with certain medical conditions under his insurance provider can receive frozen meals however he must have special needs plan. LMSW informed Pt that she will contact his insurance company to verify if he has coverage. Pt voiced understanding. Pt reports he is having housing issues and will need to find new place to live. LMSW offered to provide Pt with information for afforable housing programs in his area. Pt states he has some outstanding bills with [facility] and has applied for [facility]. Pt reports he does not know the status of his application and  will need contact number to follow up. LMSW informed Pt that he will be provided with information to speak to OFA specialist. No other needs or concerns identified by Pt. Pt notified that LMSW will call insurance and follow up with his with process for obtaining frozen meals.     Plan:  LMSW will contact [email or web address] insurance on Pt's behalf to identify whether he qualifies for their meal program and follow up with Pt on tomorrow, 12/19/19.         Complex Care Plan     Care plan was discussed and completed today with input from patient and/or caregiver.    Goals    None         Patient Instructions     No follow-ups on file.    Todays OPCM Self-Management Care Plan was developed with the patients/caregivers input and was based on identified barriers from todays assessment.  Goals were written today with the patient/caregiver and the patient has agreed to work towards these goals to improve his/her overall well-being. Patient verbalized understanding of the care plan, goals, and all of today's instructions. Encouraged patient/caregiver to communicate with his/her physician and health care team about health conditions and the treatment plan.  Provided my contact information today and encouraged patient/caregiver to call me with any questions as needed.

## 2019-12-19 ENCOUNTER — OUTPATIENT CASE MANAGEMENT (OUTPATIENT)
Dept: ADMINISTRATIVE | Facility: OTHER | Age: 69
End: 2019-12-19

## 2019-12-19 NOTE — LETTER
December 20, 2019    Neptali Burleson Lisa  [address] [place] [address]             [facility] [address] [place] [address] .Dear Mr. Gonzalez:    I am writing from the Outpatient Complex Care Management Department at [facility].  I received a referral from Dr. Chacon to contact you or your caregiver regarding any needs you may have. I have attempted to contact you or your cargeiver by phone two times unsuccessfully. Please contact the Outpatient Complex Care Management Department at [phone] if you would like to discuss your needs.      Sincerely,         Estelle Hewitt, [facility]

## 2019-12-20 NOTE — PROGRESS NOTES
This LMSW attempted to reach patient/caregiver to provide resource and left msg requesting a return call.  Letter with contact information was sent via US mail to patient/caregiver. Referral source notified.

## 2019-12-23 ENCOUNTER — PATIENT OUTREACH (OUTPATIENT)
Dept: ADMINISTRATIVE | Facility: OTHER | Age: 69
End: 2019-12-23

## 2019-12-23 ENCOUNTER — TELEPHONE (OUTPATIENT)
Dept: PRIMARY CARE CLINIC | Facility: CLINIC | Age: 69
End: 2019-12-23

## 2019-12-24 NOTE — TELEPHONE ENCOUNTER
Please call patient. Is he ok? Does he need pill packs? When is he coming back to clinic?    MICK Chavarria

## 2019-12-26 ENCOUNTER — OFFICE VISIT (OUTPATIENT)
Dept: ORTHOPEDICS | Facility: CLINIC | Age: 69
End: 2019-12-26
Payer: MEDICARE

## 2019-12-26 VITALS — WEIGHT: 155 LBS | BODY MASS INDEX: 24.91 KG/M2 | HEIGHT: 66 IN

## 2019-12-26 DIAGNOSIS — R29.898 RIGHT ARM WEAKNESS: Primary | ICD-10-CM

## 2019-12-26 DIAGNOSIS — R20.0 NUMBNESS OF RIGHT HAND: ICD-10-CM

## 2019-12-26 PROCEDURE — 99213 PR OFFICE/OUTPT VISIT, EST, LEVL III, 20-29 MIN: ICD-10-PCS | Mod: 25,S$GLB,, | Performed by: ORTHOPAEDIC SURGERY

## 2019-12-26 PROCEDURE — 99213 OFFICE O/P EST LOW 20 MIN: CPT | Mod: 25,S$GLB,, | Performed by: ORTHOPAEDIC SURGERY

## 2019-12-26 PROCEDURE — 20526 PR INJECT CARPAL TUNNEL: ICD-10-PCS | Mod: RT,S$GLB,, | Performed by: ORTHOPAEDIC SURGERY

## 2019-12-26 PROCEDURE — 1159F MED LIST DOCD IN RCRD: CPT | Mod: S$GLB,,, | Performed by: ORTHOPAEDIC SURGERY

## 2019-12-26 PROCEDURE — 20526 THER INJECTION CARP TUNNEL: CPT | Mod: RT,S$GLB,, | Performed by: ORTHOPAEDIC SURGERY

## 2019-12-26 PROCEDURE — 99999 PR PBB SHADOW E&M-EST. PATIENT-LVL III: CPT | Mod: PBBFAC,,, | Performed by: ORTHOPAEDIC SURGERY

## 2019-12-26 PROCEDURE — 1125F AMNT PAIN NOTED PAIN PRSNT: CPT | Mod: S$GLB,,, | Performed by: ORTHOPAEDIC SURGERY

## 2019-12-26 PROCEDURE — 99999 PR PBB SHADOW E&M-EST. PATIENT-LVL III: ICD-10-PCS | Mod: PBBFAC,,, | Performed by: ORTHOPAEDIC SURGERY

## 2019-12-26 PROCEDURE — 1125F PR PAIN SEVERITY QUANTIFIED, PAIN PRESENT: ICD-10-PCS | Mod: S$GLB,,, | Performed by: ORTHOPAEDIC SURGERY

## 2019-12-26 PROCEDURE — 1159F PR MEDICATION LIST DOCUMENTED IN MEDICAL RECORD: ICD-10-PCS | Mod: S$GLB,,, | Performed by: ORTHOPAEDIC SURGERY

## 2019-12-26 RX ORDER — TRIAMCINOLONE ACETONIDE 40 MG/ML
20 INJECTION, SUSPENSION INTRA-ARTICULAR; INTRAMUSCULAR
Status: COMPLETED | OUTPATIENT
Start: 2019-12-26 | End: 2019-12-26

## 2019-12-26 RX ADMIN — TRIAMCINOLONE ACETONIDE 20 MG: 40 INJECTION, SUSPENSION INTRA-ARTICULAR; INTRAMUSCULAR at 02:12

## 2019-12-26 NOTE — PROGRESS NOTES
Subjective:      Patient ID: Neptali Gonzalez is a 69 y.o. male.  Chief Complaint: Pain and Swelling of the Right Hand      HPI  Neptali Gonzalez is a  69 y.o. male presenting today for follow up of right hand symptoms including mild stiffness and numbness in the right hand.  He reports that he is still having the same symptoms that he had previously  I had scheduled a nerve test but he had to miss the nerve test he reports set still having problems in the right hand would like to we order the nerve test the shoulder is better.    Review of patient's allergies indicates:   Allergen Reactions    Percocet [oxycodone-acetaminophen] Other (See Comments)     AMS on narcotics         Current Outpatient Medications   Medication Sig Dispense Refill    acetaminophen (TYLENOL) 500 MG tablet Take 1 tablet (500 mg total) by mouth every 6 (six) hours as needed for Pain. 90 tablet 0    aspirin (ECOTRIN) 81 MG EC tablet Take 1 tablet (81 mg total) by mouth once daily. 90 tablet 3    atorvastatin (LIPITOR) 40 MG tablet Take 1 tablet (40 mg total) by mouth once daily. 90 tablet 3    blood sugar diagnostic (ACCU-CHEK RODRIGO) Strp 1 strip by Misc.(Non-Drug; Combo Route) route once daily. 200 strip 3    cholecalciferol, vitamin D3, (VITAMIN D3) 125 mcg (5,000 unit) Tab Take 1 tablet (5,000 Units total) by mouth once daily. 90 tablet 3    flash glucose sensor Kit 1 application by Misc.(Non-Drug; Combo Route) route once daily. 1 kit 0    ketoconazole (NIZORAL) 2 % cream Apply topically once daily. toes 30 g 1    lancets Misc 1 lancet by Misc.(Non-Drug; Combo Route) route once daily at 6am. 200 each 3    lisinopril 10 MG tablet Take 1 tablet (10 mg total) by mouth once daily. 90 tablet 3    metFORMIN (GLUCOPHAGE-XR) 500 MG 24 hr tablet Take 1 tablet (500 mg total) by mouth once daily. 90 tablet 3    polyethylene glycol (GOLYTELY,NULYTELY) 236-22.74-6.74 -5.86 gram suspension As directed 4000 mL 0     No current  "facility-administered medications for this visit.        Past Medical History:   Diagnosis Date    CKD (chronic kidney disease)     Coronary artery disease 2002    stent    Diabetes mellitus     Hypertension     Kidney stones     MI (myocardial infarction)     Stroke        Past Surgical History:   Procedure Laterality Date    ARTHROSCOPIC REPAIR OF ROTATOR CUFF OF SHOULDER Right 11/7/2018    Procedure: ARTHROSCOPY WITH DISTAL CLAVICLE  EXCISION AND SUBACROMIAL  DECOMPRESSION;  Surgeon: Tuan Thurston Jr., MD;  Location: [facility];  Service: Orthopedics;  Laterality: Right;    CARDIAC SURGERY      CORONARY STENT PLACEMENT      [facility] stent 2007      LITHOTRIPSY      TONSILLECTOMY         OBJECTIVE:   PHYSICAL EXAM:  Height: 5' 6" (167.6 cm) Weight: 70.3 kg (155 lb)  Vitals:    12/26/19 1420   Weight: 70.3 kg (155 lb)   Height: 5' 6" (1.676 m)   PainSc:   6     Ortho/SPM Exam  Examination right hand there is some mild tenderness at the palm slight swelling he does have a little bit of mild spasticity in the hand Tinel sign mildly positive previous carpal tunnel incision noted sensation decreased a decreased in all fingers and there is some weakness as well    RADIOGRAPHS:  None  Comments: I have personally reviewed the imaging and I agree with the above radiologist's report.    ASSESSMENT/PLAN:     IMPRESSION:  Possible neurologic issue right hand    PLAN:  I do not think this is carpal tunnel syndrome but he would like an injection today  After pause for time-out identified the right wrist injected right carpal tunnel with combination Kenalog 20 mg 0.5 cc xylocaine sterile technique  Tolerated the procedure well without complication  I have also reordered the nerve test with the right arm      FOLLOW UP:  After the nerve test is complete    Disclaimer: This note has been generated using voice-recognition software. There may be typographical errors that have been missed during proof-reading.  "

## 2019-12-29 ENCOUNTER — PATIENT OUTREACH (OUTPATIENT)
Dept: ADMINISTRATIVE | Facility: OTHER | Age: 69
End: 2019-12-29

## 2020-01-02 ENCOUNTER — TELEPHONE (OUTPATIENT)
Dept: PRIMARY CARE CLINIC | Facility: CLINIC | Age: 70
End: 2020-01-02

## 2020-01-02 NOTE — TELEPHONE ENCOUNTER
Rec'd call stating pt only has enough meds for 14 days. Appt was scheduled for thurs 1/9/20 @ 0730. He said he has a ride for that day.     ----- Message from Mayela Deshpande sent at 1/2/2020 10:29 AM CST -----  Contact: [phone]  Patient is requesting a call from the office in regards to his medications.  He has a few questions.  Please advise thanks

## 2020-01-02 NOTE — TELEPHONE ENCOUNTER
You  Gini Contreras LPN 1 minute ago (3:47 PM)      Please keep him on 1/9 given his transportation limitations (is he still in [place]?). Please place nurse reminder to call him on 1/8 and remind him! He has high no-show rate.     Let's wait until the appointment before requesting that Ray do pill packs. He may need med changes.     KJ    Routing comment

## 2020-01-03 NOTE — TELEPHONE ENCOUNTER
LM for pt to return a call to us regarding his 1/9 appt. Placed pt on 1/8 nurse sched for reminder call.

## 2020-01-08 ENCOUNTER — TELEPHONE (OUTPATIENT)
Dept: PRIMARY CARE CLINIC | Facility: CLINIC | Age: 70
End: 2020-01-08

## 2020-01-09 ENCOUNTER — OFFICE VISIT (OUTPATIENT)
Dept: PRIMARY CARE CLINIC | Facility: CLINIC | Age: 70
End: 2020-01-09
Payer: MEDICARE

## 2020-01-09 VITALS
HEIGHT: 66 IN | WEIGHT: 156 LBS | SYSTOLIC BLOOD PRESSURE: 112 MMHG | DIASTOLIC BLOOD PRESSURE: 80 MMHG | HEART RATE: 62 BPM | BODY MASS INDEX: 25.07 KG/M2 | OXYGEN SATURATION: 97 %

## 2020-01-09 DIAGNOSIS — Z59.00 HOMELESS: ICD-10-CM

## 2020-01-09 DIAGNOSIS — I70.0 AORTIC ARCH ATHEROSCLEROSIS: ICD-10-CM

## 2020-01-09 DIAGNOSIS — E11.8 TYPE 2 DIABETES MELLITUS WITH COMPLICATION, WITHOUT LONG-TERM CURRENT USE OF INSULIN: Primary | ICD-10-CM

## 2020-01-09 DIAGNOSIS — I77.819 ECTATIC AORTA: ICD-10-CM

## 2020-01-09 DIAGNOSIS — I69.351 HEMIPLEGIA AND HEMIPARESIS FOLLOWING CEREBRAL INFARCTION AFFECTING RIGHT DOMINANT SIDE: ICD-10-CM

## 2020-01-09 DIAGNOSIS — R41.89 COGNITIVE IMPAIRMENT: ICD-10-CM

## 2020-01-09 DIAGNOSIS — I15.2 HYPERTENSION ASSOCIATED WITH DIABETES: ICD-10-CM

## 2020-01-09 DIAGNOSIS — I77.1 ILIAC ARTERY STENOSIS, RIGHT: ICD-10-CM

## 2020-01-09 DIAGNOSIS — E11.69 DYSLIPIDEMIA WITH LOW HIGH DENSITY LIPOPROTEIN (HDL) CHOLESTEROL WITH HYPERTRIGLYCERIDEMIA DUE TO TYPE 2 DIABETES MELLITUS: ICD-10-CM

## 2020-01-09 DIAGNOSIS — E11.59 TYPE 2 DIABETES MELLITUS WITH OTHER CIRCULATORY COMPLICATION, UNSPECIFIED WHETHER LONG TERM INSULIN USE: ICD-10-CM

## 2020-01-09 DIAGNOSIS — M46.92 UNSPECIFIED INFLAMMATORY SPONDYLOPATHY, CERVICAL REGION: ICD-10-CM

## 2020-01-09 DIAGNOSIS — E78.2 DYSLIPIDEMIA WITH LOW HIGH DENSITY LIPOPROTEIN (HDL) CHOLESTEROL WITH HYPERTRIGLYCERIDEMIA DUE TO TYPE 2 DIABETES MELLITUS: ICD-10-CM

## 2020-01-09 DIAGNOSIS — D69.2 SENILE PURPURA: ICD-10-CM

## 2020-01-09 DIAGNOSIS — R20.0 NUMBNESS OF RIGHT HAND: ICD-10-CM

## 2020-01-09 DIAGNOSIS — R48.2 APRAXIA: ICD-10-CM

## 2020-01-09 DIAGNOSIS — E11.59 HYPERTENSION ASSOCIATED WITH DIABETES: ICD-10-CM

## 2020-01-09 LAB — GLUCOSE SERPL-MCNC: 160 MG/DL (ref 70–110)

## 2020-01-09 PROCEDURE — 3079F DIAST BP 80-89 MM HG: CPT | Mod: HCNC,CPTII,S$GLB, | Performed by: INTERNAL MEDICINE

## 2020-01-09 PROCEDURE — 1101F PT FALLS ASSESS-DOCD LE1/YR: CPT | Mod: HCNC,CPTII,S$GLB, | Performed by: INTERNAL MEDICINE

## 2020-01-09 PROCEDURE — 1126F AMNT PAIN NOTED NONE PRSNT: CPT | Mod: HCNC,S$GLB,, | Performed by: INTERNAL MEDICINE

## 2020-01-09 PROCEDURE — 82962 POCT GLUCOSE, HAND-HELD DEVICE: ICD-10-PCS | Mod: HCNC,,, | Performed by: INTERNAL MEDICINE

## 2020-01-09 PROCEDURE — 1159F MED LIST DOCD IN RCRD: CPT | Mod: HCNC,S$GLB,, | Performed by: INTERNAL MEDICINE

## 2020-01-09 PROCEDURE — 1126F PR PAIN SEVERITY QUANTIFIED, NO PAIN PRESENT: ICD-10-PCS | Mod: HCNC,S$GLB,, | Performed by: INTERNAL MEDICINE

## 2020-01-09 PROCEDURE — 1159F PR MEDICATION LIST DOCUMENTED IN MEDICAL RECORD: ICD-10-PCS | Mod: HCNC,S$GLB,, | Performed by: INTERNAL MEDICINE

## 2020-01-09 PROCEDURE — 99215 OFFICE O/P EST HI 40 MIN: CPT | Mod: HCNC,S$GLB,, | Performed by: INTERNAL MEDICINE

## 2020-01-09 PROCEDURE — 3079F PR MOST RECENT DIASTOLIC BLOOD PRESSURE 80-89 MM HG: ICD-10-PCS | Mod: HCNC,CPTII,S$GLB, | Performed by: INTERNAL MEDICINE

## 2020-01-09 PROCEDURE — 99215 PR OFFICE/OUTPT VISIT, EST, LEVL V, 40-54 MIN: ICD-10-PCS | Mod: HCNC,S$GLB,, | Performed by: INTERNAL MEDICINE

## 2020-01-09 PROCEDURE — 3074F PR MOST RECENT SYSTOLIC BLOOD PRESSURE < 130 MM HG: ICD-10-PCS | Mod: HCNC,CPTII,S$GLB, | Performed by: INTERNAL MEDICINE

## 2020-01-09 PROCEDURE — 3074F SYST BP LT 130 MM HG: CPT | Mod: HCNC,CPTII,S$GLB, | Performed by: INTERNAL MEDICINE

## 2020-01-09 PROCEDURE — 1101F PR PT FALLS ASSESS DOC 0-1 FALLS W/OUT INJ PAST YR: ICD-10-PCS | Mod: HCNC,CPTII,S$GLB, | Performed by: INTERNAL MEDICINE

## 2020-01-09 PROCEDURE — 82962 GLUCOSE BLOOD TEST: CPT | Mod: HCNC,,, | Performed by: INTERNAL MEDICINE

## 2020-01-09 RX ORDER — ASPIRIN 81 MG/1
81 TABLET ORAL DAILY
Qty: 90 TABLET | Refills: 3 | Status: SHIPPED | OUTPATIENT
Start: 2020-01-09

## 2020-01-09 SDOH — SOCIAL DETERMINANTS OF HEALTH (SDOH): HOMELESSNESS UNSPECIFIED: Z59.00

## 2020-01-09 NOTE — PATIENT INSTRUCTIONS
Today:   Referral to hand specialist.   Pills packed today   Return in 1 week for blood pressure check   Return in 1 month for follow up   Meet with Yessica to arrange food

## 2020-01-09 NOTE — ASSESSMENT & PLAN NOTE
R iliac artery stenosis seen on AA US in 5/2017, stent placed at [facility] in 2007  · Cardiology referral once acute issues addressed  · Continue tertiary prevention with APT, statin, BP control, DM control

## 2020-01-09 NOTE — ASSESSMENT & PLAN NOTE
· GFR at goal, A1c elevated but has weight stablesince last visit   · Continue metformin to 500mg XR   · Monitor kidney function q3 mos  · Continue ACE  · Pill packing to improve compliance

## 2020-01-09 NOTE — ASSESSMENT & PLAN NOTE
Newly diagnosed 5/5/17, A1c 9  · Tolerating metformin 500mg daily  · Podiatry exam completel  · Continue ASA, statin

## 2020-01-09 NOTE — PROGRESS NOTES
Primary Care Provider Appointment    Subjective:      Patient ID: Neptali Gonzalez is a 69 y.o. male with cognitive impairment, DM, HLD HTN    Chief Complaint: Follow-up (meds)  Patient has issues with hand that has been chronic for about 2 years. Worse in the last month. Reports decreased  strength and unable to write with it. Having to use his left hand to write. Had carpel tunnel surgery in the past. Did not improve after. Plans to see a hand specialist.     At previous visit patient had not been taking any of his meds for the last 3 months. Pt now reports taking all the pills in his pill pack. Not taking aspirin however.     Patient homeless. Living with friend and in [facility].       Had coronary stent placed in 2007 at [facility]. Needs APT and statin, is taking statin but not asa. Also has h/o stroke. Patient is high risk for ASCVD:  The 10-year ASCVD risk score (Momo GONG Jr., et al., 2013) is: 32.1%    Values used to calculate the score:      Age: 69 years      Sex: Male      Is Non- : No      Diabetic: Yes      Tobacco smoker: No      Systolic Blood Pressure: 112 mmHg      Is BP treated: Yes      HDL Cholesterol: 48 mg/dL      Total Cholesterol: 227 mg/dL    Lat A1c was 7.4 in 12/2019. POCT BG was 160 today. Eating two meals a day usually at home. Is requesting if we can set up meals for him.       Pills packed as follows:  Adherence packed for 28 days:  ASA 81mg  Atorvastatin 40 mg  Lisinopril 10 mg  Metformin  mg   Vitamin D3 5000 U.     Patient with notable memory issues today. MMSE performed by PRINCESS with this result: 22/30.    Past Surgical History:   Procedure Laterality Date    ARTHROSCOPIC REPAIR OF ROTATOR CUFF OF SHOULDER Right 11/7/2018    Procedure: ARTHROSCOPY WITH DISTAL CLAVICLE  EXCISION AND SUBACROMIAL  DECOMPRESSION;  Surgeon: Tuan Thurston Jr., MD;  Location: [facility];  Service: Orthopedics;  Laterality: Right;    CARDIAC SURGERY       CORONARY STENT PLACEMENT      [facility] stent 2007      LITHOTRIPSY      TONSILLECTOMY         Past Medical History:   Diagnosis Date    CKD (chronic kidney disease)     Coronary artery disease 2002    stent    Diabetes mellitus     Hypertension     Kidney stones     MI (myocardial infarction)     Stroke        Social History     Socioeconomic History    Marital status:      Spouse name: Not on file    Number of children: Not on file    Years of education: Not on file    Highest education level: Not on file   Occupational History    Not on file   Social Needs    Financial resource strain: Very hard    Food insecurity:     Worry: Often true     Inability: Often true    Transportation needs:     Medical: No     Non-medical: No   Tobacco Use    Smoking status: Former Smoker     Packs/day: 1.00     Years: 3.00     Pack years: 3.00     Start date: 1/1/1967    Smokeless tobacco: Never Used   Substance and Sexual Activity    Alcohol use: No    Drug use: No    Sexual activity: Yes     Partners: Male     Birth control/protection: Condom   Lifestyle    Physical activity:     Days per week: Not on file     Minutes per session: Not on file    Stress: Not on file   Relationships    Social connections:     Talks on phone: Not on file     Gets together: Not on file     Attends [employer] service: Not on file     Active member of club or organization: Not on file     Attends meetings of clubs or organizations: Not on file     Relationship status: Not on file   Other Topics Concern    Not on file   Social History Narrative    Not on file       Review of Systems   Constitutional: Positive for activity change and appetite change. Negative for fatigue and fever.   Respiratory: Negative for shortness of breath.    Cardiovascular: Negative for chest pain and leg swelling.   Musculoskeletal: Positive for arthralgias, back pain and joint swelling.   Skin: Negative for wound.   Neurological: Positive for  "tremors, weakness and numbness. Negative for dizziness, seizures and speech difficulty.   Hematological: Bruises/bleeds easily.   Psychiatric/Behavioral: Negative for confusion and decreased concentration. The patient is not nervous/anxious.        Objective:   /80   Pulse 62   Ht 5' 6" (1.676 m)   Wt 70.8 kg (155 lb 15.6 oz)   SpO2 97%   BMI 25.18 kg/m²     Physical Exam   Constitutional: He is oriented to person, place, and time. He appears well-developed.   Disheveled appearance   HENT:   Head: Normocephalic.   Eyes: EOM are normal.   Musculoskeletal: Normal range of motion.   Feet:   Right Foot:   Protective Sensation: 8 sites tested. 8 sites sensed.   Skin Integrity: Positive for dry skin. Negative for ulcer, blister or skin breakdown.   Left Foot:   Protective Sensation: 8 sites tested. 8 sites sensed.   Skin Integrity: Positive for dry skin. Negative for ulcer, blister or skin breakdown.   Neurological: He is alert and oriented to person, place, and time.   Mild resting tremor in R hand (improved since last exam)  Decreased short term memory   Skin: Skin is warm and dry.   Ecchymoses and purpura on UE bilaterally     Psychiatric:   disheveled appearance  Easily confused   Nursing note and vitals reviewed.      Lab Results   Component Value Date    WBC 7.16 07/10/2019    HGB 16.3 07/10/2019    HCT 47.8 07/10/2019     07/10/2019    CHOL 227 (H) 07/10/2019    TRIG 204 (H) 07/10/2019    HDL 48 07/10/2019    ALT 17 07/10/2019    AST 13 07/10/2019     12/09/2019    K 3.8 12/09/2019     12/09/2019    CREATININE 1.5 (H) 12/09/2019    BUN 14 12/09/2019    CO2 26 12/09/2019    TSH 0.803 07/10/2019    INR 1.1 05/25/2016    HGBA1C 7.4 (H) 12/09/2019       RESULTS: Reviewed labs and images today    Assessment:   69 y.o. male with multiple co-morbid illnesses here to continue work-up of chronic issues notably with cognitive impairment, DM, HLD HTN       Plan:     Problem List Items Addressed " This Visit        Neuro    Apraxia     Apraxia of R hand  · Follow-up Neuro  · Continue tertiary stroke prevention         Hemiplegia and hemiparesis following cerebral infarction affecting right dominant side     MRI L hemisphere infarction  · Continue teritary stroke prevention  · Continue OT and speech therapy  · F/U Neuro PRN         Cognitive impairment     Decreased short term memory, ability to process complex tasks. MMSE:  · MMSE 22/30, but has poor judgement  · Referred to neuropsych            Cardiac/Vascular    Dyslipidemia with low high density lipoprotein (HDL) cholesterol with hypertriglyceridemia due to type 2 diabetes mellitus     HDL 37, but ASCVD risk high, questionable compliance with atorvastatin 40mg  · Continue statin   · Decrease metformin 500mg once daily  · Is not eligible for the freestyle apolinar (verified with insurance company)  · Can not afford out of pocket  · Needs eye and foot monitoring         Relevant Medications    aspirin (ECOTRIN) 81 MG EC tablet    Aortic arch atherosclerosis     Aortic arch atherosclerosis on CTA head/neck on 5/5/17. Compliant with high intensity statin, ASA  · Continue statin, ASA  · Improve BP control  · Bring BP log to appts  · Consider cardiology consult once acute issues addressed         Ectatic aorta     Abdominal aorta ectasis seen on [facility] 5/2017  · Continue secondary prevention with APT, statin, BP, DM control  · Will refer to Cardiology in future         Iliac artery stenosis, right     R iliac artery stenosis seen on AA US in 5/2017, stent placed at [facility] in 2007  · Cardiology referral once acute issues addressed  · Continue tertiary prevention with APT, statin, BP control, DM control         Hypertension associated with diabetes     BP controlled on ACE  · Continue lisinopril 10mg  · Continue statin & ASA            Hematology    Senile purpura     Ecchymoses and pupura on UE bilaterally  · Continue APT  · Advised mindfulness of  movements            Endocrine    Uncontrolled secondary diabetes mellitus with stage 3 CKD (GFR 30-59)     · GFR at goal, A1c elevated but has weight stablesince last visit   · Continue metformin to 500mg XR   · Monitor kidney function q3 mos  · Continue ACE  · Pill packing to improve compliance         Type 2 diabetes mellitus with circulatory disorder     Newly diagnosed 5/5/17, A1c 9  · Tolerating metformin 500mg daily  · Podiatry exam completel  · Continue ASA, statin           Relevant Orders    POCT Glucose, Hand-Held Device (Completed)       Other    Numbness of right hand    Relevant Orders    Ambulatory referral to Hand Surgery      Other Visit Diagnoses     Type 2 diabetes mellitus with complication, without long-term current use of insulin    -  Primary    Relevant Medications    aspirin (ECOTRIN) 81 MG EC tablet    Homeless              Health Maintenance       Date Due Completion Date    TETANUS VACCINE 10/12/1967 ---    Shingles Vaccine (1 of 2) 10/12/1999 ---    Colonoscopy 10/12/1999 ---    Eye Exam 07/23/2019 7/23/2018    Override on 5/23/2017: Done (Eye Cam done)    Influenza Vaccine (1) 09/01/2019 11/5/2018- TODAY    Override on 10/6/2017: Done    Foot Exam 09/14/2019 9/14/2018 (Done)    Override on 9/14/2018: Done    Hemoglobin A1c 10/10/2019 7/10/2019- TODAY    Lipid Panel 07/10/2020 7/10/2019    High Dose Statin 08/30/2020 8/30/2019          Follow up in about 1 month (around 2/9/2020). Total face-to-face time was 60 min, 50% of this was spent on counseling and coordination of care. The following issues were discussed: with cognitive impairment, DM, HLD HTN    Delmis Walker MD  01/09/2020

## 2020-01-09 NOTE — ASSESSMENT & PLAN NOTE
Decreased short term memory, ability to process complex tasks. MMSE:  · MMSE 22/30, but has poor judgement  · Referred to neuropsych

## 2020-01-09 NOTE — ASSESSMENT & PLAN NOTE
HDL 37, but ASCVD risk high, questionable compliance with atorvastatin 40mg  · Continue statin   · Decrease metformin 500mg once daily  · Is not eligible for the freestyle apolinar (verified with insurance company)  · Can not afford out of pocket  · Needs eye and foot monitoring

## 2020-01-09 NOTE — PROGRESS NOTES
Adherence packed for 84 days:     3Morning cards:  Aspirin 81 mg  Atorvastatin 40 mg   Lisinopril 10 mg   Metformin  mg   Vitamin D3 5,000 IU

## 2020-01-14 ENCOUNTER — OUTPATIENT CASE MANAGEMENT (OUTPATIENT)
Dept: ADMINISTRATIVE | Facility: OTHER | Age: 70
End: 2020-01-14

## 2020-01-14 NOTE — PROGRESS NOTES
"Community Health Worker, Deanna Olivas, assistance requested from Yessica Philippe PRINCESS to assist this patient with [employer], SNAP and Medicaid applications.  Application completed with: [employer] and Medicaid applications are awaiting patients signature.   Location of meeting: Patient friends home; Graeme [place]".   Status of application: Pending   Follow up required: yes, to complete SNAP application.  Follow up Scheduled for: n/a     A follow up visit via phone call or home visit will be made to complete SNAP applications within the next week.   "

## 2020-01-16 NOTE — PROGRESS NOTES
Documentation:  [facility] Deanna LUCERO met with pt's friend Graeme Mantilla in [place], [place]. Pt previously provided consent for [facility] Graeme JEANETTE. hospitals DOMONIQUE and Deanna LUCERO Is assisting pt with finding an assisted living or Senior apartment complex for pt to reside. [facility] and [facility]W assisted Graeme REID with completing [facility] applications. W also assisted Graeme REID with completing SNAP application as well as well as a [place] Medicaid application. [facility] also provided Graeme REID with information regarding food pantries and assisted living facilities.

## 2020-01-17 ENCOUNTER — TELEPHONE (OUTPATIENT)
Dept: PRIMARY CARE CLINIC | Facility: CLINIC | Age: 70
End: 2020-01-17

## 2020-01-21 ENCOUNTER — PATIENT OUTREACH (OUTPATIENT)
Dept: ADMINISTRATIVE | Facility: OTHER | Age: 70
End: 2020-01-21

## 2020-01-21 NOTE — PROGRESS NOTES
Care Everywhere: updated  Immunization: updated  Health Maintenance: updated  Order placed: n/a  Upcoming appts:n/a

## 2020-01-30 ENCOUNTER — TELEPHONE (OUTPATIENT)
Dept: PRIMARY CARE CLINIC | Facility: CLINIC | Age: 70
End: 2020-01-30

## 2020-02-05 ENCOUNTER — OFFICE VISIT (OUTPATIENT)
Dept: PRIMARY CARE CLINIC | Facility: CLINIC | Age: 70
End: 2020-02-05
Payer: MEDICARE

## 2020-02-05 VITALS
WEIGHT: 149.13 LBS | SYSTOLIC BLOOD PRESSURE: 148 MMHG | HEART RATE: 89 BPM | DIASTOLIC BLOOD PRESSURE: 80 MMHG | HEIGHT: 66 IN | OXYGEN SATURATION: 98 % | BODY MASS INDEX: 23.97 KG/M2

## 2020-02-05 DIAGNOSIS — R41.82 ACUTE ALTERATION IN MENTAL STATUS: ICD-10-CM

## 2020-02-05 DIAGNOSIS — E11.59 HYPERTENSION ASSOCIATED WITH DIABETES: ICD-10-CM

## 2020-02-05 DIAGNOSIS — Z59.9 HOUSING OR ECONOMIC CIRCUMSTANCE: ICD-10-CM

## 2020-02-05 DIAGNOSIS — I15.2 HYPERTENSION ASSOCIATED WITH DIABETES: ICD-10-CM

## 2020-02-05 PROCEDURE — 3079F DIAST BP 80-89 MM HG: CPT | Mod: CPTII,S$GLB,, | Performed by: INTERNAL MEDICINE

## 2020-02-05 PROCEDURE — 1101F PT FALLS ASSESS-DOCD LE1/YR: CPT | Mod: CPTII,S$GLB,, | Performed by: INTERNAL MEDICINE

## 2020-02-05 PROCEDURE — 1159F MED LIST DOCD IN RCRD: CPT | Mod: S$GLB,,, | Performed by: INTERNAL MEDICINE

## 2020-02-05 PROCEDURE — 1125F AMNT PAIN NOTED PAIN PRSNT: CPT | Mod: S$GLB,,, | Performed by: INTERNAL MEDICINE

## 2020-02-05 PROCEDURE — 3077F PR MOST RECENT SYSTOLIC BLOOD PRESSURE >= 140 MM HG: ICD-10-PCS | Mod: CPTII,S$GLB,, | Performed by: INTERNAL MEDICINE

## 2020-02-05 PROCEDURE — 3077F SYST BP >= 140 MM HG: CPT | Mod: CPTII,S$GLB,, | Performed by: INTERNAL MEDICINE

## 2020-02-05 PROCEDURE — 3079F PR MOST RECENT DIASTOLIC BLOOD PRESSURE 80-89 MM HG: ICD-10-PCS | Mod: CPTII,S$GLB,, | Performed by: INTERNAL MEDICINE

## 2020-02-05 PROCEDURE — 99215 OFFICE O/P EST HI 40 MIN: CPT | Mod: S$GLB,,, | Performed by: INTERNAL MEDICINE

## 2020-02-05 PROCEDURE — 1101F PR PT FALLS ASSESS DOC 0-1 FALLS W/OUT INJ PAST YR: ICD-10-PCS | Mod: CPTII,S$GLB,, | Performed by: INTERNAL MEDICINE

## 2020-02-05 PROCEDURE — 99215 PR OFFICE/OUTPT VISIT, EST, LEVL V, 40-54 MIN: ICD-10-PCS | Mod: S$GLB,,, | Performed by: INTERNAL MEDICINE

## 2020-02-05 PROCEDURE — 1159F PR MEDICATION LIST DOCUMENTED IN MEDICAL RECORD: ICD-10-PCS | Mod: S$GLB,,, | Performed by: INTERNAL MEDICINE

## 2020-02-05 PROCEDURE — 1125F PR PAIN SEVERITY QUANTIFIED, PAIN PRESENT: ICD-10-PCS | Mod: S$GLB,,, | Performed by: INTERNAL MEDICINE

## 2020-02-05 SDOH — SOCIAL DETERMINANTS OF HEALTH (SDOH): PROBLEM RELATED TO HOUSING AND ECONOMIC CIRCUMSTANCES, UNSPECIFIED: Z59.9

## 2020-02-05 NOTE — PATIENT INSTRUCTIONS
TODAY:  - recheck BP  - proxy access to [facility]  - follow-up in 5 weeks   + will check BP at that appointment and change meds if we need to  - check sugar today  - disability tag

## 2020-02-05 NOTE — PROGRESS NOTES
Primary Care Provider Appointment  SHARED NOTE: Dr Ivette Deshpande (PGY-2), Dr Chacon (Attending)    Subjective:      Patient ID: Neptali Gonzalez is a 69 y.o. male with DM, HTN, s/p stroke    Chief Complaint: Follow-up and Hand Pain (right )    Has R hand pain x2 years. Numb. Sees Dr. Thurston, but is unable to reach him for follow-up. Also interested in seeing his physical medicine doctor again for pain management.    Diabetes - does not check sugars at home as he does not like needles, no hypoglycemic symptoms. Currently on metformin 500mg daily.    Has about 6 weeks left of his pill packs, would like them refilled at his next visit. Friend present during appointment reports that he misses doses, though patient himself denies this.     Currently living with a friend of a friend, Mr Morrell. Living situation has been doing well, friend cooks meals for him. Working on getting in an assisted living facility.    Yessica has been looking into [facility] for him.    Needs to see podiatry to address diabetic feet and toenails.    Needs a disability tag.    Patient is participating in pill packs:  Adherence packed for 84 days:      3Morning cards:  Aspirin 81 mg  Atorvastatin 40 mg   Lisinopril 10 mg   Metformin  mg   Vitamin D3 5,000 IU         Electronically signed by Andres Lozano PharmD at 1/9/2020  9:26 AM       Past Surgical History:   Procedure Laterality Date    ARTHROSCOPIC REPAIR OF ROTATOR CUFF OF SHOULDER Right 11/7/2018    Procedure: ARTHROSCOPY WITH DISTAL CLAVICLE  EXCISION AND SUBACROMIAL  DECOMPRESSION;  Surgeon: Tuan Thurston Jr., MD;  Location: [facility];  Service: Orthopedics;  Laterality: Right;    CARDIAC SURGERY      CORONARY STENT PLACEMENT      [facility] stent 2007      LITHOTRIPSY      TONSILLECTOMY         Past Medical History:   Diagnosis Date    CKD (chronic kidney disease)     Coronary artery disease 2002    stent    Diabetes mellitus      Hypertension     Kidney stones     MI (myocardial infarction)     Stroke        Social History     Socioeconomic History    Marital status:      Spouse name: Not on file    Number of children: Not on file    Years of education: Not on file    Highest education level: Not on file   Occupational History    Not on file   Social Needs    Financial resource strain: Very hard    Food insecurity:     Worry: Often true     Inability: Often true    Transportation needs:     Medical: No     Non-medical: No   Tobacco Use    Smoking status: Former Smoker     Packs/day: 1.00     Years: 3.00     Pack years: 3.00     Start date: 1/1/1967    Smokeless tobacco: Never Used   Substance and Sexual Activity    Alcohol use: No    Drug use: No    Sexual activity: Yes     Partners: Male     Birth control/protection: Condom   Lifestyle    Physical activity:     Days per week: Not on file     Minutes per session: Not on file    Stress: Not on file   Relationships    Social connections:     Talks on phone: Not on file     Gets together: Not on file     Attends Quaker service: Not on file     Active member of club or organization: Not on file     Attends meetings of clubs or organizations: Not on file     Relationship status: Not on file   Other Topics Concern    Not on file   Social History Narrative    Not on file       Review of Systems   Constitutional: Negative for activity change, chills and fever.   HENT: Negative for congestion, sinus pressure, sinus pain and sore throat.    Eyes: Negative for photophobia, pain and visual disturbance.   Respiratory: Negative for cough, shortness of breath and wheezing.    Cardiovascular: Negative for chest pain and leg swelling.   Gastrointestinal: Negative for abdominal pain, blood in stool, constipation, diarrhea, nausea and vomiting.   Genitourinary: Negative for dysuria, hematuria and urgency.   Musculoskeletal: Positive for gait problem (2/2 poor balance). Negative  "for arthralgias and myalgias.   Neurological: Positive for weakness (R hand) and numbness (R hand). Negative for dizziness, speech difficulty and light-headedness.   Psychiatric/Behavioral: Negative for agitation, behavioral problems, confusion and decreased concentration.       Objective:   BP (!) 148/80   Pulse 89   Ht 5' 6" (1.676 m)   Wt 67.7 kg (149 lb 2.3 oz)   SpO2 98%   BMI 24.07 kg/m²     Physical Exam   Constitutional: He is oriented to person, place, and time. He appears well-developed and well-nourished. No distress.   HENT:   Head: Atraumatic.   Mouth/Throat: No oropharyngeal exudate.   Eyes: Conjunctivae and EOM are normal. No scleral icterus.   Neck: Normal range of motion. No thyromegaly present.   Cardiovascular: Normal rate, regular rhythm, normal heart sounds and intact distal pulses.   No murmur heard.  Pulmonary/Chest: Effort normal and breath sounds normal. No respiratory distress. He has no wheezes.   Abdominal: Soft. Bowel sounds are normal. He exhibits no distension and no mass. There is no tenderness. There is no guarding.   Musculoskeletal: Normal range of motion. He exhibits no edema or tenderness.   Lymphadenopathy:     He has no cervical adenopathy.   Neurological: He is alert and oriented to person, place, and time. A sensory deficit (Numbness of R hand, 5/5 strength) is present.   Skin: Skin is warm and dry. He is not diaphoretic.   Psychiatric: He has a normal mood and affect. His behavior is normal. Judgment and thought content normal.   Vitals reviewed.          Lab Results   Component Value Date    WBC 7.16 07/10/2019    HGB 16.3 07/10/2019    HCT 47.8 07/10/2019     07/10/2019    CHOL 227 (H) 07/10/2019    TRIG 204 (H) 07/10/2019    HDL 48 07/10/2019    ALT 17 07/10/2019    AST 13 07/10/2019     12/09/2019    K 3.8 12/09/2019     12/09/2019    CREATININE 1.5 (H) 12/09/2019    BUN 14 12/09/2019    CO2 26 12/09/2019    TSH 0.803 07/10/2019    INR 1.1 " 05/25/2016    HGBA1C 7.4 (H) 12/09/2019       Current Outpatient Medications on File Prior to Visit   Medication Sig Dispense Refill    acetaminophen (TYLENOL) 500 MG tablet Take 1 tablet (500 mg total) by mouth every 6 (six) hours as needed for Pain. 90 tablet 0    aspirin (ECOTRIN) 81 MG EC tablet Take 1 tablet (81 mg total) by mouth once daily. 90 tablet 3    atorvastatin (LIPITOR) 40 MG tablet Take 1 tablet (40 mg total) by mouth once daily. 90 tablet 3    cholecalciferol, vitamin D3, (VITAMIN D3) 125 mcg (5,000 unit) Tab Take 1 tablet (5,000 Units total) by mouth once daily. 90 tablet 3    ketoconazole (NIZORAL) 2 % cream Apply topically once daily. toes 30 g 1    lisinopril 10 MG tablet Take 1 tablet (10 mg total) by mouth once daily. 90 tablet 3    metFORMIN (GLUCOPHAGE-XR) 500 MG 24 hr tablet Take 1 tablet (500 mg total) by mouth once daily. 90 tablet 3    polyethylene glycol (GOLYTELY,NULYTELY) 236-22.74-6.74 -5.86 gram suspension As directed 4000 mL 0    [DISCONTINUED] blood sugar diagnostic (ACCU-CHEK RODRIGO) Strp 1 strip by Misc.(Non-Drug; Combo Route) route once daily. 200 strip 3    [DISCONTINUED] flash glucose sensor Kit 1 application by Misc.(Non-Drug; Combo Route) route once daily. 1 kit 0    [DISCONTINUED] lancets Misc 1 lancet by Misc.(Non-Drug; Combo Route) route once daily at 6am. 200 each 3     No current facility-administered medications on file prior to visit.          Assessment:   69 y.o. male with multiple co-morbid illnesses here to establish care with new PCP and continue work-up of chronic issues notably  with DM, HTN, s/p stroke     Plan:     Problem List Items Addressed This Visit        Psychiatric    Housing or economic circumstance     Homeless, now living with a friend (Mr Steele)  · Advised assisted living  · SW following            Cardiac/Vascular    Hypertension associated with diabetes     BP elevated on ACE monotherapy  · Stopped CCB previously  · May need to  increase ACE  · BP recheck today  · May increase ACE at next PCP appt            Endocrine    Uncontrolled secondary diabetes mellitus with stage 3 CKD (GFR 30-59)     · GFR at goal, A1c elevated but has weight stablesince last visit   · Continue metformin to 500mg XR   · Monitor kidney function q3 mos  · Continue ACE  · Pill packing to improve compliance         Relevant Orders    POCT Glucose, Hand-Held Device       Other    Acute alteration in mental status     Patient recently lost his job, and was started on narcotics by dentist.  · Continues to have decreased mentation  · Advised supervised living situation               Health Maintenance       Date Due Completion Date    TETANUS VACCINE 10/12/1967 ---    Shingles Vaccine (1 of 2) 10/12/1999 ---    Colonoscopy 10/12/1999 ---    Eye Exam 07/23/2019 7/23/2018    Override on 5/23/2017: Done (Eye Cam done)    Hemoglobin A1c 03/09/2020 12/9/2019    Lipid Panel 07/10/2020 7/10/2019    Foot Exam 01/09/2021 1/9/2020    Override on 9/14/2018: Done    High Dose Statin 02/05/2021 2/5/2020          Follow up in about 5 weeks (around 3/11/2020). Total face-to-face time was 60 min, >50% of this was spent on counseling and coordination of care. The following issues were discussed:  with DM, HTN, s/p stroke      Ivette Deshpande M.D. PGY-2  [facility]  12:54 PM 2/5/2020  Pager [phone]    Karen Chacon MD/MPH  Internal Medicine  [facility]  [phone] spectralink

## 2020-02-05 NOTE — ASSESSMENT & PLAN NOTE
BP elevated on ACE monotherapy  · Stopped CCB previously  · May need to increase ACE  · BP recheck today  · May increase ACE at next PCP appt

## 2020-02-05 NOTE — ASSESSMENT & PLAN NOTE
Patient recently lost his job, and was started on narcotics by dentist.  · Continues to have decreased mentation  · Advised supervised living situation

## 2020-02-13 NOTE — PROGRESS NOTES
HPI     Mr. Neptali Gonzalez was referred by Karen Chacon MD for a   diabetic eye exam.    Patient reports no vision or ocular complaints.  Would patient like a refraction today? No     (-)drops  (-)flashes  (-)floaters  (-)diplopia    Diabetic yes   Hemoglobin A1C       Date                     Value               Ref Range             Status           06/25/2018               7.4 (H)             4.0 - 5.6 %         Final  11/27/2017               6.1 (H)             4.0 - 5.6 %         Final  08/22/2017               7.9 (H)             4.0 - 5.6 %         Final    OCULAR HISTORY  Last Eye Exam 07/18/17 with Dr. Cruz   (-)eye surgery   Cataracts OU  Dry eyes  Choroidal nevus OU    FAMILY HISTORY  (-)Glaucoma none       Last edited by Olivia Cruz, OD on 7/23/2018 11:04 AM. (History)            Assessment /Plan     For exam results, see Encounter Report.    Type 2 diabetes mellitus without retinopathy  Long term current use of oral hypoglycemic drug   No retinopathy noted OU. Monitor with yearly DFE.     Hollenhorst plaque, left eye   Not previously noted. Pt has dyslipidemia. Discussed diet changes, regular aerobic exercise, and taking all medications as prescribed. Continue 81mg aspirin daily. RTC with any vision changes.   Last CT Neck in May 2017 showed 50% carotid stenosis bilaterally.    Will notify PCP of findings.  -     Color Fundus Photography - OU - Both Eyes    Choroidal nevus, both eyes   Stable and quiet OU. Monitor.  -     Color Fundus Photography - OU - Both Eyes    Nuclear sclerotic cataract of both eyes   Cause of mildly reduced best-corrected visual acuity OD and OS, but asymptomatic. Monitor.      Bilateral dry eyes   Asymptomatic. Monitor.        RTC 1 year                 
Statement Selected

## 2020-02-28 NOTE — TELEPHONE ENCOUNTER
Multiple attempts have been made to contact patient to complete TCM call and confirm appt.  All attempts have been documented.  Patient has a hospital follow up scheduled with LILLIAN Banegas on 3/5 at 2:45 pm.  TCM is applicable.   Please let patient know that his A1c was uncontrolled (9), chlolesterol elevated, kidney function abnormal. I would like to increase the glipizide to 10mg daily. Make sure he is ok with that, new script sent to his pharmacy.    I want him to do the pill packing program, and he will need to come in early next week to meet with nurse to organize the meds. [facility]    Thanks,  KJ

## 2020-03-10 ENCOUNTER — TELEPHONE (OUTPATIENT)
Dept: ORTHOPEDICS | Facility: CLINIC | Age: 70
End: 2020-03-10

## 2020-03-10 ENCOUNTER — OFFICE VISIT (OUTPATIENT)
Dept: PRIMARY CARE CLINIC | Facility: CLINIC | Age: 70
End: 2020-03-10
Payer: MEDICARE

## 2020-03-10 VITALS
WEIGHT: 137.44 LBS | OXYGEN SATURATION: 90 % | HEIGHT: 66 IN | HEART RATE: 67 BPM | DIASTOLIC BLOOD PRESSURE: 60 MMHG | BODY MASS INDEX: 22.09 KG/M2 | SYSTOLIC BLOOD PRESSURE: 128 MMHG

## 2020-03-10 DIAGNOSIS — E55.9 HYPOVITAMINOSIS D: ICD-10-CM

## 2020-03-10 DIAGNOSIS — R20.0 NUMBNESS OF RIGHT HAND: ICD-10-CM

## 2020-03-10 DIAGNOSIS — E11.59 TYPE 2 DIABETES MELLITUS WITH OTHER CIRCULATORY COMPLICATION, UNSPECIFIED WHETHER LONG TERM INSULIN USE: Primary | ICD-10-CM

## 2020-03-10 PROCEDURE — 3078F PR MOST RECENT DIASTOLIC BLOOD PRESSURE < 80 MM HG: ICD-10-PCS | Mod: CPTII,S$GLB,, | Performed by: INTERNAL MEDICINE

## 2020-03-10 PROCEDURE — 1101F PR PT FALLS ASSESS DOC 0-1 FALLS W/OUT INJ PAST YR: ICD-10-PCS | Mod: CPTII,S$GLB,, | Performed by: INTERNAL MEDICINE

## 2020-03-10 PROCEDURE — 1159F PR MEDICATION LIST DOCUMENTED IN MEDICAL RECORD: ICD-10-PCS | Mod: S$GLB,,, | Performed by: INTERNAL MEDICINE

## 2020-03-10 PROCEDURE — 1159F MED LIST DOCD IN RCRD: CPT | Mod: S$GLB,,, | Performed by: INTERNAL MEDICINE

## 2020-03-10 PROCEDURE — 3074F PR MOST RECENT SYSTOLIC BLOOD PRESSURE < 130 MM HG: ICD-10-PCS | Mod: CPTII,S$GLB,, | Performed by: INTERNAL MEDICINE

## 2020-03-10 PROCEDURE — 3051F PR MOST RECENT HEMOGLOBIN A1C LEVEL 7.0 - < 8.0%: ICD-10-PCS | Mod: CPTII,S$GLB,, | Performed by: INTERNAL MEDICINE

## 2020-03-10 PROCEDURE — 3078F DIAST BP <80 MM HG: CPT | Mod: CPTII,S$GLB,, | Performed by: INTERNAL MEDICINE

## 2020-03-10 PROCEDURE — 3074F SYST BP LT 130 MM HG: CPT | Mod: CPTII,S$GLB,, | Performed by: INTERNAL MEDICINE

## 2020-03-10 PROCEDURE — 3051F HG A1C>EQUAL 7.0%<8.0%: CPT | Mod: CPTII,S$GLB,, | Performed by: INTERNAL MEDICINE

## 2020-03-10 PROCEDURE — 99215 OFFICE O/P EST HI 40 MIN: CPT | Mod: S$GLB,,, | Performed by: INTERNAL MEDICINE

## 2020-03-10 PROCEDURE — 99215 PR OFFICE/OUTPT VISIT, EST, LEVL V, 40-54 MIN: ICD-10-PCS | Mod: S$GLB,,, | Performed by: INTERNAL MEDICINE

## 2020-03-10 PROCEDURE — 1126F PR PAIN SEVERITY QUANTIFIED, NO PAIN PRESENT: ICD-10-PCS | Mod: S$GLB,,, | Performed by: INTERNAL MEDICINE

## 2020-03-10 PROCEDURE — 1101F PT FALLS ASSESS-DOCD LE1/YR: CPT | Mod: CPTII,S$GLB,, | Performed by: INTERNAL MEDICINE

## 2020-03-10 PROCEDURE — 1126F AMNT PAIN NOTED NONE PRSNT: CPT | Mod: S$GLB,,, | Performed by: INTERNAL MEDICINE

## 2020-03-10 NOTE — PATIENT INSTRUCTIONS
TODAY:  - labs on same day as eye doctor  - pill packs today  - help with housing from Yessica   + [facility]   + [employer]  - podiatry appt  - take [employer] tour (information given)

## 2020-03-10 NOTE — ASSESSMENT & PLAN NOTE
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  · Previously followed by Neuro  · Stable  · Continue statin, APT, BP control, DM control  · Appt with Dr Thurston  · Need nerve conduction study  · Will assist with scheduling today  · Message sent to Dr Thurston's pool

## 2020-03-10 NOTE — Clinical Note
Can you get this patient an appt with Dr Thurston? He has contractures and complications of stroke, now with hand pain.Thanks,KJ

## 2020-03-10 NOTE — PROGRESS NOTES
Primary Care Provider Appointment  SHARED NOTE: Patience Bro (MS4), Dr Chacon (Attending)    Subjective:      Patient ID: Neptali Gonzalez is a 69 y.o. male with DM, HTN, s/p stroke    Chief Complaint: Follow-up    Here for refill pill packs today for 90 days. His caretaker reports he has been compliant with his medications.     Has R hand pain x2 years. Numb. Sees Dr. Thurston, but is unable to reach him for follow-up, still wishing to do so.    He is wanting to know if he could go for a repeat stress echo as he is concerned that he has heart disease.  His last stress test was in 2008 at [facility] that he reports was normal.  His father had MI in 60s.  He denies CP, SOB, diaphoresis, palpitations or decrease in exercise tolerance. Patient has poor insight into his disease.    Diabetes - does not check sugars at home as he does not like needles, no hypoglycemic symptoms. Currently on metformin 500mg daily.    Currently living with a friend of a friend, Mr Steele. Living situation has been doing well, friend cooks meals for him. Working on getting in an assisted living facility. His caretaker is Mr. Graeme Mantilla. [phone].     Yessica has been looking into [facility] for him.  They have applied to multiple home's for him in the last 2 months with no response. They are open to nursing home placement.    Needs to see podiatry to address diabetic feet and toenails. His caretaker, Mr. Mantilla, is going to schedule an appointment with Dr. Francisco, podiatry.  He has an ophthalmology appointment on 3/24.    Patient is participating in pill packs:  Adherence packed for 84 days:      3 Morning cards:  Aspirin 81 mg  Atorvastatin 40 mg   Lisinopril 10 mg   Metformin  mg   Vitamin D3 5,000 IU         Electronically signed by Andres Lozano, PharmD at 1/9/2020  9:26 AM       Past Surgical History:   Procedure Laterality Date    ARTHROSCOPIC REPAIR OF ROTATOR CUFF OF SHOULDER Right  11/7/2018    Procedure: ARTHROSCOPY WITH DISTAL CLAVICLE  EXCISION AND SUBACROMIAL  DECOMPRESSION;  Surgeon: Tuan Thurston Jr., MD;  [facility];  Service: Orthopedics;  Laterality: Right;    CARDIAC SURGERY      CORONARY STENT PLACEMENT      illiac stent 2007      LITHOTRIPSY      TONSILLECTOMY         Past Medical History:   Diagnosis Date    CKD (chronic kidney disease)     Coronary artery disease 2002    stent    Diabetes mellitus     Hypertension     Kidney stones     MI (myocardial infarction)     Stroke        Social History     Socioeconomic History    Marital status:      Spouse name: Not on file    Number of children: Not on file    Years of education: Not on file    Highest education level: Not on file   Occupational History    Not on file   Social Needs    Financial resource strain: Very hard    Food insecurity:     Worry: Often true     Inability: Often true    Transportation needs:     Medical: No     Non-medical: No   Tobacco Use    Smoking status: Former Smoker     Packs/day: 1.00     Years: 3.00     Pack years: 3.00     Start date: 1/1/1967    Smokeless tobacco: Never Used   Substance and Sexual Activity    Alcohol use: No    Drug use: No    Sexual activity: Yes     Partners: Male     Birth control/protection: Condom   Lifestyle    Physical activity:     Days per week: Not on file     Minutes per session: Not on file    Stress: Not on file   Relationships    Social connections:     Talks on phone: Not on file     Gets together: Not on file     Attends Sabianist service: Not on file     Active member of club or organization: Not on file     Attends meetings of clubs or organizations: Not on file     Relationship status: Not on file   Other Topics Concern    Not on file   Social History Narrative    Not on file       Review of Systems   Constitutional: Negative for activity change, chills and fever.   HENT: Negative for congestion, sinus pressure, sinus  "pain and sore throat.    Eyes: Negative for photophobia, pain and visual disturbance.   Respiratory: Negative for cough, shortness of breath and wheezing.    Cardiovascular: Negative for chest pain and leg swelling.   Gastrointestinal: Negative for abdominal pain, blood in stool, constipation, diarrhea, nausea and vomiting.   Genitourinary: Negative for dysuria, hematuria and urgency.   Musculoskeletal: Positive for gait problem (2/2 poor balance). Negative for arthralgias and myalgias.   Neurological: Positive for weakness (R hand) and numbness (R hand). Negative for dizziness, speech difficulty and light-headedness.   Psychiatric/Behavioral: Negative for agitation, behavioral problems, confusion and decreased concentration.       Objective:   /60   Pulse 67   Ht 5' 6" (1.676 m)   Wt 62.4 kg (137 lb 7.3 oz)   SpO2 (!) 90%   BMI 22.19 kg/m²     Physical Exam   Constitutional: He is oriented to person, place, and time. He appears well-developed and well-nourished. No distress.   HENT:   Head: Atraumatic.   Mouth/Throat: No oropharyngeal exudate.   Eyes: Conjunctivae and EOM are normal. No scleral icterus.   Neck: Normal range of motion. No thyromegaly present.   Cardiovascular: Normal rate, regular rhythm, normal heart sounds and intact distal pulses.   No murmur heard.  Pulmonary/Chest: Effort normal and breath sounds normal. No respiratory distress. He has no wheezes.   Abdominal: Soft. Bowel sounds are normal. He exhibits no distension and no mass. There is no tenderness. There is no guarding.   Musculoskeletal: Normal range of motion. He exhibits no edema or tenderness.   Lymphadenopathy:     He has no cervical adenopathy.   Neurological: He is alert and oriented to person, place, and time. A sensory deficit (Numbness of R hand, 5/5 strength) is present.   Skin: Skin is warm and dry. He is not diaphoretic.   Psychiatric: He has a normal mood and affect. His behavior is normal. Judgment and thought " content normal.   Vitals reviewed.      Lab Results   Component Value Date    WBC 7.16 07/10/2019    HGB 16.3 07/10/2019    HCT 47.8 07/10/2019     07/10/2019    CHOL 227 (H) 07/10/2019    TRIG 204 (H) 07/10/2019    HDL 48 07/10/2019    ALT 17 07/10/2019    AST 13 07/10/2019     12/09/2019    K 3.8 12/09/2019     12/09/2019    CREATININE 1.5 (H) 12/09/2019    BUN 14 12/09/2019    CO2 26 12/09/2019    TSH 0.803 07/10/2019    INR 1.1 05/25/2016    HGBA1C 7.4 (H) 12/09/2019       Current Outpatient Medications on File Prior to Visit   Medication Sig Dispense Refill    acetaminophen (TYLENOL) 500 MG tablet Take 1 tablet (500 mg total) by mouth every 6 (six) hours as needed for Pain. 90 tablet 0    aspirin (ECOTRIN) 81 MG EC tablet Take 1 tablet (81 mg total) by mouth once daily. 90 tablet 3    atorvastatin (LIPITOR) 40 MG tablet Take 1 tablet (40 mg total) by mouth once daily. 90 tablet 3    cholecalciferol, vitamin D3, (VITAMIN D3) 125 mcg (5,000 unit) Tab Take 1 tablet (5,000 Units total) by mouth once daily. 90 tablet 3    ketoconazole (NIZORAL) 2 % cream Apply topically once daily. toes 30 g 1    lisinopril 10 MG tablet Take 1 tablet (10 mg total) by mouth once daily. 90 tablet 3    metFORMIN (GLUCOPHAGE-XR) 500 MG 24 hr tablet Take 1 tablet (500 mg total) by mouth once daily. 90 tablet 3    polyethylene glycol (GOLYTELY,NULYTELY) 236-22.74-6.74 -5.86 gram suspension As directed 4000 mL 0     No current facility-administered medications on file prior to visit.          Assessment:   69 y.o. 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  · Previously followed by Neuro  · Stable  · Continue statin, APT, BP control, DM control  · Appt with Dr Thurston  · Need nerve conduction study  · Will assist with scheduling today  · Message sent to Dr Thurston's pool               Health Maintenance       Date Due Completion Date    TETANUS VACCINE 10/12/1967 ---    Shingles Vaccine (1 of 2) 10/12/1999 ---    Colonoscopy 10/12/1999 ---    Eye Exam 07/23/2019 7/23/2018- UPCOMING    Override on 5/23/2017: Done (Eye Cam done)    Hemoglobin A1c 03/09/2020 12/9/2019- TODAY    Lipid Panel 07/10/2020 7/10/2019- TODAY    Foot Exam 01/09/2021 1/9/2020- RESCHEDULED    Override on 9/14/2018: Done    High Dose Statin 02/05/2021 2/5/2020          Follow up in about 4 weeks (around 4/7/2020). Total face-to-face time was 60 min, >50% of this was spent on counseling and coordination of care. The following issues were discussed:  with DM, HTN, s/p stroke    Patience Bro, MS4    Karen Chacon MD/MPH  Internal Medicine  [facility]  [phone] [facility]

## 2020-03-17 ENCOUNTER — TELEPHONE (OUTPATIENT)
Dept: PRIMARY CARE CLINIC | Facility: CLINIC | Age: 70
End: 2020-03-17

## 2020-03-17 NOTE — TELEPHONE ENCOUNTER
----- Message from Patience Bro sent at 3/17/2020  9:20 AM CDT -----  He has not heard back about any of the nursing home placements.  Mr. Mantilla is in [place] until Friday and will call.    Pt Advanced Directive status: Not on file. Will ask Mr. Mantilla about this as well.    He does not have access to [facility].  Will call Mr. Mantilla on Friday to set this up.     Pt currently has pill packs.  Will need refill in 84 days.

## 2020-03-25 ENCOUNTER — TELEPHONE (OUTPATIENT)
Dept: PRIMARY CARE CLINIC | Facility: CLINIC | Age: 70
End: 2020-03-25

## 2020-03-25 DIAGNOSIS — R41.89 COGNITIVE IMPAIRMENT: ICD-10-CM

## 2020-03-25 DIAGNOSIS — R41.82 ACUTE ALTERATION IN MENTAL STATUS: ICD-10-CM

## 2020-03-25 DIAGNOSIS — Z02.2 ENCOUNTER FOR EXAMINATION FOR ADMISSION TO NURSING HOME: Primary | ICD-10-CM

## 2020-03-25 DIAGNOSIS — I69.351 HEMIPLEGIA AND HEMIPARESIS FOLLOWING CEREBRAL INFARCTION AFFECTING RIGHT DOMINANT SIDE: ICD-10-CM

## 2020-03-25 DIAGNOSIS — R48.2 APRAXIA: ICD-10-CM

## 2020-03-25 DIAGNOSIS — F02.818 DEMENTIA ASSOCIATED WITH OTHER UNDERLYING DISEASE WITH BEHAVIORAL DISTURBANCE: ICD-10-CM

## 2020-03-25 NOTE — TELEPHONE ENCOUNTER
----- Message from Yessica Philippe LMSW sent at 3/25/2020 10:57 AM CDT -----  Contact: Yessica Philippe LMSW  Good morning,  I spoke with pt's friend, Graeme BLACKBURN, regarding nursing home placement. Graeme is in agreement with long term nursing home placement at [facility] for pt. I will facilitate nursing home referral.    Respectfully,    Yessica SHINE

## 2020-03-25 NOTE — TELEPHONE ENCOUNTER
Yessica- great! Please scan/email any forms you need me to sign. I also just set up a docu-sign account through my [facility] email if you do that.    Thanks,  DOMONIQUE Frederick Staff; Karen Chacon MD   Caller: Yessica Philippe LMSW (Today, 10:57 AM)             Good morning,   I spoke with pt's friend, Graeme BLACKBURN, regarding nursing home placement. Graeme is in agreement with long-term nursing home placement at [facility] for pt. I will facilitate nursing home referral.     Respectfully,     Yessica SHINE

## 2020-03-26 NOTE — TELEPHONE ENCOUNTER
Spoke w/ Amirahraghav he stated he can come in at anytime next week I advised I will see when you want pt to come in and Yessica is working from home at the moment

## 2020-03-26 NOTE — TELEPHONE ENCOUNTER
Please ask Mr Ponce when they want to come in for CXR and NH admission assessment?    Advise Mr Ponce that Mr Gonzalez is going to continue to decline related to his dementia. Eventually he will be appropriate for hospice. There is nothing that can be done to stop this process as it is related to his previous stroke and progressive dementia.    Thanks,  KJ

## 2020-03-26 NOTE — TELEPHONE ENCOUNTER
----- Message from Patience Bro sent at 3/26/2020 12:25 PM CDT -----  Talked with Mr. Mantilla today. He's concerned about Mr. Gonzalez, thinks he may be depressed.  Mr. Gonzalez has not been eating very much, spending all day sitting on the couch and not hygienically taking care of himself.  Unable to use his R hand properly since his stroke. Expresses that he needs a caretaker.     He talked to Yessica yesterday and they are looking at [facility]. Mr. Gonzalez is in agreement with this plan.  A requirement of this facility requires an in-person visit with Dr. BARTON and chest XR.

## 2020-03-26 NOTE — TELEPHONE ENCOUNTER
Magdalena- Please schedule patient for XRay and quant gold for NH admission on Monday after 11am.    Yessica- where are we with NH admission? I am thinking of adding hospice to his admission. He is declining rapidly. What is the hospice agency that [facility] partners with?    Thanks,  KJ

## 2020-03-27 ENCOUNTER — TELEPHONE (OUTPATIENT)
Dept: PRIMARY CARE CLINIC | Facility: CLINIC | Age: 70
End: 2020-03-27

## 2020-03-27 PROBLEM — F02.818 DEMENTIA ASSOCIATED WITH OTHER UNDERLYING DISEASE WITH BEHAVIORAL DISTURBANCE: Status: ACTIVE | Noted: 2020-03-27

## 2020-03-27 NOTE — TELEPHONE ENCOUNTER
Spoke w/ care taker he stated him and Yessica was on three way with hospice they stated that he will be admitted in 1 week but pt is not in agreement the care taker will try to convince him but he is unsure at the moment. advised not to send to the ED

## 2020-03-27 NOTE — TELEPHONE ENCOUNTER
Hospice ordered with [facility].    Magdalena- can you fax the orders, face sheet and last note  to [facility]? Please let his caretaker know (Mr Tate) that hospice will be reaching out. Explain to him that this is the service I wanted to get on board for Mr Gonzalez since he is declining so rapidly. Also educate on callign us and NOT GOING TO ED now for anything.    Yessica- do you know when he may be admitted to [facility]? I am getting started on the hospice order now.    Thanks,  KJ

## 2020-03-27 NOTE — TELEPHONE ENCOUNTER
Been calling pt and pt care taker since this morning no answer, I doubt he will answer for hospice but I am faxing everything over now

## 2020-03-30 ENCOUNTER — TELEPHONE (OUTPATIENT)
Dept: NEUROLOGY | Facility: CLINIC | Age: 70
End: 2020-03-30

## 2020-04-01 ENCOUNTER — TELEPHONE (OUTPATIENT)
Dept: PRIMARY CARE CLINIC | Facility: CLINIC | Age: 70
End: 2020-04-01

## 2020-04-02 NOTE — TELEPHONE ENCOUNTER
----- Message from Yessica Philippe LMSW sent at 4/2/2020 12:49 PM CDT -----  Contact: Yessica SHINE   Good afternoon,  I left a voicemail message for pt's friend, Graeme BLACKBURN, requesting a return call regarding pt's status.    Respectfully,    Yessica SHINE

## 2020-04-06 ENCOUNTER — TELEPHONE (OUTPATIENT)
Dept: PRIMARY CARE CLINIC | Facility: CLINIC | Age: 70
End: 2020-04-06

## 2020-04-06 DIAGNOSIS — R41.82 ACUTE ALTERATION IN MENTAL STATUS: ICD-10-CM

## 2020-04-06 DIAGNOSIS — F02.818 DEMENTIA ASSOCIATED WITH OTHER UNDERLYING DISEASE WITH BEHAVIORAL DISTURBANCE: Primary | ICD-10-CM

## 2020-04-06 DIAGNOSIS — R48.2 APRAXIA: ICD-10-CM

## 2020-04-06 DIAGNOSIS — I69.351 HEMIPLEGIA AND HEMIPARESIS FOLLOWING CEREBRAL INFARCTION AFFECTING RIGHT DOMINANT SIDE: ICD-10-CM

## 2020-04-06 NOTE — TELEPHONE ENCOUNTER
Hospice ordered with [facility]. Please fax the order, face sheet, and last clinic note.    Thanks,  KJ

## 2020-04-06 NOTE — TELEPHONE ENCOUNTER
"----- Message from Yessica Philippe LMSW sent at 4/3/2020  3:33 PM CDT -----  Contact: Yessica Philippe  Good afternoon,  I spoke with pt's friend, Mr. Graeme BLACKBURN, Mr. Bedolla reports that he spoke with pt's roommate Cedric and he is agreeable with hospice services in the home for pt. Mr. Graeme REID reports that Cedric informed him that Mr. Gonzalez attempted to put his clothes in the oven to dry them. Mr. Graeme BLACKBURN also reports that Cedric informed him that pt has not taken a bath/shower in three months. Graeme BLACKBURN informed me that Cedric reported that when pt when to the bathroom to take a shower Cedric inspected the towels and they were "dry not even damp." Mr. Graeme BLACKBURN informed me that he thinks that pt could have taken a bath/shower and dried himself off using a hairdryer. I believe that this is highly unlikely due to pt's lack of flexibility and dexterity. I informed Keyona with [facility]'s hospice that they are agreeable with receiving home hospice services but Ms. Rand reports that she is unable to accept pt as a home hospice pt at this time. Mr. Graeme BLACKBURN is in agreement with using a company of your choice for hospice services.     Respectfully,    Yessica SHINE"
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
## 2020-04-06 NOTE — TELEPHONE ENCOUNTER
Please encrypt email the information (referral, face sheet, last clinic note, labs) to [email or web address] Fidelia with [facility]'s hospice.    Thanks,  KJ

## 2020-04-06 NOTE — TELEPHONE ENCOUNTER
Information faxed, but [facility] refused patient unless he is placed in NH. Patient is refusing.    Referral faxed to [facility].    Karen Chacon MD/MPH  Internal Medicine  [facility]  [phone] [facility]

## 2020-04-07 ENCOUNTER — PATIENT MESSAGE (OUTPATIENT)
Dept: NEUROLOGY | Facility: CLINIC | Age: 70
End: 2020-04-07

## 2020-04-07 NOTE — TELEPHONE ENCOUNTER
----- Message from Patience Bro sent at 4/6/2020  6:01 PM CDT -----  Neither him nor Mr. Mantilla answered.  Will try again in the morning.

## 2020-04-08 ENCOUNTER — TELEPHONE (OUTPATIENT)
Dept: PRIMARY CARE CLINIC | Facility: CLINIC | Age: 70
End: 2020-04-08

## 2020-04-08 ENCOUNTER — OFFICE VISIT (OUTPATIENT)
Dept: PRIMARY CARE CLINIC | Facility: CLINIC | Age: 70
End: 2020-04-08
Payer: MEDICARE

## 2020-04-08 DIAGNOSIS — F02.818 DEMENTIA ASSOCIATED WITH OTHER UNDERLYING DISEASE WITH BEHAVIORAL DISTURBANCE: ICD-10-CM

## 2020-04-08 DIAGNOSIS — Z66 DNR (DO NOT RESUSCITATE): ICD-10-CM

## 2020-04-08 PROCEDURE — 99443 PR PHYSICIAN TELEPHONE EVALUATION 21-30 MIN: CPT | Mod: 95,GW,, | Performed by: INTERNAL MEDICINE

## 2020-04-08 PROCEDURE — 99443 PR PHYSICIAN TELEPHONE EVALUATION 21-30 MIN: ICD-10-PCS | Mod: 95,GW,, | Performed by: INTERNAL MEDICINE

## 2020-04-08 NOTE — PROGRESS NOTES
[facility] Provider Telephone Check-In Call During COVID Crisis      The patient location is:  Patient Home with caretaker  The chief complaint leading to consultation is: dementia, placement  Total time spent with patient: 30m    Visit type: Telephone call with synchronous audio only     This visit was completed via telephone due to the restrictions of the COVID-19 pandemic. All issues as below were discussed and addressed but no physical exam was performed. If it was felt that the patient should be evaluated in clinic then they were directed there. The patient verbally consented to visit.      Subjective:      Patient ID: Neptali Gonzalez is a 69 y.o. male with dementia, placement    Chief Complaint: dementia, DM    Patient's risk score is 31%. Patient is high risk for poor outcomes with COVID due to the following chronic conditions advanced age, dementia, DM, low functional status.    Patient is unable to participate in a virtual visit due to numerous barriers to care including dementia.    Patient was admitted to [facility] with cervical vascular disorder. Em [phone] is nurse contact, and Taryn is .    Patient is now living with caretaker's niece (Skyler Garcia) on the  and is providing full time care for him.    Advance Care Planning     Advance Care Planning     Power of   I initiated the process of advance care planning today and explained the importance of this process to the patient's caretaker.  I introduced the concept of advance directives to the patient, as well. Then the caretaker received detailed information about the importance of designating a Health Care Power of  (HCPOA).          Code Status  In light of the patients advanced and life limiting illness,I engaged the the family in a conversation about the patient's preferences for care  at the very end of life. The patient wishes to have a natural, peaceful death.  Along those lines, the patient  does not wish to have CPR or other invasive treatments performed when his heart and/or breathing stops. I communicated to the family that a DNR order would be placed in his medical record to reflect this preference.  I spent a total of 10 minutes engaging the patient in this advance care planning discussion.                Objective:     Lab Results   Component Value Date    WBC 7.16 07/10/2019    HGB 16.3 07/10/2019    HCT 47.8 07/10/2019     07/10/2019    CHOL 227 (H) 07/10/2019    TRIG 204 (H) 07/10/2019    HDL 48 07/10/2019    ALT 17 07/10/2019    AST 13 07/10/2019     12/09/2019    K 3.8 12/09/2019     12/09/2019    CREATININE 1.5 (H) 12/09/2019    BUN 14 12/09/2019    CO2 26 12/09/2019    TSH 0.803 07/10/2019    INR 1.1 05/25/2016    HGBA1C 7.4 (H) 12/09/2019         Assessment:   69 y.o. male with multiple co-morbid illnesses here to continue work-up of chronic issues notably dementia, placement.     Plan:     Problem List Items Addressed This Visit        Neuro    Dementia associated with other underlying disease with behavioral disturbance     Worsening mental status and unable to perform ADLs  · Advised supportive care  · Enrolled in hospice            Palliative Care    DNR (do not resuscitate)     DNR/DNI, patient is enrolled in hospice. [facility] with cervical vascular disorder. Em [phone] is nurse contact, and Taryn is   · Advised to assign Graeme Tee or Skyler Garcia               Health Maintenance       Date Due Completion Date    TETANUS VACCINE 10/12/1967 ---    Shingles Vaccine (1 of 2) 10/12/1999 ---    Colonoscopy 10/12/1999 ---    Eye Exam 07/23/2019 7/23/2018    Override on 5/23/2017: Done (Eye Cam done)    Hemoglobin A1c 03/09/2020 12/9/2019    Lipid Panel 07/10/2020 7/10/2019    Foot Exam 01/09/2021 1/9/2020    Override on 9/14/2018: Done    High Dose Statin 03/10/2021 3/10/2020          Follow up in about 2 months (around 6/8/2020). Thirty  minutes spent with this patient today, including addressing advanced care planning.     Karen Chacon MD/MPH  Internal Medicine  [facility]  [phone]

## 2020-04-08 NOTE — ASSESSMENT & PLAN NOTE
DNR/DNI, patient is enrolled in hospice. [facility] with cervical vascular disorder. Em [phone] is nurse contact, and Taryn is   · Advised to assign Graeme Tee or Skyler Garcia

## 2020-04-08 NOTE — ASSESSMENT & PLAN NOTE
Worsening mental status and unable to perform ADLs  · Advised supportive care  · Enrolled in hospice

## 2020-04-08 NOTE — TELEPHONE ENCOUNTER
----- Message from Patience Bro sent at 4/8/2020  8:30 AM CDT -----  Olman Nichols, per Dr. BARTON can you schedule a call with Bree Annalee () today .
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
## 2020-04-13 ENCOUNTER — TELEPHONE (OUTPATIENT)
Dept: PODIATRY | Facility: CLINIC | Age: 70
End: 2020-04-13

## 2020-05-14 ENCOUNTER — TELEPHONE (OUTPATIENT)
Dept: PRIMARY CARE CLINIC | Facility: CLINIC | Age: 70
End: 2020-05-14

## 2020-05-14 NOTE — TELEPHONE ENCOUNTER
Please get more info on his symptoms. He may need in-home COVID testing. We can arrange that or through the drive through.    Thanks,  KJ

## 2020-05-14 NOTE — TELEPHONE ENCOUNTER
----- Message from Liz Rendon sent at 5/14/2020  8:48 AM CDT -----  Contact: Sherry Rendon MS4  Pt. Called the clinic asking what meds he can take for cold symptoms. States he believes he has the common cold and would like relief.

## 2020-05-20 ENCOUNTER — TELEPHONE (OUTPATIENT)
Dept: PRIMARY CARE CLINIC | Facility: CLINIC | Age: 70
End: 2020-05-20

## 2020-05-20 NOTE — TELEPHONE ENCOUNTER
Spoke with graciela, he stated pt is not having covid symptoms and doing well with hospice.    Sarina is taking care of pt. Sarina is annita's niece.   Sarina is [address] [place] [address], [phone]    please call sarina for anything for pt needs.     Updated phone and address.

## 2020-05-20 NOTE — TELEPHONE ENCOUNTER
----- Message from Magdalena Stephens MA sent at 5/20/2020  2:40 PM CDT -----  Contact: Graeme Stevens [phone]      ----- Message -----  From: Paul Dial  Sent: 5/20/2020   2:06 PM CDT  To: Oswaldo Lindsay Staff    Patient is returning a phone call.  Who left a message for the patient:  office   Does patient know what this is regarding:    Comments: Graeme Stevens [phone]    Please call an advise  Thank you

## 2020-05-29 ENCOUNTER — HOSPITAL ENCOUNTER (EMERGENCY)
Facility: HOSPITAL | Age: 70
Discharge: HOME | End: 2020-05-30
Attending: EMERGENCY MEDICINE
Payer: MEDICARE

## 2020-05-29 VITALS
TEMPERATURE: 98 F | OXYGEN SATURATION: 99 % | SYSTOLIC BLOOD PRESSURE: 185 MMHG | BODY MASS INDEX: 24.21 KG/M2 | WEIGHT: 150 LBS | RESPIRATION RATE: 15 BRPM | DIASTOLIC BLOOD PRESSURE: 100 MMHG | HEART RATE: 96 BPM

## 2020-05-29 DIAGNOSIS — I10 ESSENTIAL HYPERTENSION: ICD-10-CM

## 2020-05-29 DIAGNOSIS — N20.0 BILATERAL RENAL STONES: Primary | ICD-10-CM

## 2020-05-29 DIAGNOSIS — L72.9 CYST OF SKIN: ICD-10-CM

## 2020-05-29 LAB
ALBUMIN SERPL BCP-MCNC: 3.7 G/DL (ref 3.5–5.2)
ALP SERPL-CCNC: 82 U/L (ref 55–135)
ALT SERPL W/O P-5'-P-CCNC: 20 U/L (ref 10–44)
ANION GAP SERPL CALC-SCNC: 12 MMOL/L (ref 8–16)
AST SERPL-CCNC: 17 U/L (ref 10–40)
BACTERIA #/AREA URNS AUTO: ABNORMAL /HPF
BASOPHILS # BLD AUTO: 0.02 K/UL (ref 0–0.2)
BASOPHILS NFR BLD: 0.3 % (ref 0–1.9)
BILIRUB SERPL-MCNC: 0.4 MG/DL (ref 0.1–1)
BILIRUB UR QL STRIP: NEGATIVE
BNP SERPL-MCNC: 27 PG/ML (ref 0–99)
BUN SERPL-MCNC: 30 MG/DL (ref 8–23)
CALCIUM SERPL-MCNC: 9.1 MG/DL (ref 8.7–10.5)
CHLORIDE SERPL-SCNC: 107 MMOL/L (ref 95–110)
CLARITY UR REFRACT.AUTO: ABNORMAL
CO2 SERPL-SCNC: 21 MMOL/L (ref 23–29)
COLOR UR AUTO: YELLOW
CREAT SERPL-MCNC: 1.8 MG/DL (ref 0.5–1.4)
DIFFERENTIAL METHOD: ABNORMAL
EOSINOPHIL # BLD AUTO: 0.2 K/UL (ref 0–0.5)
EOSINOPHIL NFR BLD: 2.1 % (ref 0–8)
ERYTHROCYTE [DISTWIDTH] IN BLOOD BY AUTOMATED COUNT: 13.9 % (ref 11.5–14.5)
EST. GFR  (AFRICAN AMERICAN): 43.1 ML/MIN/1.73 M^2
EST. GFR  (NON AFRICAN AMERICAN): 37.3 ML/MIN/1.73 M^2
GLUCOSE SERPL-MCNC: 234 MG/DL (ref 70–110)
GLUCOSE UR QL STRIP: ABNORMAL
HCT VFR BLD AUTO: 39 % (ref 40–54)
HGB BLD-MCNC: 12.6 G/DL (ref 14–18)
HGB UR QL STRIP: ABNORMAL
HYALINE CASTS UR QL AUTO: 1 /LPF
IMM GRANULOCYTES # BLD AUTO: 0.02 K/UL (ref 0–0.04)
IMM GRANULOCYTES NFR BLD AUTO: 0.3 % (ref 0–0.5)
KETONES UR QL STRIP: NEGATIVE
LEUKOCYTE ESTERASE UR QL STRIP: ABNORMAL
LYMPHOCYTES # BLD AUTO: 0.9 K/UL (ref 1–4.8)
LYMPHOCYTES NFR BLD: 11.8 % (ref 18–48)
MCH RBC QN AUTO: 30.1 PG (ref 27–31)
MCHC RBC AUTO-ENTMCNC: 32.3 G/DL (ref 32–36)
MCV RBC AUTO: 93 FL (ref 82–98)
MICROSCOPIC COMMENT: ABNORMAL
MONOCYTES # BLD AUTO: 0.6 K/UL (ref 0.3–1)
MONOCYTES NFR BLD: 7.3 % (ref 4–15)
NEUTROPHILS # BLD AUTO: 5.9 K/UL (ref 1.8–7.7)
NEUTROPHILS NFR BLD: 78.2 % (ref 38–73)
NITRITE UR QL STRIP: NEGATIVE
NRBC BLD-RTO: 0 /100 WBC
PH UR STRIP: 5 [PH] (ref 5–8)
PLATELET # BLD AUTO: 175 K/UL (ref 150–350)
PMV BLD AUTO: 11 FL (ref 9.2–12.9)
POTASSIUM SERPL-SCNC: 3.6 MMOL/L (ref 3.5–5.1)
PROT SERPL-MCNC: 6.8 G/DL (ref 6–8.4)
PROT UR QL STRIP: ABNORMAL
RBC # BLD AUTO: 4.18 M/UL (ref 4.6–6.2)
RBC #/AREA URNS AUTO: >100 /HPF (ref 0–4)
SARS-COV-2 RDRP RESP QL NAA+PROBE: NEGATIVE
SODIUM SERPL-SCNC: 140 MMOL/L (ref 136–145)
SP GR UR STRIP: 1.02 (ref 1–1.03)
SQUAMOUS #/AREA URNS AUTO: 0 /HPF
TROPONIN I SERPL DL<=0.01 NG/ML-MCNC: 0.03 NG/ML (ref 0–0.03)
URN SPEC COLLECT METH UR: ABNORMAL
WBC # BLD AUTO: 7.56 K/UL (ref 3.9–12.7)
WBC #/AREA URNS AUTO: 16 /HPF (ref 0–5)
YEAST UR QL AUTO: ABNORMAL

## 2020-05-29 PROCEDURE — 85025 COMPLETE CBC W/AUTO DIFF WBC: CPT

## 2020-05-29 PROCEDURE — 99285 EMERGENCY DEPT VISIT HI MDM: CPT | Mod: GW,,, | Performed by: EMERGENCY MEDICINE

## 2020-05-29 PROCEDURE — 99285 EMERGENCY DEPT VISIT HI MDM: CPT | Mod: 25

## 2020-05-29 PROCEDURE — 51798 US URINE CAPACITY MEASURE: CPT

## 2020-05-29 PROCEDURE — 81001 URINALYSIS AUTO W/SCOPE: CPT

## 2020-05-29 PROCEDURE — 99285 PR EMERGENCY DEPT VISIT,LEVEL V: ICD-10-PCS | Mod: GW,,, | Performed by: EMERGENCY MEDICINE

## 2020-05-29 PROCEDURE — 63600175 PHARM REV CODE 636 W HCPCS: Performed by: EMERGENCY MEDICINE

## 2020-05-29 PROCEDURE — 25000003 PHARM REV CODE 250: Performed by: EMERGENCY MEDICINE

## 2020-05-29 PROCEDURE — 80053 COMPREHEN METABOLIC PANEL: CPT

## 2020-05-29 PROCEDURE — 87086 URINE CULTURE/COLONY COUNT: CPT

## 2020-05-29 PROCEDURE — 84484 ASSAY OF TROPONIN QUANT: CPT

## 2020-05-29 PROCEDURE — 83880 ASSAY OF NATRIURETIC PEPTIDE: CPT

## 2020-05-29 PROCEDURE — 96374 THER/PROPH/DIAG INJ IV PUSH: CPT

## 2020-05-29 PROCEDURE — U0002 COVID-19 LAB TEST NON-CDC: HCPCS

## 2020-05-29 PROCEDURE — 96376 TX/PRO/DX INJ SAME DRUG ADON: CPT

## 2020-05-29 RX ORDER — HYDRALAZINE HYDROCHLORIDE 20 MG/ML
10 INJECTION INTRAMUSCULAR; INTRAVENOUS
Status: COMPLETED | OUTPATIENT
Start: 2020-05-29 | End: 2020-05-29

## 2020-05-29 RX ORDER — SULFAMETHOXAZOLE AND TRIMETHOPRIM 800; 160 MG/1; MG/1
1 TABLET ORAL 2 TIMES DAILY
Qty: 14 TABLET | Refills: 0 | Status: SHIPPED | OUTPATIENT
Start: 2020-05-29 | End: 2020-05-29 | Stop reason: SDUPTHER

## 2020-05-29 RX ORDER — SULFAMETHOXAZOLE AND TRIMETHOPRIM 800; 160 MG/1; MG/1
1 TABLET ORAL 2 TIMES DAILY
Qty: 14 TABLET | Refills: 0 | Status: SHIPPED | OUTPATIENT
Start: 2020-05-29 | End: 2020-06-03

## 2020-05-29 RX ORDER — LISINOPRIL 10 MG/1
10 TABLET ORAL DAILY
Status: DISCONTINUED | OUTPATIENT
Start: 2020-05-29 | End: 2020-05-30 | Stop reason: HOSPADM

## 2020-05-29 RX ORDER — TAMSULOSIN HYDROCHLORIDE 0.4 MG/1
0.4 CAPSULE ORAL DAILY
Qty: 5 CAPSULE | Refills: 0 | Status: SHIPPED | OUTPATIENT
Start: 2020-05-29 | End: 2020-06-03 | Stop reason: SDUPTHER

## 2020-05-29 RX ADMIN — LISINOPRIL 10 MG: 10 TABLET ORAL at 04:05

## 2020-05-29 RX ADMIN — HYDRALAZINE HYDROCHLORIDE 10 MG: 20 INJECTION INTRAMUSCULAR; INTRAVENOUS at 06:05

## 2020-05-29 RX ADMIN — HYDRALAZINE HYDROCHLORIDE 10 MG: 20 INJECTION INTRAMUSCULAR; INTRAVENOUS at 08:05

## 2020-05-29 RX ADMIN — SODIUM CHLORIDE 1000 ML: 0.9 INJECTION, SOLUTION INTRAVENOUS at 09:05

## 2020-05-29 NOTE — ED NOTES
Neptali Gonzalez, a 69 y.o. male presents to the ED via EMS with CC per EMS the pt. Care giver called EMS due to pt. Experiencing pain during urination and refusing to eat, drink, or take medicines.      Patient identifiers verified verbally with patient and correct for Neptali Gonzalez.    LOC/ APPEARANCE: The patient is AAOx3, pt. Believes he is 38 years old and he is unaware of the year. Pt is speaking appropriately, no slurred speech. Pt changed into hospital gown. Continuous cardiac monitor, cont pulse ox, and auto BP cuff applied to patient. Pt is not clean and well groomed, pt. Is malodorous.  made aware of assessment. No JVD visible. Pt reports pain level of 0. Pt updated on POC. Bed low and locked with side rails up x2, call bell in pt reach.  SKIN: Skin is warm dry and intact, and color is consistent with ethnicity. Capillary refill <3 seconds. No breakdown or brusing visible. Mucus membranes moist, acyanotic. Pt. Has cysts on his back, MD aware. No infection noted.  RESPIRATORY: Airway is open and patent. Respirations-spontaneous, unlabored, regular rate, equal bilaterally on inspiration and expiration. No accessory muscle use noted. Lungs clear to auscultation in all fields bilaterally anterior and posterior.   CARDIAC: Patient has regular heart rate.  No peripheral edema noted, and patient has no c/o chest pain. Peripheral pulses present equal and strong throughout.  ABDOMEN: Soft and non-tender to palpation with no distention noted. Normoactive bowel sounds x4 quadrants. Pt has no complaints of abnormal bowel movements, denies blood in stool. Pt reports normal appetite.   NEUROLOGIC: Eyes open spontaneously and facial expression symmetrical. Pt behavior appropriate to situation, and pt follows commands. Pt reports sensation present in all extremities when touched with a finger, denies any numbness or tingling. PERRLA  MUSCULOSKELETAL: Spontaneous movement noted to all  extremities. Hand  equal and leg strength strong +5 bilaterally.   : No complaints of frequency, burning, urgency or blood in the urine. No complaints of incontinence. Pt. Denies being in any pain, when bladder scanned, pt. Had 93 mls.
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
## 2020-05-29 NOTE — ED PROVIDER NOTES
Encounter Date: 5/29/2020       History     Chief Complaint   Patient presents with    Hematuria    Urinary Retention     69-year-old male with past medical history of dementia, CVA, MI status post stenting, kidney stones, CKD, presenting from home with 3 days of altered mental status, decreased oral intake, and intermittent hematuria.  Caregiver reports that patient appears to be in pain, crying in grimacing with poor sleep, but cannot localize pain. No exacerbating or alleviating factors.  She is concerned that he has a recurrence of his kidney stone since she has noticed some blood in his urine and decreased urine output.  She also notes that he has had poor appetite and has not taken his pills in the last 2 days.  Last bowel movement 2 days ago, denies fever chills, cough, vomiting.        Review of patient's allergies indicates:   Allergen Reactions    Percocet [oxycodone-acetaminophen] Other (See Comments)     AMS on narcotics     Past Medical History:   Diagnosis Date    CKD (chronic kidney disease)     Coronary artery disease 2002    stent    Diabetes mellitus     Hypertension     Kidney stones     MI (myocardial infarction)     Stroke      Past Surgical History:   Procedure Laterality Date    ARTHROSCOPIC REPAIR OF ROTATOR CUFF OF SHOULDER Right 11/7/2018    Procedure: ARTHROSCOPY WITH DISTAL CLAVICLE  EXCISION AND SUBACROMIAL  DECOMPRESSION;  Surgeon: Tuan Thurston Jr., MD;  [facility];  Service: Orthopedics;  Laterality: Right;    CARDIAC SURGERY      CORONARY STENT PLACEMENT      [facility] stent 2007      LITHOTRIPSY      TONSILLECTOMY       Family History   Problem Relation Age of Onset    Diabetes Mother     Dementia Mother     Other Father     Cystic fibrosis Son     Amblyopia Neg Hx     Blindness Neg Hx     Cataracts Neg Hx     Glaucoma Neg Hx     Macular degeneration Neg Hx     Retinal detachment Neg Hx     Strabismus Neg Hx      Social History     Tobacco Use     Smoking status: Former Smoker     Packs/day: 1.00     Years: 3.00     Pack years: 3.00     Start date: 1/1/1967    Smokeless tobacco: Never Used   Substance Use Topics    Alcohol use: No    Drug use: No     Review of Systems   Unable to perform ROS: Dementia   Constitutional: Positive for activity change and appetite change. Negative for fever.   Respiratory: Negative for cough.    Gastrointestinal: Negative for abdominal pain and vomiting.   Genitourinary: Positive for decreased urine volume and hematuria.       Physical Exam     Initial Vitals [05/29/20 1550]   BP Pulse Resp Temp SpO2   (!) 219/93 104 15 98 °F (36.7 °C) 99 %      MAP       --         Physical Exam    Nursing note and vitals reviewed.  Constitutional: He appears well-developed and well-nourished. He is not diaphoretic. No distress.   HENT:   Head: Normocephalic and atraumatic.   Nose: Nose normal.   Eyes: Conjunctivae and EOM are normal. Pupils are equal, round, and reactive to light.   Neck: Normal range of motion. Neck supple.   Cardiovascular: Normal rate and regular rhythm.   Pulmonary/Chest: Breath sounds normal. No respiratory distress. He has no wheezes. He has no rhonchi. He has no rales. He exhibits no tenderness.   Abdominal: Soft. Bowel sounds are normal. He exhibits no distension. There is no tenderness.   Musculoskeletal: Normal range of motion. He exhibits no edema.   Neurological: He is alert. He has normal strength.   Oriented to name only, follows commands   Skin: Skin is warm and dry. Capillary refill takes less than 2 seconds. No rash noted.   4 cm non-TTP, non-erythematous induration on back with small central eschar, no bleeding or drainage   Psychiatric: He has a normal mood and affect. His behavior is normal. Judgment and thought content normal.   Cooperative and pleasant         ED Course   Procedures  Labs Reviewed   CBC W/ AUTO DIFFERENTIAL - Abnormal; Notable for the following components:       Result Value    RBC 4.18  (*)     Hemoglobin 12.6 (*)     Hematocrit 39.0 (*)     Lymph # 0.9 (*)     Gran% 78.2 (*)     Lymph% 11.8 (*)     All other components within normal limits   COMPREHENSIVE METABOLIC PANEL - Abnormal; Notable for the following components:    CO2 21 (*)     Glucose 234 (*)     BUN, Bld 30 (*)     Creatinine 1.8 (*)     eGFR if  43.1 (*)     eGFR if non  37.3 (*)     All other components within normal limits   URINALYSIS, REFLEX TO URINE CULTURE - Abnormal; Notable for the following components:    Appearance, UA Hazy (*)     Protein, UA 1+ (*)     Glucose, UA 3+ (*)     Occult Blood UA 3+ (*)     Leukocytes, UA Trace (*)     All other components within normal limits    Narrative:     Preferred Collection Type->Urine, Clean Catch   URINALYSIS MICROSCOPIC - Abnormal; Notable for the following components:    RBC, UA >100 (*)     WBC, UA 16 (*)     All other components within normal limits    Narrative:     Preferred Collection Type->Urine, Clean Catch   TROPONIN I - Abnormal; Notable for the following components:    Troponin I 0.034 (*)     All other components within normal limits    Narrative:     ADD ON TROP ORDER #[id number]; BNP ORDER #[id number] PER YOAV RODRIGUEZ MD @ 05/29/2020  18:46    CULTURE, URINE    Narrative:     Preferred Collection Type->Urine, Clean Catch   TROPONIN I   B-TYPE NATRIURETIC PEPTIDE   SARS-COV-2 RNA AMPLIFICATION, QUAL   B-TYPE NATRIURETIC PEPTIDE    Narrative:     ADD ON TROP ORDER #[id number]; BNP ORDER #[id number] PER YOAV RODRIGUEZ MD @ 05/29/2020  18:46           Imaging Results           CT Renal Stone Study ABD Pelvis WO (Final result)  Result time 05/29/20 21:19:10    Final result by Valdo Tao MD (05/29/20 21:19:10)                 Impression:      Punctate stone in the left distal ureter with mild obstructive uropathy.  Minimal right hydroureteronephrosis without definitive ureteral stone.    Several chronic right-sided nephroliths the  largest measuring up to 2.5 cm in the right renal pelvis.  No evidence of hydronephrosis or obstructive uropathy identified.    Exophytic lesion at the inferior pole right kidney which measures higher in attenuation than expected for simple cyst, although density measurements may be spuriously elevated by poor image quality due to the patient's extremities overlying the field of view.  While this structure was present dating back to at least October 2014, it has enlarged since that time.  Differential includes simple cyst, hemorrhagic/proteinaceous cyst, or neoplasm.  Recommend further evaluation with non-emergent retroperitoneal ultrasound to exclude solid component.    Colonic diverticulosis.  Moderate volume stool burden which may reflect constipation.    Cholelithiasis.    Additional findings detailed above.    This report was flagged in Epic as abnormal.    Electronically signed by resident: Ryan Childress MD  Date:    05/29/2020  Time:    20:38    Electronically signed by: Valdo Tao MD  Date:    05/29/2020  Time:    21:19             Narrative:    EXAMINATION:  CT RENAL STONE STUDY ABD PELVIS WO    CLINICAL HISTORY:  Flank pain, stone disease suspected;    TECHNIQUE:  The patient was surveyed from the lung bases through the pelvis without the administration of oral or intravenous contrast and data was reconstructed for coronal, sagittal, and axial images.    COMPARISON:  CT abdomen pelvis 05/25/2016, CT renal stone study 10/03/2014.    FINDINGS:  Evaluation is limited by extensive streak artifact due to the patient's arms overlying the field of view.    Evaluation of the solid abdominal organs and bowel is limited in the absence of IV contrast.    The heart is normal in size with no pericardial effusion.  There is multi-vessel coronary artery atherosclerosis.  The lung bases are symmetrically expanded.  Calcified granuloma is present in the lateral basal segment right lower lobe.  No focal  consolidation, pleural effusion, pleural thickening, or pneumothorax.    Liver is prominent size measuring 18 cm in craniocaudal dimension, though normal in attenuation with no focal hepatic abnormality there are calcified gallstones in the gallbladder.  No pericholecystic fluid.  No intra or extrahepatic biliary duct dilatation.  There scattered granulomata throughout the spleen which is otherwise unremarkable.  The adrenal glands reveal nothing unusual.  There is fatty involution of the pancreas.    The kidneys normal in size and location.  There several right-sided renal stones, the largest measures 2.5 cm in the renal pelvis, present dating back to at least 10/03/2014.  There is exophytic lesion extending from the right renal midpole which measures slightly higher in attenuation than expected for simple cyst.  While this lesion has been present since at least 10/03/2014 is larger on today's study now measuring up to 2.4 cm (previously 0.9 cm).  It should be noted that density measurements of this lesion may be spuriously elevated by severe streak artifact from the patient's hand and metallic object overlying the field of view.  Stable ill-defined hypodensity at the interpolar region left kidney, too small to completely characterize, but likely a cyst.  Minimal right hydroureteronephrosis to the level of the urinary bladder.  A duplicated left renal collecting system which converges near the UPJ.  Mild left hydroureteronephrosis to the level of a probable punctate stone (2 mm) in the left distal ureter (axial image 72 and coronal image 33), noting that extensive artifact limits evaluation of this region.  The bladder is grossly unremarkable.  The prostate is enlarged, measuring greater than 6 cm in transverse width.    The abdominal aorta is normal in course and caliber with moderate calcific atherosclerosis.    Distal esophagus is normal in course noting a small hiatal hernia.  The stomach is otherwise  unremarkable.  Visualized loops of small and large bowel reveal no evidence of obstruction or inflammation.  There is moderate fecal loading throughout the large bowel and scattered colonic diverticulosis most notable in the sigmoid colon.  No abdominopelvic ascites, intraperitoneal free air, or bulky abdominopelvic lymphadenopathy.    Osseous structures demonstrate age-appropriate degenerative change.  No acute fracture.  The extraperitoneal soft tissue structures reveals inguinal hernias.                                 Medical Decision Making:   History:   I obtained history from: someone other than patient.       <> Summary of History: D/w Caregiver Sarina (see HPI)  Old Medical Records: I decided to obtain old medical records.  Old Records Summarized: records from clinic visits and records from previous admission(s).       <> Summary of Records: Current meds:  Aspirin 81 mg   Atorvastatin 40 mg   Lisinopril 10 mg   Metformin  mg   Vitamin D3 5,000 IU    Recently placed on home hospice  Initial Assessment:   Patient is alert and does not appear to be in distress, following commands although disoriented to place and time and unable to give history. CTAB, abd s/nt/nd, normal external genitalia, no focal deficits concerning for CVA  Differential Diagnosis:   Kidney stone, UTI, genital trauma, urethral stricture, renal failure, hypertensive urgency, hypertensive emergency, electrolyte abnormality, metabolic abnormality, no SOB or hypoxia or symptoms consistent with PNA, COVID, ACS, emotional/behavioral disturbance of end-stage dementia  Clinical Tests:   Lab Tests: Ordered and Reviewed  Radiological Study: Ordered and Reviewed  Medical Tests: Ordered and Reviewed  ED Management:  Spoke with patient's caregiver and healthcare proxy Sarina Burton who states she is very concerned as patient has demonstrated altered mental status over the past few days, refusing to take blood pressure or other medications,  "intermittently crying, and having blood in urine.  She clarifies that although patient is DNR/DNI, she would like him to be treated, and states that he was placed on hospice to assist her with caregiving.    10:09 PM  CT shows: "Several chronic right-sided nephroliths the largest measuring up to 2.5 cm in the right renal pelvis.  No evidence of hydronephrosis or obstructive uropathy identified. Punctate stone in the left distal ureter with mild obstructive uropathy.  Minimal right hydroureteronephrosis without definitive ureteral stone."   Findings consistent with hematuria, UA without overwhelming infection but will start on abx given reported changes in mental status (although this may also be related to pt's severe dementia).  No additional CT findings to explain pt's reported pain, and pt has remained comfortable during ED stay. Pt is hypertensive to 180s, however, does not have any FNDs concerning for CVA.    10:39 PM  D/w caregiver Sarina who states pt is chronically hypertensive to 180s/100s. I recommended f/u with PCP for BP control, will Rx flomax and abx for kidney stones, and will refer to Derm and urology. She is comfortable with plan and pt being discharged into her care this evening.   MDM Complexity Points:   Problem Points:  1.New problem, with additional ED work-up planned - Hematuria  2.New problem, with additional ED work-up planned - Hypertension    Data Points:  Review or order clinical lab tests, Review or order radiology test, Review or order medicine test (PFTs, EKG, cardiac echo or catheterization), Independent review of image, tracing, or specimen, Decision to obtain old records (in the EHR), Review and summarization of old records and Obtaining history from Someone else other than the patient.     Risk:  High Risk                     ED Course as of May 31 1839   Fri May 29, 2020   1810 Significant RBCs concerning for renal stone, however no evidence of infection   RBC, UA(!): >100 [JR] "   1811 Cr increased from 1.5 five mo ago   Creatinine(!): 1.8 [JR]   1811 BP increasing despite home lisinopril, will give hydralazine   BP(!): 231/93 [JR]      ED Course User Index  [JR] Lilly Treadwell MD                Clinical Impression:       ICD-10-CM ICD-9-CM   1. Bilateral renal stones N20.0 592.0   2. Essential hypertension I10 401.9   3. Cyst of skin L72.9 706.2             ED Disposition Condition    Discharge Stable        ED Prescriptions     Medication Sig Dispense Start Date End Date Auth. Provider    tamsulosin (FLOMAX) 0.4 mg Cap Take 1 capsule (0.4 mg total) by mouth once daily. for 5 days 5 capsule 5/29/2020 6/3/2020 Lilly Treadwell MD    sulfamethoxazole-trimethoprim 800-160mg (BACTRIM DS) 800-160 mg Tab  (Status: Discontinued) Take 1 tablet by mouth 2 (two) times daily. for 7 days 14 tablet 5/29/2020 5/29/2020 Lilly Treadwell MD    sulfamethoxazole-trimethoprim 800-160mg (BACTRIM DS) 800-160 mg Tab Take 1 tablet by mouth 2 (two) times daily. for 7 days 14 tablet 5/29/2020 6/5/2020 Lilly Treadwell MD        Follow-up Information     Follow up With Specialties Details Why Contact Info    Karen Chacon MD Internal Medicine Call in 1 day For blood pressure [address]  [place] [address]  [phone]      [facility] Emergency Medicine Go to  As needed [address] [facility] [address]  [phone]                                     Lilly Treadwell MD  05/31/20 1937

## 2020-05-29 NOTE — ED TRIAGE NOTES
Pt. Came into ED via EMS. Per EMS The pt. Is under hospice care and is living with caregivers. The pt. Has Hx. Of kidney stones and the caregivers believe he is in pain and experiencing urinary retention from another kidney stone. The pt. Denies pain.

## 2020-05-30 LAB
BACTERIA UR CULT: NORMAL
BACTERIA UR CULT: NORMAL

## 2020-05-30 NOTE — ED NOTES
Patient changed into clean clothes, clean sheets after urinary incontinence and bowel movement that was dry and grainy. Pt given diaper. Will continue to monitor.

## 2020-05-30 NOTE — DISCHARGE INSTRUCTIONS
Take Bactrim and Flomax for kidney stones. Follow up with Urology.    Follow up with Dermatology for cyst on back.    Follow up with PCP for high blood pressure.

## 2020-06-03 ENCOUNTER — OFFICE VISIT (OUTPATIENT)
Dept: PRIMARY CARE CLINIC | Facility: CLINIC | Age: 70
End: 2020-06-03
Payer: MEDICARE

## 2020-06-03 DIAGNOSIS — L72.9 CYST OF SKIN: ICD-10-CM

## 2020-06-03 DIAGNOSIS — N20.0 KIDNEY STONE: ICD-10-CM

## 2020-06-03 PROBLEM — T40.601A NARCOTIC OVERDOSE: Status: RESOLVED | Noted: 2019-05-02 | Resolved: 2020-06-03

## 2020-06-03 PROCEDURE — 99443 PR PHYSICIAN TELEPHONE EVALUATION 21-30 MIN: CPT | Mod: 95,GW,, | Performed by: INTERNAL MEDICINE

## 2020-06-03 PROCEDURE — 99443 PR PHYSICIAN TELEPHONE EVALUATION 21-30 MIN: ICD-10-PCS | Mod: 95,GW,, | Performed by: INTERNAL MEDICINE

## 2020-06-03 RX ORDER — TAMSULOSIN HYDROCHLORIDE 0.4 MG/1
0.4 CAPSULE ORAL DAILY
Qty: 90 CAPSULE | Refills: 3 | Status: SHIPPED | OUTPATIENT
Start: 2020-06-03 | End: 2021-01-06

## 2020-06-03 NOTE — PROGRESS NOTES
Telephone Check-In Call During COVID Crisis with [facility] Provider       The patient location is:  Patient Home   The chief complaint leading to consultation is: routine care and kidney stones  Total time spent with patient: 30m    Visit type: Telephone call with synchronous audio only     This visit was completed via telephone due to the restrictions of the COVID-19 pandemic. All issues as below were discussed and addressed but no physical exam was performed. If it was felt that the patient should be evaluated in clinic then they were directed there. The patient verbally consented to visit.      Subjective:      Patient ID: Neptali Gonzalez is a 69 y.o. male.    Patient's risk score for poor outcomes with COVID is 5. Patient is high risk for poor outcomes with COVID due to the following chronic conditions advanced age, dementia.    Patient presented to ED on 5/29. Patient is in pain today from his kidney stones. He had 3 injections of an antibiotic in the home (ceftriaxone) with hospice because he would not take the oral antibiotic prescribed in the ED. He was also started on tamsulosin in the ED, has finished the 5 day course. Continues to be in pain, has seen Urology.    Also has a cyst on his back, likely infected.    He has a fentanyl patch with hospice. Is not drinking enough water.    Patient is unable to participate in a virtual visit due to numerous barriers to care including dementia.    He no longer eats solid food, reportedly, because of the pain from the kidney stones.       Objective:     Lab Results   Component Value Date    WBC 7.56 05/29/2020    HGB 12.6 (L) 05/29/2020    HCT 39.0 (L) 05/29/2020     05/29/2020    CHOL 227 (H) 07/10/2019    TRIG 204 (H) 07/10/2019    HDL 48 07/10/2019    ALT 20 05/29/2020    AST 17 05/29/2020     05/29/2020    K 3.6 05/29/2020     05/29/2020    CREATININE 1.8 (H) 05/29/2020    BUN 30 (H) 05/29/2020    CO2 21 (L) 05/29/2020    TSH 0.803  07/10/2019    INR 1.1 05/25/2016    HGBA1C 7.4 (H) 12/09/2019         Assessment:   69 y.o. male with multiple co-morbid illnesses here to continue work-up of chronic issues.     Plan:     Problem List Items Addressed This Visit        Derm    Cyst of skin     Cyst on his back, likely infected  · Refer to Derm  · Complete bactrim from ED            Renal/    Kidney stone     Treated in ED on 5/29/20, continues to have symptoms  · Refer to Urology  · Completed 3 days of antibiotic injection         Relevant Medications    tamsulosin (FLOMAX) 0.4 mg Cap    Other Relevant Orders    Ambulatory referral/consult to Urology       Endocrine    Uncontrolled secondary diabetes mellitus with stage 3 CKD (GFR 30-59)     · GFR at goal, A1c elevated but has weight stablesince last visit   · Continue metformin to 500mg XR   · Monitor kidney function q3 mos  · Continue ACE  · Pill packing to improve compliance               Health Maintenance       Date Due Completion Date    TETANUS VACCINE 10/12/1967 ---    Shingles Vaccine (1 of 2) 10/12/1999 ---    Colonoscopy 10/12/1999 ---    Eye Exam 07/23/2019 7/23/2018    Override on 5/23/2017: Done (Eye Cam done)    Hemoglobin A1c 03/09/2020 12/9/2019    Lipid Panel 07/10/2020 7/10/2019    Foot Exam 01/09/2021 1/9/2020    Override on 9/14/2018: Done    High Dose Statin 03/10/2021 3/10/2020          Follow up in about 1 week (around 6/10/2020). Thirty minutes spent with this patient today, including kidney stones, cyst.    Karen Chacon MD/MPH  Internal Medicine  [facility]  Spectra [phone]

## 2020-06-03 NOTE — ASSESSMENT & PLAN NOTE
Treated in ED on 5/29/20, continues to have symptoms  · Refer to Urology  · Completed 3 days of antibiotic injection

## 2020-06-08 ENCOUNTER — PATIENT OUTREACH (OUTPATIENT)
Dept: ADMINISTRATIVE | Facility: OTHER | Age: 70
End: 2020-06-08

## 2020-06-08 NOTE — PROGRESS NOTES
LINKS immunization registry triggered  Care Everywhere updated  Health Maintenance updated  Chart reviewed for overdue Proactive [facility] health maintenance testing   DISPLAY PLAN FREE TEXT

## 2020-06-09 ENCOUNTER — TELEPHONE (OUTPATIENT)
Dept: UROLOGY | Facility: CLINIC | Age: 70
End: 2020-06-09

## 2020-06-09 NOTE — TELEPHONE ENCOUNTER
Tried to contact pt about appt scheduled as video visit with  for kidney stones.  is not able to evaluate for kidney stones by video. Pt would need to come in for appt. No answer received on phone. Message left on v/m. Pt has not checked his portal in 90 days. Has not confirmed appt.

## 2020-06-29 ENCOUNTER — PATIENT OUTREACH (OUTPATIENT)
Dept: ADMINISTRATIVE | Facility: OTHER | Age: 70
End: 2020-06-29

## 2020-06-29 NOTE — PROGRESS NOTES
Requested updates within Care Everywhere.  Patient's chart was reviewed for overdue LANA topics.  Immunizations reconciled.    Orders placed:n/a  Labs Linked:n/a

## 2020-07-29 ENCOUNTER — TELEPHONE (OUTPATIENT)
Dept: PRIMARY CARE CLINIC | Facility: CLINIC | Age: 70
End: 2020-07-29

## 2020-07-29 NOTE — TELEPHONE ENCOUNTER
----- Message from Ly Alexandre sent at 7/29/2020  9:34 AM CDT -----  Contact: Yolanda with [facility]   Yolanda was calling to follow up on a letter that was faxed over to the office. Yolanda states the letter was in regards to medication the pt received from this office. Please call and advise

## 2020-07-29 NOTE — TELEPHONE ENCOUNTER
Called and spoke with Kristan dunlap at [facility] in regards to letter that was faxed over to our office. Calling back in regards to message left from Yolanda for patient's medication.  Gave Kristan patient's information and she stated that she will relay message to Yolanda. Advised her to please contact the office here at [phone].

## 2020-10-02 ENCOUNTER — TELEPHONE (OUTPATIENT)
Dept: PRIMARY CARE CLINIC | Facility: CLINIC | Age: 70
End: 2020-10-02

## 2020-10-02 NOTE — TELEPHONE ENCOUNTER
----- Message from Jacinta Singh sent at 10/2/2020 10:21 AM CDT -----  Regarding: Care giver Graeme Mendez [phone]  Graeme called in regards to speaking with the nurse or MA concerning the patient about an appointment and his ID if they have a copy please advise

## 2020-10-05 ENCOUNTER — TELEPHONE (OUTPATIENT)
Dept: ADMINISTRATIVE | Facility: OTHER | Age: 70
End: 2020-10-05

## 2020-11-25 ENCOUNTER — TELEPHONE (OUTPATIENT)
Dept: PRIMARY CARE CLINIC | Facility: CLINIC | Age: 70
End: 2020-11-25

## 2020-12-14 NOTE — LETTER
September 9, 2019    Neptali Gonzalez  [address] [place] [address]             [facility] [address] [place] [address] Dear Mr. Gonzalez,    I work with [facility] Outpatient Case Management Department. I have been unsuccessful at reaching you to follow-up to see how you have been doing. Please call me back at your earliest convenience to discuss your health care needs.      I can be reached at [phone] from 8:00AM to 4:30 PM on Monday thru Friday. [facility] also has a program where a nurse is available 24/7 to answer questions or provide medical advice, their number is [phone].    Thanks,        Gi Snowden RN  Outpatient Case Management/Disease Management  [phone]        What is your communicable disease history:  · Denies     Do you know how to avoid getting:    · STD's: Yes:    · Hepatitis A, B, C: Yes    · COVID: Yes:    · TB: Yes:    · HIV: Yes:    · Flu: Yes:    Nursing supplied education to patient on the following topics:   · STD  · COVID  · Flu     Education included:   · Patient declined written information on Communicable Diseases, aware this information is available upon request and can be sent via email transmission.    Patient accepted education: No

## 2020-12-31 DIAGNOSIS — E11.59 HYPERTENSION ASSOCIATED WITH DIABETES: ICD-10-CM

## 2020-12-31 DIAGNOSIS — I15.2 HYPERTENSION ASSOCIATED WITH DIABETES: ICD-10-CM

## 2020-12-31 DIAGNOSIS — E11.69 DYSLIPIDEMIA WITH LOW HIGH DENSITY LIPOPROTEIN (HDL) CHOLESTEROL WITH HYPERTRIGLYCERIDEMIA DUE TO TYPE 2 DIABETES MELLITUS: ICD-10-CM

## 2020-12-31 DIAGNOSIS — E55.9 HYPOVITAMINOSIS D: ICD-10-CM

## 2020-12-31 DIAGNOSIS — E78.2 DYSLIPIDEMIA WITH LOW HIGH DENSITY LIPOPROTEIN (HDL) CHOLESTEROL WITH HYPERTRIGLYCERIDEMIA DUE TO TYPE 2 DIABETES MELLITUS: ICD-10-CM

## 2020-12-31 DIAGNOSIS — E11.8 TYPE 2 DIABETES MELLITUS WITH COMPLICATION, WITHOUT LONG-TERM CURRENT USE OF INSULIN: ICD-10-CM

## 2021-01-04 RX ORDER — ACETAMINOPHEN 500 MG
5000 TABLET ORAL DAILY
Qty: 90 TABLET | Refills: 3 | Status: SHIPPED | OUTPATIENT
Start: 2021-01-04

## 2021-01-04 RX ORDER — LISINOPRIL 10 MG/1
10 TABLET ORAL DAILY
Qty: 90 TABLET | Refills: 3 | Status: SHIPPED | OUTPATIENT
Start: 2021-01-04 | End: 2022-01-04

## 2021-01-04 RX ORDER — METFORMIN HYDROCHLORIDE 500 MG/1
500 TABLET, EXTENDED RELEASE ORAL DAILY
Qty: 90 TABLET | Refills: 3 | Status: SHIPPED | OUTPATIENT
Start: 2021-01-04 | End: 2022-01-04

## 2021-01-04 RX ORDER — ATORVASTATIN CALCIUM 40 MG/1
40 TABLET, FILM COATED ORAL DAILY
Qty: 90 TABLET | Refills: 3 | Status: SHIPPED | OUTPATIENT
Start: 2021-01-04 | End: 2022-01-04

## 2021-03-26 ENCOUNTER — PATIENT MESSAGE (OUTPATIENT)
Dept: PHARMACY | Facility: CLINIC | Age: 71
End: 2021-03-26

## 2021-03-26 ENCOUNTER — TELEPHONE (OUTPATIENT)
Dept: PHARMACY | Facility: CLINIC | Age: 71
End: 2021-03-26

## 2021-07-21 ENCOUNTER — TELEPHONE (OUTPATIENT)
Dept: PRIMARY CARE CLINIC | Facility: CLINIC | Age: 71
End: 2021-07-21

## 2021-07-23 ENCOUNTER — TELEPHONE (OUTPATIENT)
Dept: PRIMARY CARE CLINIC | Facility: CLINIC | Age: 71
End: 2021-07-23

## 2021-08-03 ENCOUNTER — PATIENT MESSAGE (OUTPATIENT)
Dept: ADMINISTRATIVE | Facility: HOSPITAL | Age: 71
End: 2021-08-03

## 2021-10-18 ENCOUNTER — PATIENT MESSAGE (OUTPATIENT)
Dept: ADMINISTRATIVE | Facility: HOSPITAL | Age: 71
End: 2021-10-18
Payer: MEDICARE

## 2021-11-09 ENCOUNTER — TELEPHONE (OUTPATIENT)
Dept: PRIMARY CARE CLINIC | Facility: CLINIC | Age: 71
End: 2021-11-09
Payer: MEDICARE

## 2021-11-12 ENCOUNTER — OFFICE VISIT (OUTPATIENT)
Dept: PRIMARY CARE CLINIC | Facility: CLINIC | Age: 71
End: 2021-11-12
Payer: MEDICARE

## 2021-11-12 DIAGNOSIS — F02.818 DEMENTIA ASSOCIATED WITH OTHER UNDERLYING DISEASE WITH BEHAVIORAL DISTURBANCE: ICD-10-CM

## 2021-11-12 PROCEDURE — 99443 PR PHYSICIAN TELEPHONE EVALUATION 21-30 MIN: CPT | Mod: 95,GW,, | Performed by: INTERNAL MEDICINE

## 2021-11-12 PROCEDURE — 4010F ACE/ARB THERAPY RXD/TAKEN: CPT | Mod: CPTII,95,, | Performed by: INTERNAL MEDICINE

## 2021-11-12 PROCEDURE — 99499 RISK ADDL DX/OHS AUDIT: ICD-10-PCS | Mod: HCNC,95,, | Performed by: INTERNAL MEDICINE

## 2021-11-12 PROCEDURE — 99443 PR PHYSICIAN TELEPHONE EVALUATION 21-30 MIN: ICD-10-PCS | Mod: 95,GW,, | Performed by: INTERNAL MEDICINE

## 2021-11-12 PROCEDURE — 99499 UNLISTED E&M SERVICE: CPT | Mod: HCNC,95,, | Performed by: INTERNAL MEDICINE

## 2021-11-12 PROCEDURE — 4010F PR ACE/ARB THEARPY RXD/TAKEN: ICD-10-PCS | Mod: CPTII,95,, | Performed by: INTERNAL MEDICINE

## 2022-01-21 ENCOUNTER — TELEPHONE (OUTPATIENT)
Dept: PRIMARY CARE CLINIC | Facility: CLINIC | Age: 72
End: 2022-01-21
Payer: MEDICARE

## 2022-01-21 NOTE — TELEPHONE ENCOUNTER
Spoke with [facility]. Patient  20. He  in the home on hospice. Please loi him as .       Linda Prince, MS4   [facility]    Karen Chacon MD/MPH  [facility]  [phone]

## 2023-04-10 NOTE — DISCHARGE INSTRUCTIONS
PRE-ADMIT TESTING -  [phone]    [address]  [place] [facility]          Your surgery has been scheduled at [facility]. We are pleased to have the opportunity to serve you. For Further Information please call [phone].    On the day of surgery please report to the Information Desk on the 1st floor.    · CONTACT YOUR PHYSICIAN'S OFFICE THE DAY PRIOR TO YOUR SURGERY TO OBTAIN YOUR ARRIVAL TIME.     · The evening before surgery do not eat anything after 9 p.m. ( this includes hard candy, chewing gum and mints).  You may only have GATORADE, POWERADE AND WATER  from 9 p.m. until you leave your home.   DO NOT DRINK ANY LIQUIDS ON THE WAY TO THE HOSPITAL.      SPECIAL MEDICATION INSTRUCTIONS: TAKE medications checked off by the Anesthesiologist on your Medication List.    Angiogram Patients: Take medications as instructed by your physician, including aspirin.     Surgery Patients:    If you take ASPIRIN - Your PHYSICIAN/SURGEON will need to inform you IF/OR when you need to stop taking aspirin prior to your surgery.     Do Not take any medications containing IBUPROFEN.  Do Not Wear any make-up or dark nail polish   (especially eye make-up) to surgery. If you come to surgery with makeup on you will be required to remove the makeup or nail polish.    Do not shave your surgical area at least 5 days prior to your surgery. The surgical prep will be performed at the hospital according to Infection Control regulations.    Leave all valuables at home.   Do Not wear any jewelry or watches, including any metal in body piercings.  Contact Lens must be removed before surgery. Either do not wear the contact lens or bring a case and solution for storage.  Please bring a container for eyeglasses or dentures as required.  Bring any paperwork your physician has provided, such as consent forms,  history and physicals, doctor's orders, etc.   Bring comfortable clothes that are loose fitting to wear upon  discharge. Take into consideration the type of surgery being performed.  Maintain your diet as advised per your physician the day prior to surgery.      Adequate rest the night before surgery is advised.   Park in the Parking lot behind the hospital or in the [facility] across the street from the parking lot. Parking is complimentary.  If you will be discharged the same day as your procedure, please arrange for a responsible adult to drive you home or to accompany you if traveling by taxi.   YOU WILL NOT BE PERMITTED TO DRIVE OR TO LEAVE THE HOSPITAL ALONE AFTER SURGERY.   It is strongly recommended that you arrange for someone to remain with you for the first 24 hrs following your surgery.       Thank you for your cooperation.  The Staff of [facility].        Bathing Instructions                                                                 Please shower the evening before and morning of your procedure with    ANTIBACTERIAL SOAP. ( DIAL, etc )  Concentrate on the surgical area   for at least 3 minutes and rinse completely. Dry off as usual.   Do not use any deodorant, powder, body lotions, perfume, after shave or    cologne.                                             Detail Level: Detailed Products Recommended: Recommend zinc or titanium dioxide sunscreen General Sunscreen Counseling: I recommended a broad spectrum sunscreen with a SPF of 30 or higher.  I explained that SPF 30 sunscreens block approximately 97 percent of the sun's harmful rays.  Sunscreens should be applied at least 15 minutes prior to expected sun exposure and then every 2 hours after that as long as sun exposure continues. If swimming or exercising sunscreen should be reapplied every 45 minutes to an hour after getting wet or sweating.  One ounce, or the equivalent of a shot glass full of sunscreen, is adequate to protect the skin not covered by a bathing suit. I also recommended a lip balm with a sunscreen as well. Sun protective clothing can be used in lieu of sunscreen but must be worn the entire time you are exposed to the sun's rays.

## (undated) DEVICE — GLOVE BIOGEL SKINSENSE PI 8.5

## (undated) DEVICE — APPLICATOR CHLORAPREP ORN 26ML

## (undated) DEVICE — DRESSING XEROFORM 1X8IN

## (undated) DEVICE — SEE MEDLINE ITEM 157166

## (undated) DEVICE — NDL 18GA X1 1/2 REG BEVEL

## (undated) DEVICE — GLOVE SURG ULTRA TOUCH 7.5

## (undated) DEVICE — PROBE MEGAVAC 90 AMBIENT

## (undated) DEVICE — TUBING MINIBORE EXTENSION

## (undated) DEVICE — SYR GLASS 5CC LUER LOK

## (undated) DEVICE — SLING ARM LARGE

## (undated) DEVICE — SOL NACL IRR 3000ML

## (undated) DEVICE — SYR 10CC LUER LOCK

## (undated) DEVICE — APPLICATOR CHLORAPREP CLR 10.5

## (undated) DEVICE — SYRINGE SAFETY LOK 5CC 305558

## (undated) DEVICE — SYS LABEL CORRECT MED

## (undated) DEVICE — SUT ETHILON 3-0 PS2 18 BLK

## (undated) DEVICE — BLADE SURG STAINLESS STEEL #15

## (undated) DEVICE — PAD ABD 8X10 STERILE

## (undated) DEVICE — GLOVE BIOGEL SKINSENSE PI 6.5

## (undated) DEVICE — NDL SAFETY 25G X 1.5 ECLIPSE

## (undated) DEVICE — DRESSING XEROFORM FOIL PK 1X8

## (undated) DEVICE — SYR 30CC LUER LOCK

## (undated) DEVICE — KIT TRACTION SHLDR ST DISP LF

## (undated) DEVICE — NDL TUOHY EPIDURAL 20G X 3.5

## (undated) DEVICE — GAUZE SPONGE 4X4 12PLY

## (undated) DEVICE — PAD ABDOMINAL 5X9 STERILE

## (undated) DEVICE — BLADE SHAVER 4.5 6/BX

## (undated) DEVICE — NDL HYPODERMIC BLUNT 18G 1.5IN

## (undated) DEVICE — SUPPORT SLING SHOT II MEDIUM

## (undated) DEVICE — Device

## (undated) DEVICE — GLOVE SURG BIOGEL LATEX SZ 7.5